# Patient Record
Sex: MALE | Race: ASIAN | NOT HISPANIC OR LATINO | URBAN - METROPOLITAN AREA
[De-identification: names, ages, dates, MRNs, and addresses within clinical notes are randomized per-mention and may not be internally consistent; named-entity substitution may affect disease eponyms.]

---

## 2022-03-07 ENCOUNTER — INPATIENT (INPATIENT)
Facility: HOSPITAL | Age: 62
LOS: 6 days | Discharge: HOME | DRG: 871 | End: 2022-03-14
Attending: INTERNAL MEDICINE | Admitting: INTERNAL MEDICINE
Payer: COMMERCIAL

## 2022-03-07 VITALS
HEIGHT: 65 IN | DIASTOLIC BLOOD PRESSURE: 88 MMHG | HEART RATE: 119 BPM | TEMPERATURE: 100 F | RESPIRATION RATE: 22 BRPM | SYSTOLIC BLOOD PRESSURE: 146 MMHG | OXYGEN SATURATION: 98 % | WEIGHT: 145.95 LBS

## 2022-03-07 DIAGNOSIS — C16.9 MALIGNANT NEOPLASM OF STOMACH, UNSPECIFIED: ICD-10-CM

## 2022-03-07 DIAGNOSIS — I10 ESSENTIAL (PRIMARY) HYPERTENSION: ICD-10-CM

## 2022-03-07 DIAGNOSIS — U07.1 COVID-19: ICD-10-CM

## 2022-03-07 DIAGNOSIS — R06.00 DYSPNEA, UNSPECIFIED: ICD-10-CM

## 2022-03-07 DIAGNOSIS — Z95.828 PRESENCE OF OTHER VASCULAR IMPLANTS AND GRAFTS: Chronic | ICD-10-CM

## 2022-03-07 DIAGNOSIS — K61.2 ANORECTAL ABSCESS: Chronic | ICD-10-CM

## 2022-03-07 DIAGNOSIS — D64.9 ANEMIA, UNSPECIFIED: ICD-10-CM

## 2022-03-07 DIAGNOSIS — Z29.9 ENCOUNTER FOR PROPHYLACTIC MEASURES, UNSPECIFIED: ICD-10-CM

## 2022-03-07 LAB
ALBUMIN SERPL ELPH-MCNC: 3.6 G/DL — SIGNIFICANT CHANGE UP (ref 3.3–5)
ALP SERPL-CCNC: 86 U/L — SIGNIFICANT CHANGE UP (ref 40–120)
ALT FLD-CCNC: 20 U/L — SIGNIFICANT CHANGE UP (ref 10–45)
ANION GAP SERPL CALC-SCNC: 12 MMOL/L — SIGNIFICANT CHANGE UP (ref 5–17)
ANISOCYTOSIS BLD QL: SLIGHT — SIGNIFICANT CHANGE UP
APPEARANCE UR: CLEAR — SIGNIFICANT CHANGE UP
AST SERPL-CCNC: 29 U/L — SIGNIFICANT CHANGE UP (ref 10–40)
BACTERIA # UR AUTO: NEGATIVE — SIGNIFICANT CHANGE UP
BASE EXCESS BLDV CALC-SCNC: 2.4 MMOL/L — HIGH (ref -2–2)
BASOPHILS # BLD AUTO: 0 K/UL — SIGNIFICANT CHANGE UP (ref 0–0.2)
BASOPHILS NFR BLD AUTO: 0 % — SIGNIFICANT CHANGE UP (ref 0–2)
BILIRUB SERPL-MCNC: 0.6 MG/DL — SIGNIFICANT CHANGE UP (ref 0.2–1.2)
BILIRUB UR-MCNC: NEGATIVE — SIGNIFICANT CHANGE UP
BLD GP AB SCN SERPL QL: NEGATIVE — SIGNIFICANT CHANGE UP
BLOOD GAS VENOUS - CREATININE: SIGNIFICANT CHANGE UP MG/DL (ref 0.5–1.3)
BUN SERPL-MCNC: 10 MG/DL — SIGNIFICANT CHANGE UP (ref 7–23)
CA-I SERPL-SCNC: 1.14 MMOL/L — LOW (ref 1.15–1.33)
CALCIUM SERPL-MCNC: 8.6 MG/DL — SIGNIFICANT CHANGE UP (ref 8.4–10.5)
CHLORIDE BLDV-SCNC: 100 MMOL/L — SIGNIFICANT CHANGE UP (ref 96–108)
CHLORIDE SERPL-SCNC: 97 MMOL/L — SIGNIFICANT CHANGE UP (ref 96–108)
CO2 BLDV-SCNC: 29 MMOL/L — HIGH (ref 22–26)
CO2 SERPL-SCNC: 26 MMOL/L — SIGNIFICANT CHANGE UP (ref 22–31)
COLOR SPEC: COLORLESS — SIGNIFICANT CHANGE UP
CREAT SERPL-MCNC: 0.8 MG/DL — SIGNIFICANT CHANGE UP (ref 0.5–1.3)
DACRYOCYTES BLD QL SMEAR: SLIGHT — SIGNIFICANT CHANGE UP
DIFF PNL FLD: NEGATIVE — SIGNIFICANT CHANGE UP
EGFR: 101 ML/MIN/1.73M2 — SIGNIFICANT CHANGE UP
EOSINOPHIL # BLD AUTO: 0 K/UL — SIGNIFICANT CHANGE UP (ref 0–0.5)
EOSINOPHIL NFR BLD AUTO: 0 % — SIGNIFICANT CHANGE UP (ref 0–6)
EPI CELLS # UR: 0 /HPF — SIGNIFICANT CHANGE UP
GAS PNL BLDV: 132 MMOL/L — LOW (ref 136–145)
GAS PNL BLDV: SIGNIFICANT CHANGE UP
GAS PNL BLDV: SIGNIFICANT CHANGE UP
GLUCOSE BLDV-MCNC: 97 MG/DL — SIGNIFICANT CHANGE UP (ref 70–99)
GLUCOSE SERPL-MCNC: 103 MG/DL — HIGH (ref 70–99)
GLUCOSE UR QL: NEGATIVE — SIGNIFICANT CHANGE UP
HCO3 BLDV-SCNC: 28 MMOL/L — SIGNIFICANT CHANGE UP (ref 22–29)
HCT VFR BLD CALC: 27.1 % — LOW (ref 39–50)
HCT VFR BLDA CALC: 27 % — LOW (ref 39–51)
HGB BLD CALC-MCNC: 9.1 G/DL — LOW (ref 12.6–17.4)
HGB BLD-MCNC: 8.5 G/DL — LOW (ref 13–17)
HYPOCHROMIA BLD QL: SLIGHT — SIGNIFICANT CHANGE UP
KETONES UR-MCNC: NEGATIVE — SIGNIFICANT CHANGE UP
LACTATE BLDV-MCNC: 1.5 MMOL/L — SIGNIFICANT CHANGE UP (ref 0.7–2)
LEUKOCYTE ESTERASE UR-ACNC: NEGATIVE — SIGNIFICANT CHANGE UP
LYMPHOCYTES # BLD AUTO: 0.11 K/UL — LOW (ref 1–3.3)
LYMPHOCYTES # BLD AUTO: 1.8 % — LOW (ref 13–44)
MANUAL SMEAR VERIFICATION: SIGNIFICANT CHANGE UP
MCHC RBC-ENTMCNC: 25.3 PG — LOW (ref 27–34)
MCHC RBC-ENTMCNC: 31.4 GM/DL — LOW (ref 32–36)
MCV RBC AUTO: 80.7 FL — SIGNIFICANT CHANGE UP (ref 80–100)
MICROCYTES BLD QL: SLIGHT — SIGNIFICANT CHANGE UP
MONOCYTES # BLD AUTO: 0.22 K/UL — SIGNIFICANT CHANGE UP (ref 0–0.9)
MONOCYTES NFR BLD AUTO: 3.5 % — SIGNIFICANT CHANGE UP (ref 2–14)
NEUTROPHILS # BLD AUTO: 5.98 K/UL — SIGNIFICANT CHANGE UP (ref 1.8–7.4)
NEUTROPHILS NFR BLD AUTO: 94.7 % — HIGH (ref 43–77)
NITRITE UR-MCNC: NEGATIVE — SIGNIFICANT CHANGE UP
NT-PROBNP SERPL-SCNC: 48 PG/ML — SIGNIFICANT CHANGE UP (ref 0–300)
OB PNL STL: NEGATIVE — SIGNIFICANT CHANGE UP
OVALOCYTES BLD QL SMEAR: SLIGHT — SIGNIFICANT CHANGE UP
PCO2 BLDV: 46 MMHG — SIGNIFICANT CHANGE UP (ref 42–55)
PH BLDV: 7.39 — SIGNIFICANT CHANGE UP (ref 7.32–7.43)
PH UR: 6.5 — SIGNIFICANT CHANGE UP (ref 5–8)
PLAT MORPH BLD: NORMAL — SIGNIFICANT CHANGE UP
PLATELET # BLD AUTO: 330 K/UL — SIGNIFICANT CHANGE UP (ref 150–400)
PO2 BLDV: 28 MMHG — SIGNIFICANT CHANGE UP (ref 25–45)
POIKILOCYTOSIS BLD QL AUTO: SLIGHT — SIGNIFICANT CHANGE UP
POLYCHROMASIA BLD QL SMEAR: SIGNIFICANT CHANGE UP
POTASSIUM BLDV-SCNC: 4.5 MMOL/L — SIGNIFICANT CHANGE UP (ref 3.5–5.1)
POTASSIUM SERPL-MCNC: 4.1 MMOL/L — SIGNIFICANT CHANGE UP (ref 3.5–5.3)
POTASSIUM SERPL-SCNC: 4.1 MMOL/L — SIGNIFICANT CHANGE UP (ref 3.5–5.3)
PROT SERPL-MCNC: 6.6 G/DL — SIGNIFICANT CHANGE UP (ref 6–8.3)
PROT UR-MCNC: NEGATIVE — SIGNIFICANT CHANGE UP
RBC # BLD: 3.36 M/UL — LOW (ref 4.2–5.8)
RBC # FLD: 30.4 % — HIGH (ref 10.3–14.5)
RBC BLD AUTO: ABNORMAL
RBC CASTS # UR COMP ASSIST: 1 /HPF — SIGNIFICANT CHANGE UP (ref 0–4)
RH IG SCN BLD-IMP: POSITIVE — SIGNIFICANT CHANGE UP
SAO2 % BLDV: 27.4 % — LOW (ref 67–88)
SARS-COV-2 RNA SPEC QL NAA+PROBE: DETECTED
SCHISTOCYTES BLD QL AUTO: SLIGHT — SIGNIFICANT CHANGE UP
SODIUM SERPL-SCNC: 135 MMOL/L — SIGNIFICANT CHANGE UP (ref 135–145)
SP GR SPEC: 1.03 — HIGH (ref 1.01–1.02)
TROPONIN T, HIGH SENSITIVITY RESULT: 8 NG/L — SIGNIFICANT CHANGE UP (ref 0–51)
UROBILINOGEN FLD QL: NEGATIVE — SIGNIFICANT CHANGE UP
WBC # BLD: 6.31 K/UL — SIGNIFICANT CHANGE UP (ref 3.8–10.5)
WBC # FLD AUTO: 6.31 K/UL — SIGNIFICANT CHANGE UP (ref 3.8–10.5)
WBC UR QL: 0 /HPF — SIGNIFICANT CHANGE UP (ref 0–5)

## 2022-03-07 PROCEDURE — 99223 1ST HOSP IP/OBS HIGH 75: CPT

## 2022-03-07 PROCEDURE — 71045 X-RAY EXAM CHEST 1 VIEW: CPT | Mod: 26

## 2022-03-07 PROCEDURE — 99285 EMERGENCY DEPT VISIT HI MDM: CPT

## 2022-03-07 PROCEDURE — 71275 CT ANGIOGRAPHY CHEST: CPT | Mod: 26,MA

## 2022-03-07 RX ORDER — PANTOPRAZOLE SODIUM 20 MG/1
1 TABLET, DELAYED RELEASE ORAL
Qty: 0 | Refills: 0 | DISCHARGE

## 2022-03-07 RX ORDER — CHLORPROMAZINE HCL 10 MG
1 TABLET ORAL
Qty: 0 | Refills: 0 | DISCHARGE

## 2022-03-07 RX ORDER — ONDANSETRON 8 MG/1
4 TABLET, FILM COATED ORAL EVERY 8 HOURS
Refills: 0 | Status: DISCONTINUED | OUTPATIENT
Start: 2022-03-07 | End: 2022-03-14

## 2022-03-07 RX ORDER — CEFTRIAXONE 500 MG/1
1000 INJECTION, POWDER, FOR SOLUTION INTRAMUSCULAR; INTRAVENOUS ONCE
Refills: 0 | Status: COMPLETED | OUTPATIENT
Start: 2022-03-07 | End: 2022-03-07

## 2022-03-07 RX ORDER — SODIUM CHLORIDE 9 MG/ML
500 INJECTION INTRAMUSCULAR; INTRAVENOUS; SUBCUTANEOUS ONCE
Refills: 0 | Status: COMPLETED | OUTPATIENT
Start: 2022-03-07 | End: 2022-03-07

## 2022-03-07 RX ORDER — ENOXAPARIN SODIUM 100 MG/ML
40 INJECTION SUBCUTANEOUS EVERY 12 HOURS
Refills: 0 | Status: DISCONTINUED | OUTPATIENT
Start: 2022-03-07 | End: 2022-03-14

## 2022-03-07 RX ORDER — OXYCODONE HYDROCHLORIDE 5 MG/1
5 TABLET ORAL EVERY 4 HOURS
Refills: 0 | Status: DISCONTINUED | OUTPATIENT
Start: 2022-03-07 | End: 2022-03-12

## 2022-03-07 RX ORDER — ALBUTEROL 90 UG/1
2 AEROSOL, METERED ORAL EVERY 4 HOURS
Refills: 0 | Status: DISCONTINUED | OUTPATIENT
Start: 2022-03-07 | End: 2022-03-14

## 2022-03-07 RX ORDER — CLONAZEPAM 1 MG
1 TABLET ORAL
Qty: 0 | Refills: 0 | DISCHARGE

## 2022-03-07 RX ORDER — POLYETHYLENE GLYCOL 3350 17 G/17G
17 POWDER, FOR SOLUTION ORAL DAILY
Refills: 0 | Status: DISCONTINUED | OUTPATIENT
Start: 2022-03-07 | End: 2022-03-14

## 2022-03-07 RX ORDER — ACETAMINOPHEN 500 MG
975 TABLET ORAL ONCE
Refills: 0 | Status: COMPLETED | OUTPATIENT
Start: 2022-03-07 | End: 2022-03-07

## 2022-03-07 RX ORDER — AZITHROMYCIN 500 MG/1
500 TABLET, FILM COATED ORAL ONCE
Refills: 0 | Status: COMPLETED | OUTPATIENT
Start: 2022-03-07 | End: 2022-03-07

## 2022-03-07 RX ORDER — ACETAMINOPHEN 500 MG
650 TABLET ORAL EVERY 4 HOURS
Refills: 0 | Status: DISCONTINUED | OUTPATIENT
Start: 2022-03-07 | End: 2022-03-14

## 2022-03-07 RX ORDER — TELMISARTAN 20 MG/1
1 TABLET ORAL
Qty: 0 | Refills: 0 | DISCHARGE

## 2022-03-07 RX ORDER — PANTOPRAZOLE SODIUM 20 MG/1
80 TABLET, DELAYED RELEASE ORAL ONCE
Refills: 0 | Status: COMPLETED | OUTPATIENT
Start: 2022-03-07 | End: 2022-03-07

## 2022-03-07 RX ORDER — PANTOPRAZOLE SODIUM 20 MG/1
40 TABLET, DELAYED RELEASE ORAL
Refills: 0 | Status: DISCONTINUED | OUTPATIENT
Start: 2022-03-07 | End: 2022-03-14

## 2022-03-07 RX ORDER — LOSARTAN POTASSIUM 100 MG/1
100 TABLET, FILM COATED ORAL DAILY
Refills: 0 | Status: DISCONTINUED | OUTPATIENT
Start: 2022-03-07 | End: 2022-03-14

## 2022-03-07 RX ORDER — CLONAZEPAM 1 MG
0.5 TABLET ORAL AT BEDTIME
Refills: 0 | Status: DISCONTINUED | OUTPATIENT
Start: 2022-03-07 | End: 2022-03-14

## 2022-03-07 RX ORDER — SENNA PLUS 8.6 MG/1
2 TABLET ORAL AT BEDTIME
Refills: 0 | Status: DISCONTINUED | OUTPATIENT
Start: 2022-03-07 | End: 2022-03-14

## 2022-03-07 RX ADMIN — Medication 200 MILLIGRAM(S): at 17:57

## 2022-03-07 RX ADMIN — Medication 200 MILLIGRAM(S): at 23:44

## 2022-03-07 RX ADMIN — CEFTRIAXONE 100 MILLIGRAM(S): 500 INJECTION, POWDER, FOR SOLUTION INTRAMUSCULAR; INTRAVENOUS at 15:40

## 2022-03-07 RX ADMIN — PANTOPRAZOLE SODIUM 80 MILLIGRAM(S): 20 TABLET, DELAYED RELEASE ORAL at 15:40

## 2022-03-07 RX ADMIN — ALBUTEROL 2 PUFF(S): 90 AEROSOL, METERED ORAL at 23:45

## 2022-03-07 RX ADMIN — Medication 975 MILLIGRAM(S): at 15:50

## 2022-03-07 RX ADMIN — Medication 975 MILLIGRAM(S): at 16:00

## 2022-03-07 RX ADMIN — AZITHROMYCIN 255 MILLIGRAM(S): 500 TABLET, FILM COATED ORAL at 17:28

## 2022-03-07 RX ADMIN — SODIUM CHLORIDE 500 MILLILITER(S): 9 INJECTION INTRAMUSCULAR; INTRAVENOUS; SUBCUTANEOUS at 15:39

## 2022-03-07 NOTE — ED PROVIDER NOTE - OBJECTIVE STATEMENT
62y M w/ PMHx with stage IV gastric adenocarcinoma (pMMR, HER-2 0+, CPS <1) with mets to the peritoneum presenting to the ED with SOB, productive cough ,intermittent fever and melanotic school x1 week. Patient states he noted dark stool Monday that has since resolved. States his shortness of breath has progressed and now exists at rest. Cough is productive with white sputum and he states he has had intermittent fevers as high as 102F. Last chemo was Friday (FOLFOX/Nivolumab), states he is current chemo course is getting changed and he is planned to also start radiation therapy. Patient had recent CT chest/abdomen/pelvis on 3/2/2022 which showed significant progression of diease and lymphangitic spread. Denies known sick contacts, states he has never had hx of DVT/PE. Also complaining of chest pain with coughing and speaking. States he has chronic abdominal pain, no acute worsening of his symptoms. Denies dizziness, nausea, vomiting, dysuria, hematuria, rash.     [place] [facility] [place]

## 2022-03-07 NOTE — CONSULT NOTE ADULT - ASSESSMENT
Patient is a 62 year old male [place] [facility] [place] with stage IV gastric adenocarcinoma (pMMR, HER-2 0+, CPS <1) with mets to the peritoneum. First cycle of chemotherapy was FOLFOX/Nivolumab - patient initially had response but was admitted to [facility] for SOB, cough and found to have progression of disease. CT chest/abdomen/pelvis 3/2/2022 showed significant progression of diease and lymphangitic spread. Patient is also reporting dark bowel movements, strongly concerning for melena.    Patient received one dose of paclitaxel 80 mg/m2 on 3/4/2022. RT consult was placed but he has not yet seen [place]. Forrest [place] [facility] [place] now at [facility] with ongoing SOB, cough and dark stools.    Stage IV Gastric Cancer  --[place] [facility] [place]  --S/P second line of chemotherapy - Paclitaxel 80 mg/m2 - started on 3/4/2022  --Patient was also being evaluated for palliative RT  --Please get RT consult as requested by Dr. Rojas    Anemia  --Most likely multifactorial  --Anemia of chronic disease/chemotherapy  --Concern for GIB, melena  --Please draw iron, TIBC, Ferritin  --If patient found to be iron deficient, would consider giving IV iron replacement  --Please also get GI consult    We will continue to follow patient and coordinate [place] [facility]    Alejandro Ureña PA-C  Hematology/Oncology  [facility]   [phone] (office)  [phone] (alt office)  Evenings and weekends please call MD on call or office       Patient is a 62 year old male under care at [facility] Dr. Brad Rojas with stage IV gastric adenocarcinoma (pMMR, HER-2 0+, CPS <1) with mets to the peritoneum. First cycle of chemotherapy was FOLFOX/Nivolumab - patient initially had response but was admitted to [facility] for SOB, cough and found to have progression of disease. CT chest/abdomen/pelvis 3/2/2022 showed significant progression of diease and lymphangitic spread. Patient is also reporting dark bowel movements, strongly concerning for melena.    Patient received one dose of paclitaxel 80 mg/m2 on 3/4/2022. RT consult was placed but he has not yet seen Dr. Forrest of [facility]. He is now at [facility] with ongoing SOB, cough and dark stools.    Stage IV Gastric Cancer  --Under Care by Dr. Brad Rojas at [facility]  --S/P second line of chemotherapy - Paclitaxel 80 mg/m2 - started on 3/4/2022  --Patient was also being evaluated for palliative RT  --Please get RT consult as requested by Dr. Rojas    Anemia  --Most likely multifactorial  --Anemia of chronic disease/chemotherapy  --Concern for GIB, melena  --Please draw iron, TIBC, Ferritin  --If patient found to be iron deficient, would consider giving IV iron replacement  --Please also get GI consult    If admitted to medicine, please admit to Dr. Ari Villanueva who is aware of patient.    We will continue to follow patient and coordinate with [facility].    Alejandro Ureña PA-C  Hematology/Oncology  [facility]   [phone] (office)  [phone] (alt office)  Evenings and weekends please call MD on call or office       Patient is a 62 year old male under care at [facility] [place]. Brad Rojas with stage IV gastric adenocarcinoma (pMMR, HER-2 0+, CPS <1) with mets to the peritoneum. First cycle of chemotherapy was FOLFOX/Nivolumab - patient initially had response but was admitted to [facility] for SOB, cough and found to have progression of disease. CT chest/abdomen/pelvis 3/2/2022 showed significant progression of diease and lymphangitic spread. Patient is also reporting dark bowel movements, strongly concerning for melena.    Patient received one dose of paclitaxel 80 mg/m2 on 3/4/2022. RT consult was placed but he has not yet seen Dr. Forrest of [facility]. He is now at [facility] with ongoing SOB, cough and dark stools.    Stage IV Gastric Cancer  --Under Care by Dr. Brad Rojas at [facility]  --S/P second line of chemotherapy - Paclitaxel 80 mg/m2 - started on 3/4/2022  --Patient was also being evaluated for palliative RT  --Please get RT consult as requested by Dr. Rojas    Anemia  --Most likely multifactorial  --Anemia of chronic disease/chemotherapy  --Concern for GIB, melena  --Please draw iron, TIBC, Ferritin, stool for occult blood  --If patient found to be iron deficient, would consider giving IV iron replacement  --Please also get GI consult    If admitted to medicine, please admit to Dr. Ari Villanueva who is aware of patient.    We will continue to follow patient and coordinate with [facility].    Alejandro Ureña PA-C  Hematology/Oncology  [facility]   [phone] (office)  [phone] (alt office)  Evenings and weekends please call MD on call or office       Patient is a 62 year old male under care at [facility] [place] Rojas with stage IV gastric adenocarcinoma (pMMR, HER-2 0+, CPS <1) with mets to the peritoneum. First cycle of chemotherapy was FOLFOX/Nivolumab - patient initially had response but was admitted to [facility] for SOB, cough and found to have progression of disease. CT chest/abdomen/pelvis 3/2/2022 showed significant progression of diease and lymphangitic spread. Patient is also reporting dark bowel movements, strongly concerning for melena.    Patient received one dose of paclitaxel 80 mg/m2 on 3/4/2022. RT consult was placed but he has not yet seen Dr. Forrest of [facility]. He is now at [facility] with ongoing SOB, cough and dark stools.    Stage IV Gastric Cancer  --Under Care by Dr. Brad Rojas at [facility]  --S/P second line of chemotherapy - Paclitaxel 80 mg/m2 - started on 3/4/2022  --Patient was also being evaluated for palliative RT  --Please get RT consult for palliative RT as requested by Dr. Rojas    Anemia  --Most likely multifactorial  --Anemia of chronic disease/chemotherapy  --Concern for GIB, melena  --Please draw iron, TIBC, Ferritin, stool for occult blood  --If patient found to be iron deficient, would consider giving IV iron replacement  --Please also get GI consult    Shortness of Breath  --Lymphangitic spread on CT  --Please get Pulmonary consult    If admitted to medicine, please admit to Dr. Ari Villanueva who is aware of patient.    We will continue to follow patient and coordinate with [facility].    Alejandro Ureña PA-C  Hematology/Oncology  [facility]   [phone] (office)  [phone] (alt office)  Evenings and weekends please call MD on call or office       Patient is a 62 year old [place] [facility] [place] with stage IV gastric adenocarcinoma (pMMR, HER-2 0+, CPS <1) with mets to the peritoneum. First cycle of chemotherapy was FOLFOX/Nivolumab - patient initially had response but was admitted to [facility] for SOB, cough and found to have progression of disease. CT chest/abdomen/pelvis 3/2/2022 showed significant progression of diease and lymphangitic spread. Patient is also reporting dark bowel movements, strongly concerning for melena.    Patient received one dose of paclitaxel 80 mg/m2 on 3/4/2022. RT consult was placed but he has not yet seen [place] [facility] is now at [facility] with ongoing SOB, cough and dark stools.    Stage IV Gastric Cancer  --[place] [facility]  --S/P second line of chemotherapy - Paclitaxel 80 mg/m2 - started on 3/4/2022  --Patient was also being evaluated for palliative RT  --Please get RT consult for palliative RT as requested by Dr. Rojas (pain and bleeding)    Anemia  --Most likely multifactorial  --Anemia of chronic disease/chemotherapy  --Concern for GIB, melena  --Please draw iron, TIBC, Ferritin, stool for occult blood  --If patient found to be iron deficient, would consider giving IV iron replacement  --Please also get GI consult    Shortness of Breath, Fever  --Lymphangitic spread on CT  --Need to rule out infectious process as well given fever to 102  --CXR done -results pending  --Please get Pulmonary consult    If admitted to medicine, please admit to Dr. Ari Villanueva who is aware of patient.    We will continue to follow patient and coordinate with [facility]    Alejandro Ureña PA-C  Hematology/Oncology  [facility]   [phone] (office)  [phone] (alt office)  Evenings and weekends please call MD on call or office

## 2022-03-07 NOTE — H&P ADULT - PROBLEM SELECTOR PLAN 5
dvt ppx: Lovenox 40mg q12h pending d-dimer  gi ppx: home pantoprazole  ambulate: with assistance  Diet: regular

## 2022-03-07 NOTE — H&P ADULT - NSHPPHYSICALEXAM_GEN_ALL_CORE
Vital Signs Last 24 Hrs  T(C): 36.9 (07 Mar 2022 19:29), Max: 38.4 (07 Mar 2022 16:34)  T(F): 98.4 (07 Mar 2022 19:29), Max: 101.2 (07 Mar 2022 16:34)  HR: 102 (07 Mar 2022 19:29) (102 - 119)  BP: 117/90 (07 Mar 2022 19:29) (117/90 - 146/88)  BP(mean): --  RR: 22 (07 Mar 2022 19:29) (22 - 24)  SpO2: 98% (07 Mar 2022 19:29) (98% - 99%)

## 2022-03-07 NOTE — H&P ADULT - PROBLEM SELECTOR PLAN 1
-currently not hypoxic on room air  -c/w tessalon 200mg q8h PRN  -continuous pulse ox monitoring  -Albuterol PRN  -obtain D-dimer (although likely to be elevated in setting of malignancy)  -tylenol PRN fever   -fever likely secondary to COVID but will obtain blood/urine cultures-no over consolidations seen on imaging   -monitor off antibiotics for now

## 2022-03-07 NOTE — H&P ADULT - HISTORY OF PRESENT ILLNESS
61yo M w/ PMHx of Stage IV gastric adenocarcinoma w/ mets to peritoneum, HTN, Hx of PE (s/p Lovenox & IVC filter) presents for cough/SOB, patient was initially Dx w/ gastric adenocarcinoma 11/2021 and received FOLFOX/Nivolumab, initially with response, however patient reports suffering cough/SOB with clear sputum that has progressively worsened over the past week, he went to [facility] and imaging there showed worsening disease progression and patient was prescribed Augment and discharged home, today he reports continued symptoms, he endorses a fever last night of 100.2 and had 1 episode of dark stool that resolved, he previously was on lovenox for a PE, had IVC filter placed and was previously taken off therapeutic lovenox due to anemia, he follows with Dr. Brad Rojas at [facility] [place], in the ED, he was hemodynamically stable, afebrile to 102, mildly tachypneic but saturating well on room air, labs were significant for borderline low microcytic anemia (unknown baseline) and COVID positive, CTA chest negative for PE, oncology was consulted in the ED, patient admitted to general medicine for further management.

## 2022-03-07 NOTE — ED PROVIDER NOTE - NS ED ROS FT
CONSTITUTIONAL: + fevers, + chills, no lightheadedness, no dizziness  EYES: no visual changes, no eye pain  EARS: no ear drainage, no ear pain, no change in hearing  NOSE: no nasal congestion  MOUTH/THROAT: no sore throat  CV: +chest pain, no palpitations  RESP: + SOB, + cough  GI: No n/v/d, +chronic abd pain, +dark stool   : no dysuria, no hematuria, no flank pain  MSK: no back pain, no extremity pain  SKIN: no rashes  NEURO: no headache, no focal weakness, no decreased sensation/paresthesias   PSYCHIATRIC: no known mental health issues

## 2022-03-07 NOTE — ED PROVIDER NOTE - CLINICAL SUMMARY MEDICAL DECISION MAKING FREE TEXT BOX
roscoe 62 m w metastatic gastric ca on chemo with worsening sob and cough also co dark melanotic stool improving over last few days-  pt had ct last week with progression of dz chest and abd -- on eval tachycardic - normotensive sat98 on ra - coarse bs bl - with fever 102 lat pm - productive cough clear sputum -- will cta w ho ca - r/o pe -- iv fluids, will need transfusion if < 7 - empirically tx for pna- will need admission -

## 2022-03-07 NOTE — ED ADULT NURSE NOTE - OBJECTIVE STATEMENT
62y M w/ PMHx with stage IV gastric adenocarcinoma  with mets to the peritoneum presenting to the ED with SOB, productive cough ,intermittent fever and melanotic stool x1 week. Patient states he noted dark stool Monday that has since resolved. States his shortness of breath has progressed and now exists at rest. Cough is productive with white sputum and he states he has had intermittent fevers as high as 102F. Last chemo was Friday, states he is current chemo course is getting changed and he is planned to also start radiation therapy. Patient had recent CT chest/abdomen/pelvis on 3/2/2022 which showed significant progression of diease and lymphangitic spread. Denies known sick contacts, states he has never had hx of DVT/PE. Also complaining of chest pain with coughing and speaking. States he has chronic abdominal pain, no acute worsening of his symptoms. Denies dizziness, nausea, vomiting, dysuria, hematuria, rash

## 2022-03-07 NOTE — H&P ADULT - PROBLEM SELECTOR PLAN 2
-follows with [facility] following at [facility]  -s/p second line chemotherapy Paclitaxel - started on 3/4/2022  -c/w home pain regimen oxycodone IR 5mg q4h PRN  -bowel regimen   -radiation oncology consult in AM  -clonazepam 0.5mg qhs   -iSTOP reviewed Reference #[id number]  -Zofran 4mg IV PRN for N/V (monitor QTc)  -adrenal nodule noted on imaging

## 2022-03-07 NOTE — CONSULT NOTE ADULT - SUBJECTIVE AND OBJECTIVE BOX
Reason for consult: Stage IV Gastric Cancer    HPI:  Patient is a 62 year old male under care at [facility] Dr. Duong Rojas with stage IV gastric adenocarcinoma (pMMR, HER-2 0+, CPS <1) with mets to the peritoneum. First cycle of chemotherapy was FOLFOX/Nivolumab - patient initially had response but was admitted to [facility] for SOB, cough and found to have progression of disease. CT chest/abdomen/pelvis 3/2/2022 showed significant progression of diease and lymphangitic spread. Patient is also reporting dark bowel movements, strongly concerning for melena.    Patient received one dose of paclitaxel 80 mg/m2 on 3/4/2022. RT consult was placed but he has not yet seen Dr. Forrest of [facility]. He is now at [facility] with ongoing SOB, cough and dark stools.      PAST MEDICAL & SURGICAL HISTORY:      FAMILY HISTORY:      Alcohol Denied  Smoking: Nonsmoker  Drug Use: Denied  Marital Status:         Allergies    No Known Allergies    Intolerances        MEDICATIONS  (STANDING):    MEDICATIONS  (PRN):      ROS  No fever, sweats, chills  No epistaxis, HA, sore throat  SOB, cough  Dark stool concerning for melena  No edema  No rash  No anxiety  No back pain, joint pain  No bleeding, bruising  No dysuria, hematuria    T(C): 37.7 (22 @ 14:01), Max: 37.7 (22 @ 14:01)  HR: 119 (22 @ 14:01) (119 - 119)  BP: 146/88 (22 @ 14:01) (146/88 - 146/88)  RR: 22 (22 @ 14:01) (22 - 22)  SpO2: 98% (22 @ 14:01) (98% - 98%)  Wt(kg): --    PE  NAD  Awake, alert  Anicteric, MMM  RRR  CTAB  Abd soft, NT, ND  No c/c/e  No rash grossly  FROM    [facility]   DEPARTMENT OF RADIOLOGY   [facility] [address] [address] [phone]     FOREIGN FILMS   Report of Consultation   Name: CECY LEE Acc No: [id number]   MRN: [id number] Date of Service: 2022   : 1960 Sex: M Pt Loc: [facility]   Ordered by: DUONG ROJAS MD Date of Report: 2022 05:18 PM   Procedure: FOREIGN CT CH/AB/PELVIS Account: [id number]   PRID #: [id number]     FINAL REPORT   3/2/2022 submitted CT chest, abdomen, pelvis performed 3/1/2022   CLINICAL STATEMENT: Patient with history of gastric cancer referred for   diagnostic workup of coughing and fever.   TECHNIQUE: CT with IV contrast.   RADIATION DOSE (DLP): 785 mGy-cm   COMPARISON: CT chest abdomen pelvis 2022.   CORRELATION: None.   FINDINGS:   LUNGS/AIRWAYS: Right more than left   probable lymphangitic malignancy spread extending out from from bilateral   hilar masses.   PLEURA/PERICARDIUM: New small bilateral pleural effusions. Increased   moderate pericardial effusion.   MEDIASTINUM/THORACIC NODES: Increased cervicothoracic and   bilateral hilar adenopathy, for example:   * Diffusely infiltrating bilateral hilar masses, right more than left,   with mediastinal extension and multifocal bronchial and vascular narrowing   * Right upper paratracheal, 2.7 x 1.9 cm, prior 1.5 x 1.1 cm.   * Prevascular, 2.0 x 1.6 cm, prior 1.6 x 0.9 cm   * Left supraclavicular, 1.8 x 1.4 cm, prior 0.8 x 0.6 cm.     HEPATOBILIARY: Unchanged punctate hypodense focus.   SPLEEN: Unremarkable.   PANCREAS: Unremarkable.   ADRENAL GLANDS: Left para-aortic adenopathy inseparable from left   adrenal, probably direct tumor extension. Right adrenal unremarkable.   KIDNEYS: Bilateral cortical cysts and too small   to characterize hypoattenuating lesions.   ABDOMINOPELVIC   NODES: Increased abdominopelvic adenopathy,   for example:   * Gastrohepatic ligament, 3.2 x 2.4 cm, prior 2.2 x 1.6 cm   * Left para-aortic, 4.6 x 3.4 cm, prior 3.3 x 2.3 cm.   * Left common iliac, 2.8 x 1.5 cm, prior 1.8 x 1.0 cm     PELVIC ORGANS: Enlarged prostate. Nonspecific mild presacral   fat stranding.   PERITONEUM/   MESENTERY/BOWEL: Probably increased distal gastric wall thickening; no   evidence of obstruction. Increased multiple perigastric nodules up to 2.1   x 1.9 cm, prior 1.7 x 1.2 cm. Increased diffuse mesenteric stranding, most   prominently in the omentum and left paracolic gutter.     BONES/SOFT TISSUES: No suspicious osseous lesion. Unchanged right chest   wall lipoma.   OTHER: Right chest wall port, catheter tip   in right atrium. Infrarenal IVC filter. Coronary artery calcifications.   IMPRESSION:   1. Since 2022, increased extent of malignancy involving   stomach, widespread lymph nodes, probably peritoneum, lungs, and left   adrenal.   2. Diffusely infiltrating metastases at bilateral pulmonary cherry, with   multifocal bronchovascular compression and lymphangitic spread into lungs.   3. Increased moderate pericardial effusion; new small bilateral pleural   effusions.     FINAL REPORT   Dictated By:   Staff Radiologist: AUSTIN HERBERT MD   I attest that the above IMPRESSION is based upon my personal examination of   the entire imaging study and that I have reviewed and approved this report.   The following terms are used in [facility] Radiology reports   (except those of breast imaging studies)   to convey the radiologist's level of certainty for a given interpretation.   Consistent with > 90%   Suspicious for/Probable/Probably approx 75%   Possible/Possibly approx 50%   Less likely approx 25%   Unlikely < 10%     Electronically Signed By: AUSTIN HERBERT MD (Mar 2 2022 5:18PM)          Reason for consult: Stage IV Gastric Cancer    HPI:  Patient is a 62 year old male under care at [facility] [place]. Duong Rojas with stage IV gastric adenocarcinoma (pMMR, HER-2 0+, CPS <1) with mets to the peritoneum. First cycle of chemotherapy was FOLFOX/Nivolumab - patient initially had response but was admitted to [facility] for SOB, cough and found to have progression of disease. CT chest/abdomen/pelvis 3/2/2022 showed significant progression of diease and lymphangitic spread. Patient is also reporting dark bowel movements, strongly concerning for melena.    Patient received one dose of paclitaxel 80 mg/m2 on 3/4/2022. RT consult was placed but he has not yet seen Dr. Forrest of [facility]. He is now at [facility] with ongoing SOB, cough and dark stools.      PAST MEDICAL & SURGICAL HISTORY:      FAMILY HISTORY:      Alcohol Denied  Smoking: Nonsmoker  Drug Use: Denied  Marital Status:         Allergies    No Known Allergies    Intolerances        MEDICATIONS  (STANDING):    MEDICATIONS  (PRN):      ROS  Fever to 102 last night  No epistaxis, HA, sore throat  SOB, cough  Dark stool concerning for melena  Upper abdominal/lower chest pains  No edema  No rash  No anxiety  No back pain, joint pain  No bleeding, bruising  No dysuria, hematuria    T(C): 37.7 (22 @ 14:01), Max: 37.7 (22 @ 14:01)  HR: 119 (22 @ 14:01) (119 - 119)  BP: 146/88 (22 @ 14:01) (146/88 - 146/88)  RR: 22 (22 @ 14:01) (22 - 22)  SpO2: 98% (22 @ 14:01) (98% - 98%)  Wt(kg): --    PE  NAD  Awake, alert  Anicteric, MMM  RRR  CTAB  Abd guarding - RUQ tenderness  No c/c/e  No rash grossly  FROM    [facility]   DEPARTMENT OF RADIOLOGY   [facility] [address]   [address] [place] [address] [phone]     FOREIGN FILMS   Report of Consultation   Name: CECY LEE ALONSO Acc No: [id number]   MRN: [id number] Date of Service: 2022   : 1960 Sex: M Pt Loc: [facility]   Ordered by: DUONG ROJAS MD Date of Report: 2022 05:18 PM   Procedure: FOREIGN CT CH/AB/PELVIS Account: [id number]   PRID #: [id number]     FINAL REPORT   3/2/2022 submitted CT chest, abdomen, pelvis performed 3/1/2022   CLINICAL STATEMENT: Patient with history of gastric cancer referred for   diagnostic workup of coughing and fever.   TECHNIQUE: CT with IV contrast.   RADIATION DOSE (DLP): 785 mGy-cm   COMPARISON: CT chest abdomen pelvis 2022.   CORRELATION: None.   FINDINGS:   LUNGS/AIRWAYS: Right more than left   probable lymphangitic malignancy spread extending out from from bilateral   hilar masses.   PLEURA/PERICARDIUM: New small bilateral pleural effusions. Increased   moderate pericardial effusion.   MEDIASTINUM/THORACIC NODES: Increased cervicothoracic and   bilateral hilar adenopathy, for example:   * Diffusely infiltrating bilateral hilar masses, right more than left,   with mediastinal extension and multifocal bronchial and vascular narrowing   * Right upper paratracheal, 2.7 x 1.9 cm, prior 1.5 x 1.1 cm.   * Prevascular, 2.0 x 1.6 cm, prior 1.6 x 0.9 cm   * Left supraclavicular, 1.8 x 1.4 cm, prior 0.8 x 0.6 cm.     HEPATOBILIARY: Unchanged punctate hypodense focus.   SPLEEN: Unremarkable.   PANCREAS: Unremarkable.   ADRENAL GLANDS: Left para-aortic adenopathy inseparable from left   adrenal, probably direct tumor extension. Right adrenal unremarkable.   KIDNEYS: Bilateral cortical cysts and too small   to characterize hypoattenuating lesions.   ABDOMINOPELVIC   NODES: Increased abdominopelvic adenopathy,   for example:   * Gastrohepatic ligament, 3.2 x 2.4 cm, prior 2.2 x 1.6 cm   * Left para-aortic, 4.6 x 3.4 cm, prior 3.3 x 2.3 cm.   * Left common iliac, 2.8 x 1.5 cm, prior 1.8 x 1.0 cm     PELVIC ORGANS: Enlarged prostate. Nonspecific mild presacral   fat stranding.   PERITONEUM/   MESENTERY/BOWEL: Probably increased distal gastric wall thickening; no   evidence of obstruction. Increased multiple perigastric nodules up to 2.1   x 1.9 cm, prior 1.7 x 1.2 cm. Increased diffuse mesenteric stranding, most   prominently in the omentum and left paracolic gutter.     BONES/SOFT TISSUES: No suspicious osseous lesion. Unchanged right chest   wall lipoma.   OTHER: Right chest wall port, catheter tip   in right atrium. Infrarenal IVC filter. Coronary artery calcifications.   IMPRESSION:   1. Since 2022, increased extent of malignancy involving   stomach, widespread lymph nodes, probably peritoneum, lungs, and left   adrenal.   2. Diffusely infiltrating metastases at bilateral pulmonary cherry, with   multifocal bronchovascular compression and lymphangitic spread into lungs.   3. Increased moderate pericardial effusion; new small bilateral pleural   effusions.     FINAL REPORT   Dictated By:   Staff Radiologist: AUSTIN HERBERT MD   I attest that the above IMPRESSION is based upon my personal examination of   the entire imaging study and that I have reviewed and approved this report.   The following terms are used in [facility] Rad[facility] reports   (except those of breast imaging studies)   to convey the radiologist's level of certainty for a given interpretation.   Consistent with > 90%   Suspicious for/Probable/Probably approx 75%   Possible/Possibly approx 50%   Less likely approx 25%   Unlikely < 10%     Electronically Signed By: AUSTIN HERBERT MD (Mar 2 2022 5:18PM)

## 2022-03-07 NOTE — ED CLERICAL - NS ED CLERK NOTE PRE-ARRIVAL INFORMATION; ADDITIONAL PRE-ARRIVAL INFORMATION
CC/Reason For referral: HX gastric Ca SOB cough, GIB  Preferred Consultant(if applicable): any  Who admits for you (if needed): Rich  Do you have documents you would like to fax over? no  Would you still like to speak to an ED attending?  yes at [phone]

## 2022-03-07 NOTE — H&P ADULT - NSHPLABSRESULTS_GEN_ALL_CORE
Personally reviewed labs, EKG and imaging     CXR: perihilar opacities, chest port visible, very small left sided pleural effusion

## 2022-03-07 NOTE — ED PROVIDER NOTE - PHYSICAL EXAMINATION
Physical Exam:  Gen: Awake, alert, dyspneic w/ intermittent coughing   HEENT: normal conjunctiva, tongue midline, oral mucosa moist  Neck: +tender R cervical lymph node   Lung: +mild course sounds to RLL, +tachypnea, speaking in short sentences broken up by coughing   CV: Rapid rate, RR, no murmurs, rubs or gallops  Abd: soft, NT, ND, no guarding, no rigidity, no rebound tenderness, no CVA tenderness   MSK: no visible deformities, ROM normal in UE/LE, no back pain  Neuro: No focal sensory or motor deficits  Skin: Warm, well perfused, no rash, +small ecchymosis to abdomen, no leg swelling  Psych: normal affect, calm  Iris Linn D.O. -Resident

## 2022-03-07 NOTE — H&P ADULT - OPHTHALMOLOGIC
Called patient's wife (see NEGRITO on file) regarding recent results in message below. Discussed diet in detail including decreasing  high carb/ high sugar intake and increasing physical activity. She verbalized understanding and agreeable.  A1c order in place for July with lab appointment on 7/ 22/21.    details…

## 2022-03-07 NOTE — CHART NOTE - NSCHARTNOTEFT_GEN_A_CORE
Patient with advanced gastric cancer under care by Dr. Brad Rojas at [facility] presenting to [facility] with SOB, cough. Was discharged from [facility] for same 3/3/2022 and received cycle 1 of weekly Paclitaxel on 3/4 at [facility]. Prior therapy was Fluorouracil/Leucovorin/Nivolumab/Oxaplatin (last dose 2/9/2022). Per Dr. Rojas, patient also having dark stools with concern for melena.     Records being sent from [facility]. Full consult to follow.    If admitted to medicine, please admit to Dr. Ari Villanueva who is aware of patient.    Thank you,    Alejandro Ureña PA-C  Hematology/Oncology  [facility]   [phone] (office)  [phone] (alt office)  Evenings and weekends please call MD on call or office

## 2022-03-07 NOTE — ED PROVIDER NOTE - PROGRESS NOTE DETAILS
Kaveh CORRALES (PGY-3): Patient with rectal temp 101.3F, BCx added, abx ordered, will give Tylenol, penidng labs, CT, will require admission.

## 2022-03-08 DIAGNOSIS — G89.3 NEOPLASM RELATED PAIN (ACUTE) (CHRONIC): ICD-10-CM

## 2022-03-08 DIAGNOSIS — R05.9 COUGH, UNSPECIFIED: ICD-10-CM

## 2022-03-08 DIAGNOSIS — C78.00 SECONDARY MALIGNANT NEOPLASM OF UNSPECIFIED LUNG: ICD-10-CM

## 2022-03-08 DIAGNOSIS — Z51.5 ENCOUNTER FOR PALLIATIVE CARE: ICD-10-CM

## 2022-03-08 LAB
ALBUMIN SERPL ELPH-MCNC: 3.2 G/DL — LOW (ref 3.3–5)
ALP SERPL-CCNC: 79 U/L — SIGNIFICANT CHANGE UP (ref 40–120)
ALT FLD-CCNC: 20 U/L — SIGNIFICANT CHANGE UP (ref 10–45)
ANION GAP SERPL CALC-SCNC: 12 MMOL/L — SIGNIFICANT CHANGE UP (ref 5–17)
AST SERPL-CCNC: 25 U/L — SIGNIFICANT CHANGE UP (ref 10–40)
BASOPHILS # BLD AUTO: 0.02 K/UL — SIGNIFICANT CHANGE UP (ref 0–0.2)
BASOPHILS NFR BLD AUTO: 0.3 % — SIGNIFICANT CHANGE UP (ref 0–2)
BILIRUB SERPL-MCNC: 0.6 MG/DL — SIGNIFICANT CHANGE UP (ref 0.2–1.2)
BUN SERPL-MCNC: 8 MG/DL — SIGNIFICANT CHANGE UP (ref 7–23)
CALCIUM SERPL-MCNC: 8.5 MG/DL — SIGNIFICANT CHANGE UP (ref 8.4–10.5)
CHLORIDE SERPL-SCNC: 100 MMOL/L — SIGNIFICANT CHANGE UP (ref 96–108)
CO2 SERPL-SCNC: 24 MMOL/L — SIGNIFICANT CHANGE UP (ref 22–31)
CREAT SERPL-MCNC: 0.78 MG/DL — SIGNIFICANT CHANGE UP (ref 0.5–1.3)
CULTURE RESULTS: SIGNIFICANT CHANGE UP
D DIMER BLD IA.RAPID-MCNC: 2865 NG/ML DDU — HIGH
EGFR: 102 ML/MIN/1.73M2 — SIGNIFICANT CHANGE UP
EOSINOPHIL # BLD AUTO: 0.06 K/UL — SIGNIFICANT CHANGE UP (ref 0–0.5)
EOSINOPHIL NFR BLD AUTO: 1 % — SIGNIFICANT CHANGE UP (ref 0–6)
GLUCOSE SERPL-MCNC: 98 MG/DL — SIGNIFICANT CHANGE UP (ref 70–99)
HCT VFR BLD CALC: 26 % — LOW (ref 39–50)
HCV AB S/CO SERPL IA: 0.2 S/CO — SIGNIFICANT CHANGE UP (ref 0–0.99)
HCV AB SERPL-IMP: SIGNIFICANT CHANGE UP
HGB BLD-MCNC: 8.2 G/DL — LOW (ref 13–17)
IMM GRANULOCYTES NFR BLD AUTO: 0.5 % — SIGNIFICANT CHANGE UP (ref 0–1.5)
LYMPHOCYTES # BLD AUTO: 0.35 K/UL — LOW (ref 1–3.3)
LYMPHOCYTES # BLD AUTO: 5.8 % — LOW (ref 13–44)
MAGNESIUM SERPL-MCNC: 2.2 MG/DL — SIGNIFICANT CHANGE UP (ref 1.6–2.6)
MCHC RBC-ENTMCNC: 25.4 PG — LOW (ref 27–34)
MCHC RBC-ENTMCNC: 31.5 GM/DL — LOW (ref 32–36)
MCV RBC AUTO: 80.5 FL — SIGNIFICANT CHANGE UP (ref 80–100)
MONOCYTES # BLD AUTO: 0.29 K/UL — SIGNIFICANT CHANGE UP (ref 0–0.9)
MONOCYTES NFR BLD AUTO: 4.8 % — SIGNIFICANT CHANGE UP (ref 2–14)
NEUTROPHILS # BLD AUTO: 5.28 K/UL — SIGNIFICANT CHANGE UP (ref 1.8–7.4)
NEUTROPHILS NFR BLD AUTO: 87.6 % — HIGH (ref 43–77)
NRBC # BLD: 0 /100 WBCS — SIGNIFICANT CHANGE UP (ref 0–0)
PHOSPHATE SERPL-MCNC: 3.9 MG/DL — SIGNIFICANT CHANGE UP (ref 2.5–4.5)
PLATELET # BLD AUTO: 315 K/UL — SIGNIFICANT CHANGE UP (ref 150–400)
POTASSIUM SERPL-MCNC: 4.5 MMOL/L — SIGNIFICANT CHANGE UP (ref 3.5–5.3)
POTASSIUM SERPL-SCNC: 4.5 MMOL/L — SIGNIFICANT CHANGE UP (ref 3.5–5.3)
PROT SERPL-MCNC: 6.2 G/DL — SIGNIFICANT CHANGE UP (ref 6–8.3)
RBC # BLD: 3.23 M/UL — LOW (ref 4.2–5.8)
RBC # FLD: 30.3 % — HIGH (ref 10.3–14.5)
SODIUM SERPL-SCNC: 136 MMOL/L — SIGNIFICANT CHANGE UP (ref 135–145)
SPECIMEN SOURCE: SIGNIFICANT CHANGE UP
WBC # BLD: 6.03 K/UL — SIGNIFICANT CHANGE UP (ref 3.8–10.5)
WBC # FLD AUTO: 6.03 K/UL — SIGNIFICANT CHANGE UP (ref 3.8–10.5)

## 2022-03-08 PROCEDURE — 99223 1ST HOSP IP/OBS HIGH 75: CPT

## 2022-03-08 PROCEDURE — 99255 IP/OBS CONSLTJ NEW/EST HI 80: CPT

## 2022-03-08 RX ORDER — REMDESIVIR 5 MG/ML
200 INJECTION INTRAVENOUS EVERY 24 HOURS
Refills: 0 | Status: COMPLETED | OUTPATIENT
Start: 2022-03-08 | End: 2022-03-08

## 2022-03-08 RX ORDER — LANOLIN ALCOHOL/MO/W.PET/CERES
3 CREAM (GRAM) TOPICAL ONCE
Refills: 0 | Status: COMPLETED | OUTPATIENT
Start: 2022-03-08 | End: 2022-03-08

## 2022-03-08 RX ORDER — REMDESIVIR 5 MG/ML
INJECTION INTRAVENOUS
Refills: 0 | Status: COMPLETED | OUTPATIENT
Start: 2022-03-08 | End: 2022-03-12

## 2022-03-08 RX ORDER — REMDESIVIR 5 MG/ML
100 INJECTION INTRAVENOUS EVERY 24 HOURS
Refills: 0 | Status: COMPLETED | OUTPATIENT
Start: 2022-03-09 | End: 2022-03-12

## 2022-03-08 RX ADMIN — Medication 20 MILLIGRAM(S): at 21:26

## 2022-03-08 RX ADMIN — Medication 3 MILLIGRAM(S): at 01:33

## 2022-03-08 RX ADMIN — Medication 20 MILLIGRAM(S): at 13:28

## 2022-03-08 RX ADMIN — LOSARTAN POTASSIUM 100 MILLIGRAM(S): 100 TABLET, FILM COATED ORAL at 05:26

## 2022-03-08 RX ADMIN — POLYETHYLENE GLYCOL 3350 17 GRAM(S): 17 POWDER, FOR SOLUTION ORAL at 11:57

## 2022-03-08 RX ADMIN — PANTOPRAZOLE SODIUM 40 MILLIGRAM(S): 20 TABLET, DELAYED RELEASE ORAL at 05:25

## 2022-03-08 RX ADMIN — SENNA PLUS 2 TABLET(S): 8.6 TABLET ORAL at 21:26

## 2022-03-08 RX ADMIN — REMDESIVIR 500 MILLIGRAM(S): 5 INJECTION INTRAVENOUS at 18:16

## 2022-03-08 RX ADMIN — Medication 0.5 MILLIGRAM(S): at 22:00

## 2022-03-08 RX ADMIN — OXYCODONE HYDROCHLORIDE 5 MILLIGRAM(S): 5 TABLET ORAL at 05:29

## 2022-03-08 RX ADMIN — ENOXAPARIN SODIUM 40 MILLIGRAM(S): 100 INJECTION SUBCUTANEOUS at 18:16

## 2022-03-08 NOTE — CONSULT NOTE ADULT - ASSESSMENT
63 y/o M with PMH of Stage IV gastric adenocarcinoma w/ mets to peritoneum (dx 11/2021), HTN, Hx of PE (s/p Lovenox & IVC filter - now off Lovenox d/t to recent melena). Presents with cough/SOB. He went to [facility] and imaging there showed worsening disease progression and patient was prescribed Augmentin and discharged home. Now with continued symptoms, worsening cough, low grade temp. Found to be COVID positive in ED, CTA chest negative for PE, lymphangitic spread.

## 2022-03-08 NOTE — PROGRESS NOTE ADULT - SUBJECTIVE AND OBJECTIVE BOX
SUBJECTIVE / OVERNIGHT EVENTS:pt seen and examined  22     MEDICATIONS  (STANDING):  enoxaparin Injectable 40 milliGRAM(s) SubCutaneous every 12 hours  losartan 100 milliGRAM(s) Oral daily  methylPREDNISolone sodium succinate Injectable 20 milliGRAM(s) IV Push three times a day  pantoprazole    Tablet 40 milliGRAM(s) Oral before breakfast  polyethylene glycol 3350 17 Gram(s) Oral daily  remdesivir  IVPB   IV Intermittent   senna 2 Tablet(s) Oral at bedtime    MEDICATIONS  (PRN):  acetaminophen     Tablet .. 650 milliGRAM(s) Oral every 4 hours PRN Temp greater or equal to 38.5C (101.3F)  ALBUTerol    90 MICROgram(s) HFA Inhaler 2 Puff(s) Inhalation every 4 hours PRN Shortness of Breath and/or Wheezing  benzonatate 200 milliGRAM(s) Oral three times a day PRN Cough  clonazePAM  Tablet 0.5 milliGRAM(s) Oral at bedtime PRN anxiety  hydrocodone/homatropine Syrup 5 milliLiter(s) Oral every 6 hours PRN Cough  ondansetron Injectable 4 milliGRAM(s) IV Push every 8 hours PRN Nausea and/or Vomiting  oxyCODONE    IR 5 milliGRAM(s) Oral every 4 hours PRN Severe Pain (7 - 10)    T(C): 36.9 (22 @ 20:54), Max: 37.7 (22 @ 05:07)  HR: 98 (22 @ 20:54) (98 - 106)  BP: 122/74 (22 @ 20:54) (109/70 - 122/78)  RR: 18 (22 @ 20:54) (18 - 20)  SpO2: 98% (22 @ 20:54) (98% - 100%)    CAPILLARY BLOOD GLUCOSE        I&O's Summary    08 Mar 2022 07:  -  08 Mar 2022 23:36  --------------------------------------------------------  IN: 480 mL / OUT: 0 mL / NET: 480 mL        Constitutional: No fever, fatigue  Skin: No rash.  Eyes: No recent vision problems or eye pain.  ENT: No congestion, ear pain, or sore throat.  Cardiovascular: No chest pain or palpation.  Respiratory: No cough, shortness of breath, congestion, or wheezing.  Gastrointestinal: No abdominal pain, nausea, vomiting, or diarrhea.  Genitourinary: No dysuria.  Musculoskeletal: No joint swelling.  Neurologic: No headache.    PHYSICAL EXAM:  GENERAL: NAD  EYES: EOMI, PERRLA  NECK: Supple, No JVD  CHEST/LUNG: dec breath sounds at bases   HEART:  S1 , S2 +  ABDOMEN: soft , bs+  EXTREMITIES:  trace edema  NEUROLOGY:alert awake oriented      LABS:                        8.2    6.03  )-----------( 315      ( 08 Mar 2022 06:59 )             26.0     03-08    136  |  100  |  8   ----------------------------<  98  4.5   |  24  |  0.78    Ca    8.5      08 Mar 2022 06:59  Phos  3.9     03-08  Mg     2.2     03-08    TPro  6.2  /  Alb  3.2<L>  /  TBili  0.6  /  DBili  x   /  AST  25  /  ALT  20  /  AlkPhos  79  03-08          Urinalysis Basic - ( 07 Mar 2022 19:26 )    Color: Colorless / Appearance: Clear / S.027 / pH: x  Gluc: x / Ketone: Negative  / Bili: Negative / Urobili: Negative   Blood: x / Protein: Negative / Nitrite: Negative   Leuk Esterase: Negative / RBC: 1 /hpf / WBC 0 /HPF   Sq Epi: x / Non Sq Epi: 0 /hpf / Bacteria: Negative        RADIOLOGY & ADDITIONAL TESTS:    Imaging Personally Reviewed:    Consultant(s) Notes Reviewed:      Care Discussed with Consultants/Other Providers:

## 2022-03-08 NOTE — CONSULT NOTE ADULT - PROBLEM SELECTOR RECOMMENDATION 2
found to be Covid + in the ED  currently normal O2 sat on RA c/w oxycodone 5mg q4hr PRN for pain  bowel regimen

## 2022-03-08 NOTE — CONSULT NOTE ADULT - SUBJECTIVE AND OBJECTIVE BOX
HPI:  63yo M w/ PMHx of Stage IV gastric adenocarcinoma w/ mets to peritoneum, HTN, Hx of PE (s/p Lovenox & IVC filter) presents for cough/SOB, patient was initially Dx w/ gastric adenocarcinoma 2021 and received FOLFOX/Nivolumab, initially with response, however patient reports suffering cough/SOB with clear sputum that has progressively worsened over the past week, he went to [facility] and imaging there showed worsening disease progression and patient was prescribed Augment and discharged home, today he reports continued symptoms, he endorses a fever last night of 100.2 and had 1 episode of dark stool that resolved, he previously was on lovenox for a PE, had IVC filter placed and was previously taken off therapeutic lovenox due to anemia, he follows with Dr. Brad Rojas at [facility] for [place]ology, in the ED, he was hemodynamically stable, afebrile to 102, mildly tachypneic but saturating well on room air, labs were significant for borderline low microcytic anemia (unknown baseline) and COVID positive, CTA chest negative for PE, oncology was consulted in the ED, patient admitted to general medicine for further management.   (07 Mar 2022 21:53)    PERTINENT PM/SXH:   Gastric adenocarcinoma    Hypertension      S/P IVC filter    Anorectal abscess      FAMILY HISTORY:  FHx: stroke    FH: colon cancer      ITEMS NOT CHECKED ARE NOT PRESENT    SOCIAL HISTORY:   Significant other/partner[ ]  Children[ ]  Mormonism/Spirituality:  Substance hx:  [ ]   Tobacco hx:  [ ]   Alcohol hx: [ ]   Home Opioid hx:  [ ] I-Stop Reference No:  Living Situation: [ ]Home  [ ]Long term care  [ ]Rehab [ ]Other    ADVANCE DIRECTIVES:    DNR  MOLST  [ ]  Living Will  [ ]   DECISION MAKER(s):  [ ] Health Care Proxy(s)  [ ] Surrogate(s)  [ ] Guardian           Name(s): Phone Number(s):    BASELINE (I)ADL(s) (prior to admission):  Emanuel: [ ]Total  [ ] Moderate [ ]Dependent    Allergies    No Known Allergies    Intolerances    MEDICATIONS  (STANDING):  enoxaparin Injectable 40 milliGRAM(s) SubCutaneous every 12 hours  losartan 100 milliGRAM(s) Oral daily  pantoprazole    Tablet 40 milliGRAM(s) Oral before breakfast  polyethylene glycol 3350 17 Gram(s) Oral daily  senna 2 Tablet(s) Oral at bedtime    MEDICATIONS  (PRN):  acetaminophen     Tablet .. 650 milliGRAM(s) Oral every 4 hours PRN Temp greater or equal to 38.5C (101.3F)  ALBUTerol    90 MICROgram(s) HFA Inhaler 2 Puff(s) Inhalation every 4 hours PRN Shortness of Breath and/or Wheezing  benzonatate 200 milliGRAM(s) Oral three times a day PRN Cough  clonazePAM  Tablet 0.5 milliGRAM(s) Oral at bedtime PRN anxiety  hydrocodone/homatropine Syrup 5 milliLiter(s) Oral every 6 hours PRN Cough  ondansetron Injectable 4 milliGRAM(s) IV Push every 8 hours PRN Nausea and/or Vomiting  oxyCODONE    IR 5 milliGRAM(s) Oral every 4 hours PRN Severe Pain (7 - 10)    PRESENT SYMPTOMS: [ ]Unable to obtain due to poor mentation   Source if other than patient:  [ ]Family   [ ]Team     Pain: [X]yes [ ]no  QOL impact -   Location -  epigastrium                   Aggravating factors - cough   Quality - dull  Radiation -  Timing- constant  Severity (0-10 scale):  Minimal acceptable level (0-10 scale):     CPOT:    https://www.[facility].org/getattachment/[email or web address]/Critical-Care-Pain-Observation-Tool-(CPOT)      PAIN AD Score:     http://geriatrictoolkit.[facility]/cog/painad.pdf (press ctrl +  left click to view)    Dyspnea:                           [X]Mild [ ]Moderate [ ]Severe  Anxiety:                             [ ]Mild [ ]Moderate [ ]Severe  Fatigue:                             [ ]Mild [ ]Moderate [ ]Severe  Nausea:                             [ ]Mild [ ]Moderate [ ]Severe  Loss of appetite:              [X]Mild [ ]Moderate [ ]Severe  Constipation:                    [ ]Mild [ ]Moderate [ ]Severe    Other Symptoms:  [ ]All other review of systems negative     Palliative Performance Status Version 2:       80 %    http://npcrc.org/files/news/palliative_performance_scale_ppsv2.pdf  PHYSICAL EXAM:  Vital Signs Last 24 Hrs  T(C): 37.7 (08 Mar 2022 05:07), Max: 38.4 (07 Mar 2022 16:34)  T(F): 99.9 (08 Mar 2022 05:07), Max: 101.2 (07 Mar 2022 16:34)  HR: 106 (08 Mar 2022 05:07) (101 - 119)  BP: 122/78 (08 Mar 2022 05:07) (117/90 - 146/88)  BP(mean): --  RR: 20 (08 Mar 2022 05:07) (20 - 24)  SpO2: 98% (08 Mar 2022 05:07) (97% - 99%) I&O's Summary    08 Mar 2022 07:01  -  08 Mar 2022 12:09  --------------------------------------------------------  IN: 240 mL / OUT: 0 mL / NET: 240 mL      GENERAL:  [X ]Alert  [X]Oriented x3   [ ]Lethargic  [ ]Cachexia  [ ]Unarousable  [ ]Verbal  [ ]Non-Verbal  Behavioral:   [ ] Anxiety  [ ] Delirium [ ] Agitation [ ] Other  HEENT:  [X]Normal   [ ]Dry mouth   [ ]ET Tube/Trach  [ ]Oral lesions  PULMONARY:   [ ]Clear [ ]Tachypnea  [ ]Audible excessive secretions   [ ]Rhonchi        [ ]Right [ ]Left [ ]Bilateral  [ ]Crackles        [ ]Right [ ]Left [ ]Bilateral  [X]Wheezing     [ ]Right [ ]Left [X]Bilateral  [ ]Diminished breath sounds [ ]right [ ]left [ ]bilateral  CARDIOVASCULAR:    [X]Regular [ ]Irregular [ ]Tachy  [ ]Owen [ ]Murmur [ ]Other  GASTROINTESTINAL:  [X]Soft  [ ]Distended   [ ]+BS  [ ]Non tender [X]Tender  [ ]PEG [ ]OGT/ NGT  Last BM:   GENITOURINARY:  [X]Normal [ ] Incontinent   [ ]Oliguria/Anuria   [ ]Daly  MUSCULOSKELETAL:   [ ]Normal   [ ]Weakness  [ ]Bed/Wheelchair bound [ ]Edema  NEUROLOGIC:   [X]No focal deficits  [ ]Cognitive impairment  [ ]Dysphagia [ ]Dysarthria [ ]Paresis [ ]Other   SKIN:   [X]Normal    [ ]Rash  [ ]Pressure ulcer(s)       Present on admission [ ]y [ ]n    CRITICAL CARE:  [ ] Shock Present  [ ]Septic [ ]Cardiogenic [ ]Neurologic [ ]Hypovolemic  [ ]  Vasopressors [ ]  Inotropes   [ ]Respiratory failure present [ ]Mechanical ventilation [ ]Non-invasive ventilatory support [ ]High flow  [ ]Acute  [ ]Chronic [ ]Hypoxic  [ ]Hypercarbic [ ]Other  [ ]Other organ failure     LABS:                        8.2    6.03  )-----------( 315      ( 08 Mar 2022 06:59 )             26.0   03-08    136  |  100  |  8   ----------------------------<  98  4.5   |  24  |  0.78    Ca    8.5      08 Mar 2022 06:59  Phos  3.9     03-08  Mg     2.2     03-08    TPro  6.2  /  Alb  3.2<L>  /  TBili  0.6  /  DBili  x   /  AST  25  /  ALT  20  /  AlkPhos  79  03-08      Urinalysis Basic - ( 07 Mar 2022 19:26 )    Color: Colorless / Appearance: Clear / S.027 / pH: x  Gluc: x / Ketone: Negative  / Bili: Negative / Urobili: Negative   Blood: x / Protein: Negative / Nitrite: Negative   Leuk Esterase: Negative / RBC: 1 /hpf / WBC 0 /HPF   Sq Epi: x / Non Sq Epi: 0 /hpf / Bacteria: Negative      RADIOLOGY & ADDITIONAL STUDIES:    PROTEIN CALORIE MALNUTRITION PRESENT: [ ]mild [ ]moderate [ ]severe [ ]underweight [ ]morbid obesity  https://www.andeal.org/vault/4794/web/files/ONC/Table_Clinical%20Characteristics%20to%20Document%20Malnutrition-White%20JV%20et%20al%2020.pdf    Height (cm): 165.1 (22 @ 14:01)  Weight (kg): 66.2 (22 @ 14:01)  BMI (kg/m2): 24.3 (22 @ 14:01)    [ ]PPSV2 < or = to 30% [ ]significant weight loss  [ ]poor nutritional intake  [ ]anasarca      [ ]Artificial Nutrition      REFERRALS:   [ ]Chaplaincy  [ ]Hospice  [ ]Child Life  [ ]Social Work  [ ]Case management [ ]Holistic Therapy     Goals of Care Document:        HPI:  61yo M w/ PMHx of Stage IV gastric adenocarcinoma w/ mets to peritoneum, HTN, Hx of PE (s/p Lovenox & IVC filter) presents for cough/SOB, patient was initially Dx w/ gastric adenocarcinoma 2021 and received FOLFOX/Nivolumab, initially with response, however patient reports suffering cough/SOB with clear sputum that has progressively worsened over the past week, he went to [facility] and imaging there showed worsening disease progression and patient was prescribed Augment and discharged home, today he reports continued symptoms, he endorses a fever last night of 100.2 and had 1 episode of dark stool that resolved, he previously was on lovenox for a PE, had IVC filter placed and was previously taken off therapeutic lovenox due to anemia, he follows with Dr. Brad Rojas at [facility] [place]ology, in the ED, he was hemodynamically stable, afebrile to 102, mildly tachypneic but saturating well on room air, labs were significant for borderline low microcytic anemia (unknown baseline) and COVID positive, CTA chest negative for PE, oncology was consulted in the ED, patient admitted to general medicine for further management.   (07 Mar 2022 21:53)    PERTINENT PM/SXH:   Gastric adenocarcinoma    Hypertension      S/P IVC filter    Anorectal abscess      FAMILY HISTORY:  FHx: stroke    FH: colon cancer      ITEMS NOT CHECKED ARE NOT PRESENT    SOCIAL HISTORY:   Significant other/partner[ ]  Children[ ]  Oriental orthodox/Spirituality:  Substance hx:  [ ]   Tobacco hx:  [ ]   Alcohol hx: [ ]   Home Opioid hx:  [x] I-Stop Reference No: [id number]  Living Situation: [x]Home  [ ]Long term care  [ ]Rehab [ ]Other    ADVANCE DIRECTIVES:    DNR  MOLST  [ ]  Living Will  [ ]   DECISION MAKER(s): SELF  [ ] Health Care Proxy(s)  [ ] Surrogate(s)  [ ] Guardian           Name(s): Phone Number(s):    BASELINE (I)ADL(s) (prior to admission):  Gifford: [x]Total  [ ] Moderate [ ]Dependent    Allergies    No Known Allergies    Intolerances    MEDICATIONS  (STANDING):  enoxaparin Injectable 40 milliGRAM(s) SubCutaneous every 12 hours  losartan 100 milliGRAM(s) Oral daily  pantoprazole    Tablet 40 milliGRAM(s) Oral before breakfast  polyethylene glycol 3350 17 Gram(s) Oral daily  senna 2 Tablet(s) Oral at bedtime    MEDICATIONS  (PRN):  acetaminophen     Tablet .. 650 milliGRAM(s) Oral every 4 hours PRN Temp greater or equal to 38.5C (101.3F)  ALBUTerol    90 MICROgram(s) HFA Inhaler 2 Puff(s) Inhalation every 4 hours PRN Shortness of Breath and/or Wheezing  benzonatate 200 milliGRAM(s) Oral three times a day PRN Cough  clonazePAM  Tablet 0.5 milliGRAM(s) Oral at bedtime PRN anxiety  hydrocodone/homatropine Syrup 5 milliLiter(s) Oral every 6 hours PRN Cough  ondansetron Injectable 4 milliGRAM(s) IV Push every 8 hours PRN Nausea and/or Vomiting  oxyCODONE    IR 5 milliGRAM(s) Oral every 4 hours PRN Severe Pain (7 - 10)    PRESENT SYMPTOMS: [ ]Unable to obtain due to poor mentation   Source if other than patient:  [ ]Family   [ ]Team     Pain: [X]yes [ ]no  QOL impact -   Location -  epigastrium                   Aggravating factors - cough   Quality - dull  Radiation -  Timing- constant  Severity (0-10 scale):  Minimal acceptable level (0-10 scale):     CPOT:    https://www.[facility].org/getattachment/[email or web address]/Critical-Care-Pain-Observation-Tool-(CPOT)      PAIN AD Score:     http://geriatrictoolkit.[facility]/cog/painad.pdf (press ctrl +  left click to view)    Dyspnea:                           [X]Mild [ ]Moderate [ ]Severe  Anxiety:                             [ ]Mild [ ]Moderate [ ]Severe  Fatigue:                             [ ]Mild [ ]Moderate [ ]Severe  Nausea:                             [ ]Mild [ ]Moderate [ ]Severe  Loss of appetite:              [X]Mild [ ]Moderate [ ]Severe  Constipation:                    [ ]Mild [ ]Moderate [ ]Severe    Other Symptoms:  +Cough  [x]All other review of systems negative     Palliative Performance Status Version 2:       80 %    http://[facility].org/files/news/palliative_performance_scale_ppsv2.pdf  PHYSICAL EXAM:  Vital Signs Last 24 Hrs  T(C): 37.7 (08 Mar 2022 05:07), Max: 38.4 (07 Mar 2022 16:34)  T(F): 99.9 (08 Mar 2022 05:07), Max: 101.2 (07 Mar 2022 16:34)  HR: 106 (08 Mar 2022 05:07) (101 - 119)  BP: 122/78 (08 Mar 2022 05:07) (117/90 - 146/88)  BP(mean): --  RR: 20 (08 Mar 2022 05:07) (20 - 24)  SpO2: 98% (08 Mar 2022 05:07) (97% - 99%) I&O's Summary    08 Mar 2022 07:01  -  08 Mar 2022 12:09  --------------------------------------------------------  IN: 240 mL / OUT: 0 mL / NET: 240 mL      GENERAL:  [X ]Alert  [X]Oriented x3   [ ]Lethargic  [ ]Cachexia  [ ]Unarousable  [ ]Verbal  [ ]Non-Verbal  Behavioral:   [ ] Anxiety  [ ] Delirium [ ] Agitation [ ] Other  HEENT:  [X]Normal   [ ]Dry mouth   [ ]ET Tube/Trach  [ ]Oral lesions  PULMONARY:   [ ]Clear [ ]Tachypnea  [ ]Audible excessive secretions   [ ]Rhonchi        [ ]Right [ ]Left [ ]Bilateral  [ ]Crackles        [ ]Right [ ]Left [ ]Bilateral  [X]Wheezing     [ ]Right [ ]Left [X]Bilateral  [ ]Diminished breath sounds [ ]right [ ]left [ ]bilateral  CARDIOVASCULAR:    [X]Regular [ ]Irregular [ ]Tachy  [ ]Owen [ ]Murmur [ ]Other  GASTROINTESTINAL:  [X]Soft  [ ]Distended   [ ]+BS  [ ]Non tender [X]Tender  [ ]PEG [ ]OGT/ NGT  Last BM:   GENITOURINARY:  [X]Normal [ ] Incontinent   [ ]Oliguria/Anuria   [ ]Daly  MUSCULOSKELETAL:   [ ]Normal   [ ]Weakness  [ ]Bed/Wheelchair bound [ ]Edema  NEUROLOGIC:   [X]No focal deficits  [ ]Cognitive impairment  [ ]Dysphagia [ ]Dysarthria [ ]Paresis [ ]Other   SKIN:   [X]Normal    [ ]Rash  [ ]Pressure ulcer(s)       Present on admission [ ]y [ ]n    CRITICAL CARE:  [ ] Shock Present  [ ]Septic [ ]Cardiogenic [ ]Neurologic [ ]Hypovolemic  [ ]  Vasopressors [ ]  Inotropes   [ ]Respiratory failure present [ ]Mechanical ventilation [ ]Non-invasive ventilatory support [ ]High flow  [ ]Acute  [ ]Chronic [ ]Hypoxic  [ ]Hypercarbic [ ]Other  [ ]Other organ failure     LABS:                        8.2    6.03  )-----------( 315      ( 08 Mar 2022 06:59 )             26.0   03-08    136  |  100  |  8   ----------------------------<  98  4.5   |  24  |  0.78    Ca    8.5      08 Mar 2022 06:59  Phos  3.9     03-08  Mg     2.2     03-08    TPro  6.2  /  Alb  3.2<L>  /  TBili  0.6  /  DBili  x   /  AST  25  /  ALT  20  /  AlkPhos  79  03-08      Urinalysis Basic - ( 07 Mar 2022 19:26 )    Color: Colorless / Appearance: Clear / S.027 / pH: x  Gluc: x / Ketone: Negative  / Bili: Negative / Urobili: Negative   Blood: x / Protein: Negative / Nitrite: Negative   Leuk Esterase: Negative / RBC: 1 /hpf / WBC 0 /HPF   Sq Epi: x / Non Sq Epi: 0 /hpf / Bacteria: Negative      RADIOLOGY & ADDITIONAL STUDIES:    < from: CT Angio Chest PE Protocol w/ IV Cont (22 @ 16:19) >    ACC: [id number] EXAM:  CT ANGIO CHEST PULM [facility]                          PROCEDURE DATE:  2022          INTERPRETATION:  Reason for Exam: Shortness of breath. chest pain.    CTA of the chest was performed from the thoracic inlet to the level of   the adrenal glands following IV contrast injection of  80 cc of Omnipaque   350. No immediate complications were reported.  MIP images were also   created and reviewed.    Comparison: Chest x-ray of same date    Tubes/Lines: Right Mediport catheter seen with tip in SVC    Mediastinum and Heart: Aorta and pulmonary arteries are normal in size.   Thyroid gland is unremarkable. There is conglomerate lymphadenopathy in   the mediastinum including the prevascular, right left paratracheal, AP   window subcarinal and right hilar stations. The largest area of   lymphadenopathy is conglomerate lymphadenopathy in the right hilum   measuring approximately 3 x 3.9 cm. There is narrowing of the proximal   right and left mainstem bronchi from conglomerate lymphadenopathy. No   pericardial effusion.    Lungs, Pleura, and Airways: There is no pulmonary embolus. Bilateral   thickening of the peribronchial vascular interstitium along with areas of   septal thickening suggestive of lymphangitic spread.    Small bilateral pleural effusions noted.    Visualized Abdomen: IVC filter is noted in place. A partially imaged left   adrenal mass measures 4.3 x 3.2 cm.    Bones and soft tissues: Unremarkable.    IMPRESSION:    No pulmonary embolus.    Conglomerate lymphadenopathy in the mediastinal along with thickening of   the bronchovascular interstitium suggestive of lymphangitic spread.    Small bilateral pleural effusions.    Partially imaged left adrenal mass measuring 4.3 x 3.2 cm consistent with   metastatic disease.    --- End of Report ---      QIANA MERIDA MD; Attending Radiologist  This document has been electronically signed. Mar  7 2022  4:34PM    < end of copied text >      PROTEIN CALORIE MALNUTRITION PRESENT: [ ]mild [ ]moderate [ ]severe [ ]underweight [ ]morbid obesity  https://www.andeal.org/vault/2440/web/files/ONC/Table_Clinical%20Characteristics%20to%20Document%20Malnutrition-White%20JV%20et%20al%2020.pdf    Height (cm): 165.1 (22 @ 14:01)  Weight (kg): 66.2 (22 @ 14:01)  BMI (kg/m2): 24.3 (22 @ 14:01)    [ ]PPSV2 < or = to 30% [ ]significant weight loss  [ ]poor nutritional intake  [ ]anasarca      [ ]Artificial Nutrition      REFERRALS:   [ ]Chaplaincy  [ ]Hospice  [ ]Child Life  [ ]Social Work  [ ]Case management [ ]Holistic Therapy     Goals of Care Document:

## 2022-03-08 NOTE — CONSULT NOTE ADULT - PROBLEM SELECTOR RECOMMENDATION 2
+COVID PCR  -Fully vaccinated  -Start Remdesivir x 5 days (monitor creatinine, LFTs)  -Normoxic  -DVT ppx  -Pt with recent melena, has been refusing Lovenox as directed by his outpatient oncologist. Pt with IVC filter in place, CTA chest neg PE, monitor ddimer.

## 2022-03-08 NOTE — CHART NOTE - NSCHARTNOTEFT_GEN_A_CORE
HPI:  61yo M w/ PMHx of Stage IV gastric adenocarcinoma w/ mets to peritoneum, HTN, Hx of PE (s/p Lovenox & IVC filter) presents for cough/SOB, patient was initially Dx w/ gastric adenocarcinoma 11/2021 and received FOLFOX/Nivolumab, initially with response, however patient reports suffering cough/SOB with clear sputum that has progressively worsened over the past week.  He was found to be COVID positive on 3/7/22 and is on airborne precautions.    interval hx:     He went to [facility] and imaging there showed worsening disease progression and patient was prescribed Augment and discharged home, today he reports continued symptoms, he endorses a fever last night of 100.2 and had 1 episode of dark stool that resolved, he previously was on lovenox for a PE, had IVC filter placed and was previously taken off therapeutic lovenox due to anemia, he follows with Dr. Brad Rojas at [facility] [place], in the ED, he was hemodynamically stable, afebrile to 102, mildly tachypneic but saturating well on room air, labs were significant for borderline low microcytic anemia (unknown baseline) and COVID positive, CTA chest negative for PE, oncology was consulted in the ED, patient admitted to general medicine for further management.   (07 Mar 2022 21:53)    oncologist: [facility] Dr. Rojas.  Was planned to see a radiation oncologist at [facility] also but has not yet met him: Dr. Forrest.    We are consulted over concerns of a GI Bleed, not yet seen or evaluated by GI.   Would recommend endoscopic evaluation.  Will d/w Dr. Walton.          < from: CT Angio Chest PE Protocol w/ IV Cont (03.07.22 @ 16:19) >    IMPRESSION:    No pulmonary embolus.    Conglomerate lymphadenopathy in the mediastinal along with thickening of   the bronchovascular interstitium suggestive of lymphangitic spread.    Small bilateral pleural effusions.    Partially imaged left adrenal mass measuring 4.3 x 3.2 cm consistent with   metastatic disease.

## 2022-03-08 NOTE — CONSULT NOTE ADULT - ATTENDING COMMENTS
Agree with above. Pain control. recommend palliative consult for pain control and symptom management with persistent cough. Consider radiation consult for palliative radiation if pain not well controlled. Trial of tessalon perles 200mg PO TID PRN to see if that would help with cough until we can get better disease control for lung mets.
maintain sat>90%
62yr old M w/ hx of HTN, Stage IV gastric adenocarcinoma w/ mets to the peritoneum, PE s/p lovenox and IVC filter presented w/ cough and SOB. Diagnosed w/ gastric adenocarcinoma in 11/2021, follows w/ Dr. Rojas. Pt w/ initial response to chemotherapy but w/ CT C/A/P in 3/2/22 demonstrating significant progression of disease and lymphangitic spread. Pt started on second line chemo on 3/4/22 and being evaluated for palliative RT. Currently w/ cough, SOB, and abdominal pain and found to be Covid positive in the ED. Additionally pt reports dark colored stools and concern for melena. Palliative care consulted for symptom management.     Pt reports abdominal pain is adequately controlled with home medication of oxycodone 5mg q4h PRN. Pt reports having persistent cough for the last 2 weeks which is more distressful to him. Cough possibly 2/2 progression of disease and/or COVID. Recommend continuing tesslon perles and starting hycodan 5ml q6h PRN. Radiation oncology evaluation pending. Recommendations discussed with primary team ACP.     Will continue to follow for symptom management.     For acute issues or uncontrolled symptoms please page palliative team.    Yumiko Gottlieb MD  Geriatrics and Palliative Medicine Attending  [facility] pager: [phone]

## 2022-03-08 NOTE — PROGRESS NOTE ADULT - ASSESSMENT
Patient is a 62 year old male under care at [facility] Dr. Brad Rojas with stage IV gastric adenocarcinoma (pMMR, HER-2 0+, CPS <1) with mets to the peritoneum. First cycle of chemotherapy was FOLFOX/Nivolumab - patient initially had response but was admitted to [facility] for SOB, cough and found to have progression of disease. CT chest/abdomen/pelvis 3/2/2022 showed significant progression of diease and lymphangitic spread. Patient is also reporting dark bowel movements, strongly concerning for melena.    Patient received one dose of paclitaxel 80 mg/m2 on 3/4/2022. RT consult was placed but he has not yet seen Dr. Forrest of [facility]. He is now at [facility] with ongoing SOB, cough and dark stools.    Stage IV Gastric Cancer  --Under Care by Dr. Brad Rojas at [facility]  --S/P second line of chemotherapy - Paclitaxel 80 mg/m2 - started on 3/4/2022  --Patient was also being evaluated for palliative RT  --Requested RT consult for palliative RT as requested by Dr. Rojas (pain and bleeding)  --Palliative consult appreciated for symptom management with persistent cough.    Anemia  --Most likely multifactorial  --Anemia of chronic disease/chemotherapy  --FOBT negative  --Iron work up ordered, pending iron, TIBC, Ferritin  --If patient found to be iron deficient, would consider giving IV iron replacement    COVID-19 Positive, Shortness of Breath, Fever  --Lymphangitic spread on CT  --COVID-19 +  --CTA negative for pulmonary embolism, Conglomerate lymphadenopathy in the mediastinal along with thickening of the bronchovascular interstitium suggestive of lymphangitic spread. Small bilateral pleural effusions. Partially imaged left adrenal mass measuring 4.3 x 3.2 cm consistent with metastatic disease  --Pulmonary input appreciated    We will continue to follow patient and coordinate with [facility].    Jurgen Mcmhaan PA-C  Hematology/Oncology  [facility]  [phone] (office)  [phone] (alt office)  Evenings and weekends please call MD on call or office

## 2022-03-08 NOTE — CONSULT NOTE ADULT - PROBLEM SELECTOR RECOMMENDATION 9
CTA chest with conglomerate lymphadenopathy in the mediastinal along with thickening of the bronchovascular interstitium suggestive of lymphangitic spread  -Pt normoxic but with persistent cough, SOB. Start hycodan 5cc q6h, solumedrol 20mg IVP q8h.  -If cough unchanged, can add codeine 15mg PO q6h in addition to Hycodan (hold for sedation)

## 2022-03-08 NOTE — CONSULT NOTE ADULT - PROBLEM SELECTOR RECOMMENDATION 4
Palliative care consulted for symptom management will continue to follow for symptom management Stage IV w/ metastatic disease and lymphangitic spread seen on CT C/A/P on 3/2/22  Follows w/ Dr. Rojas at [facility]  Currently on second line chemo after progression of disease on first line agents  Being evaluated for palliative RT

## 2022-03-08 NOTE — CONSULT NOTE ADULT - ASSESSMENT
62yr old M w/ hx of HTN, Stage IV gastric adenocarcinoma w/ mets to the peritoneum, PE s/p lovenox and IVC filter presented w/ cough and SOB. Diagnosed w/ gastric adenocarcinoma in 11/2021, follows w/ Dr. Rojas. Pt w/ initial response to chemotherapy but w/ CT C/A/P in 3/2/22 demonstrating significant progression of disease and lymphangitic spread. Pt started on second line chemo on 3/4/22 and being evaluated for palliative RT. Currently w/ cough, SOB, and abdominal pain and found to be Covid positive in the ED. Additionally pt reports dark colored stools and concern for melena. Palliative care consulted for symptom management.

## 2022-03-08 NOTE — CONSULT NOTE ADULT - SUBJECTIVE AND OBJECTIVE BOX
PULMONARY CONSULT    HPI: 63 y/o M with PMH of Stage IV gastric adenocarcinoma w/ mets to peritoneum (dx 2021), HTN, Hx of PE (s/p Lovenox & IVC filter - now off Lovenox d/t to recent melena). Presents with cough/SOB. He went to [facility] and imaging there showed worsening disease progression and patient was prescribed Augmentin and discharged home. Now with continued symptoms, worsening cough, low grade temp. Found to be COVID positive in ED, CTA chest negative for PE, lymphangitic spread. Endorses pale yellow, clear secretions, +FRAIRE and worsening cough. Denies CP, pleuritic CP.     PAST MEDICAL & SURGICAL HISTORY:  Gastric adenocarcinoma  stage 4  Hypertension  S/P IVC filter  Anorectal abscess      Allergies  No Known Allergies      FAMILY HISTORY:  FHx: stroke    FH: colon cancer      Social history: never a smoker     Review of Systems:  CONSTITUTIONAL: Per above   EYES: No eye pain, visual disturbances, or discharge  ENMT:  No difficulty hearing, tinnitus, vertigo; No sinus or throat pain  NECK: No pain or stiffness  RESPIRATORY: Per above  CARDIOVASCULAR: No chest pain, palpitations, dizziness, or leg swelling  GASTROINTESTINAL: No abdominal or epigastric pain. No nausea, vomiting, or hematemesis; No diarrhea or constipation. No melena or hematochezia.  GENITOURINARY: No dysuria, frequency, hematuria, or incontinence  NEUROLOGICAL: No headaches, memory loss, loss of strength, numbness, or tremors  SKIN: No itching, burning, rashes, or lesions   MUSCULOSKELETAL: No joint pain or swelling; No muscle, back, or extremity pain  PSYCHIATRIC: No depression, anxiety, mood swings, or difficulty sleeping      Medications:  MEDICATIONS  (STANDING):  enoxaparin Injectable 40 milliGRAM(s) SubCutaneous every 12 hours  losartan 100 milliGRAM(s) Oral daily  methylPREDNISolone sodium succinate Injectable 20 milliGRAM(s) IV Push three times a day  pantoprazole    Tablet 40 milliGRAM(s) Oral before breakfast  polyethylene glycol 3350 17 Gram(s) Oral daily  senna 2 Tablet(s) Oral at bedtime    MEDICATIONS  (PRN):  acetaminophen     Tablet .. 650 milliGRAM(s) Oral every 4 hours PRN Temp greater or equal to 38.5C (101.3F)  ALBUTerol    90 MICROgram(s) HFA Inhaler 2 Puff(s) Inhalation every 4 hours PRN Shortness of Breath and/or Wheezing  benzonatate 200 milliGRAM(s) Oral three times a day PRN Cough  clonazePAM  Tablet 0.5 milliGRAM(s) Oral at bedtime PRN anxiety  hydrocodone/homatropine Syrup 5 milliLiter(s) Oral every 6 hours PRN Cough  ondansetron Injectable 4 milliGRAM(s) IV Push every 8 hours PRN Nausea and/or Vomiting  oxyCODONE    IR 5 milliGRAM(s) Oral every 4 hours PRN Severe Pain (7 - 10)            Vital Signs Last 24 Hrs  T(C): 37.7 (08 Mar 2022 05:07), Max: 38.4 (07 Mar 2022 16:34)  T(F): 99.9 (08 Mar 2022 05:07), Max: 101.2 (07 Mar 2022 16:34)  HR: 106 (08 Mar 2022 05:07) (101 - 119)  BP: 122/78 (08 Mar 2022 05:07) (117/90 - 146/88)  BP(mean): --  RR: 20 (08 Mar 2022 05:07) (20 - 24)  SpO2: 98% (08 Mar 2022 05:07) (97% - 99%)      VBG pH 7.39 03-07 @ 15:24  VBG pCO2 46 03-07 @ 15:24  VBG O2 sat 27.4 03-07 @ 15:24  VBG lactate 1.5 03-07 @ 15:24              LABS:                        8.2    6.03  )-----------( 315      ( 08 Mar 2022 06:59 )             26.0     03-08    136  |  100  |  8   ----------------------------<  98  4.5   |  24  |  0.78    Ca    8.5      08 Mar 2022 06:59  Phos  3.9     03-08  Mg     2.2     03-08    TPro  6.2  /  Alb  3.2<L>  /  TBili  0.6  /  DBili  x   /  AST  25  /  ALT  20  /  AlkPhos  79  03-08          CAPILLARY BLOOD GLUCOSE          Urinalysis Basic - ( 07 Mar 2022 19:26 )    Color: Colorless / Appearance: Clear / S.027 / pH: x  Gluc: x / Ketone: Negative  / Bili: Negative / Urobili: Negative   Blood: x / Protein: Negative / Nitrite: Negative   Leuk Esterase: Negative / RBC: 1 /hpf / WBC 0 /HPF   Sq Epi: x / Non Sq Epi: 0 /hpf / Bacteria: Negative        Serum Pro-Brain Natriuretic Peptide: 48 pg/mL (22 @ 15:27)            Physical Examination:    General: No acute distress.      HEENT: Pupils equal, reactive to light.  Symmetric.    PULM: coarse bilaterally     CVS: S1, S2    ABD: Soft, nondistended, nontender, normoactive bowel sounds, no masses    EXT: No edema, nontender    SKIN: Warm and well perfused, no rashes noted.    NEURO: Alert, oriented, interactive, nonfocal      RADIOLOGY REVIEWED  CT chest: < from: CT Angio Chest PE Protocol w/ IV Cont (22 @ 16:19) >  Comparison: Chest x-ray of same date    Tubes/Lines: Right Mediport catheter seen with tip in SVC    Mediastinum and Heart: Aorta and pulmonary arteries are normal in size.   Thyroid gland is unremarkable. There is conglomerate lymphadenopathy in   the mediastinum including the prevascular, right left paratracheal, AP   window subcarinal and right hilar stations. The largest area of   lymphadenopathy is conglomerate lymphadenopathy in the right hilum   measuring approximately 3 x 3.9 cm. There is narrowing of the proximal   right and left mainstem bronchi from conglomerate lymphadenopathy. No   pericardial effusion.    Lungs, Pleura, and Airways: There is no pulmonary embolus. Bilateral   thickening of the peribronchial vascular interstitium along with areas of   septal thickening suggestive of lymphangitic spread.    Small bilateral pleural effusions noted.    Visualized Abdomen: IVC filter is noted in place. A partially imaged left   adrenal mass measures 4.3 x 3.2 cm.    Bones and soft tissues: Unremarkable.    IMPRESSION:    No pulmonary embolus.    Conglomerate lymphadenopathy in the mediastinal along with thickening of   the bronchovascular interstitium suggestive of lymphangitic spread.    Small bilateral pleural effusions.    Partially imaged left adrenal mass measuring 4.3 x 3.2 cm consistent with   metastatic disease.    < end of copied text >

## 2022-03-08 NOTE — PROGRESS NOTE ADULT - SUBJECTIVE AND OBJECTIVE BOX
Patient is a 62y old  Male who presents with a chief complaint of Cough/SOB (08 Mar 2022 13:33)    Pt seen and examined at bedside this morning.    MEDICATIONS  (STANDING):  enoxaparin Injectable 40 milliGRAM(s) SubCutaneous every 12 hours  losartan 100 milliGRAM(s) Oral daily  methylPREDNISolone sodium succinate Injectable 20 milliGRAM(s) IV Push three times a day  pantoprazole    Tablet 40 milliGRAM(s) Oral before breakfast  polyethylene glycol 3350 17 Gram(s) Oral daily  remdesivir  IVPB   IV Intermittent   remdesivir  IVPB 200 milliGRAM(s) IV Intermittent every 24 hours  senna 2 Tablet(s) Oral at bedtime    MEDICATIONS  (PRN):  acetaminophen     Tablet .. 650 milliGRAM(s) Oral every 4 hours PRN Temp greater or equal to 38.5C (101.3F)  ALBUTerol    90 MICROgram(s) HFA Inhaler 2 Puff(s) Inhalation every 4 hours PRN Shortness of Breath and/or Wheezing  benzonatate 200 milliGRAM(s) Oral three times a day PRN Cough  clonazePAM  Tablet 0.5 milliGRAM(s) Oral at bedtime PRN anxiety  hydrocodone/homatropine Syrup 5 milliLiter(s) Oral every 6 hours PRN Cough  ondansetron Injectable 4 milliGRAM(s) IV Push every 8 hours PRN Nausea and/or Vomiting  oxyCODONE    IR 5 milliGRAM(s) Oral every 4 hours PRN Severe Pain (7 - 10)      ROS  No fever, sweats, chills  No epistaxis, HA, sore throat  No CP, SOB, cough, sputum  No n/v/d, abd pain, melena, hematochezia  No rash  No anxiety  No back pain, joint pain  No bleeding, bruising  No dysuria, hematuria    Vital Signs Last 24 Hrs  T(C): 37.7 (08 Mar 2022 13:47), Max: 38.4 (07 Mar 2022 16:34)  T(F): 99.9 (08 Mar 2022 13:47), Max: 101.2 (07 Mar 2022 16:34)  HR: 105 (08 Mar 2022 13:47) (101 - 115)  BP: 109/70 (08 Mar 2022 13:47) (109/70 - 136/92)  BP(mean): --  RR: 18 (08 Mar 2022 13:47) (18 - 24)  SpO2: 100% (08 Mar 2022 13:47) (97% - 100%)    PE  NAD  Awake, alert  Anicteric, MMM  RRR  CTAB  Abd soft, NT, ND  No c/c/e  No rash grossly  FROM                          8.2    6.03  )-----------( 315      ( 08 Mar 2022 06:59 )             26.0       03-08    136  |  100  |  8   ----------------------------<  98  4.5   |  24  |  0.78    Ca    8.5      08 Mar 2022 06:59  Phos  3.9     03-08  Mg     2.2     03-08    TPro  6.2  /  Alb  3.2<L>  /  TBili  0.6  /  DBili  x   /  AST  25  /  ALT  20  /  AlkPhos  79  03-08        ACC: [id number] [facility]                          PROCEDURE DATE:  03/07/2022          INTERPRETATION:  Reason for Exam: Shortness of breath. chest pain.    CTA of the chest was performed from the thoracic inlet to the level of   the adrenal glands following IV contrast injection of  80 cc of Omnipaque   350. No immediate complications were reported.  MIP images were also   created and reviewed.    Comparison: Chest x-ray of same date    Tubes/Lines: Right Mediport catheter seen with tip in SVC    Mediastinum and Heart: Aorta and pulmonary arteries are normal in size.   Thyroid gland is unremarkable. There is conglomerate lymphadenopathy in   the mediastinum including the prevascular, right left paratracheal, AP   window subcarinal and right hilar stations. The largest area of   lymphadenopathy is conglomerate lymphadenopathy in the right hilum   measuring approximately 3 x 3.9 cm. There is narrowing of the proximal   right and left mainstem bronchi from conglomerate lymphadenopathy. No   pericardial effusion.    Lungs, Pleura, and Airways: There is no pulmonary embolus. Bilateral   thickening of the peribronchial vascular interstitium along with areas of   septal thickening suggestive of lymphangitic spread.    Small bilateral pleural effusions noted.    Visualized Abdomen: IVC filter is noted in place. A partially imaged left   adrenal mass measures 4.3 x 3.2 cm.    Bones and soft tissues: Unremarkable.    IMPRESSION:    No pulmonary embolus.    Conglomerate lymphadenopathy in the mediastinal along with thickening of   the bronchovascular interstitium suggestive of lymphangitic spread.    Small bilateral pleural effusions.    Partially imaged left adrenal mass measuring 4.3 x 3.2 cm consistent with   metastatic disease.    --- End of Report ---    QIANA MERIDA MD; Attending Radiologist  This document has been electronically signed. Mar  7 2022  4:34PM   Patient is a 62y old  Male who presents with a chief complaint of Cough/SOB (08 Mar 2022 13:33)    Pt seen and examined at bedside this morning.    MEDICATIONS  (STANDING):  enoxaparin Injectable 40 milliGRAM(s) SubCutaneous every 12 hours  losartan 100 milliGRAM(s) Oral daily  methylPREDNISolone sodium succinate Injectable 20 milliGRAM(s) IV Push three times a day  pantoprazole    Tablet 40 milliGRAM(s) Oral before breakfast  polyethylene glycol 3350 17 Gram(s) Oral daily  remdesivir  IVPB   IV Intermittent   remdesivir  IVPB 200 milliGRAM(s) IV Intermittent every 24 hours  senna 2 Tablet(s) Oral at bedtime    MEDICATIONS  (PRN):  acetaminophen     Tablet .. 650 milliGRAM(s) Oral every 4 hours PRN Temp greater or equal to 38.5C (101.3F)  ALBUTerol    90 MICROgram(s) HFA Inhaler 2 Puff(s) Inhalation every 4 hours PRN Shortness of Breath and/or Wheezing  benzonatate 200 milliGRAM(s) Oral three times a day PRN Cough  clonazePAM  Tablet 0.5 milliGRAM(s) Oral at bedtime PRN anxiety  hydrocodone/homatropine Syrup 5 milliLiter(s) Oral every 6 hours PRN Cough  ondansetron Injectable 4 milliGRAM(s) IV Push every 8 hours PRN Nausea and/or Vomiting  oxyCODONE    IR 5 milliGRAM(s) Oral every 4 hours PRN Severe Pain (7 - 10)      ROS  No fever, sweats, chills  No epistaxis, HA, sore throat  No CP, SOB, cough, sputum  No n/v/d, abd pain, melena, hematochezia  No rash  No anxiety  No back pain, joint pain  No bleeding, bruising  No dysuria, hematuria    Vital Signs Last 24 Hrs  T(C): 37.7 (08 Mar 2022 13:47), Max: 38.4 (07 Mar 2022 16:34)  T(F): 99.9 (08 Mar 2022 13:47), Max: 101.2 (07 Mar 2022 16:34)  HR: 105 (08 Mar 2022 13:47) (101 - 115)  BP: 109/70 (08 Mar 2022 13:47) (109/70 - 136/92)  BP(mean): --  RR: 18 (08 Mar 2022 13:47) (18 - 24)  SpO2: 100% (08 Mar 2022 13:47) (97% - 100%)    PE  NAD  Awake, alert  Anicteric, MMM  No c/c/e  No rash grossly                        8.2    6.03  )-----------( 315      ( 08 Mar 2022 06:59 )             26.0       03-08    136  |  100  |  8   ----------------------------<  98  4.5   |  24  |  0.78    Ca    8.5      08 Mar 2022 06:59  Phos  3.9     03-08  Mg     2.2     03-08    TPro  6.2  /  Alb  3.2<L>  /  TBili  0.6  /  DBili  x   /  AST  25  /  ALT  20  /  AlkPhos  79  03-08        ACC: [id number] [facility]                          PROCEDURE DATE:  03/07/2022          INTERPRETATION:  Reason for Exam: Shortness of breath. chest pain.    CTA of the chest was performed from the thoracic inlet to the level of   the adrenal glands following IV contrast injection of  80 cc of Omnipaque   350. No immediate complications were reported.  MIP images were also   created and reviewed.    Comparison: Chest x-ray of same date    Tubes/Lines: Right Mediport catheter seen with tip in SVC    Mediastinum and Heart: Aorta and pulmonary arteries are normal in size.   Thyroid gland is unremarkable. There is conglomerate lymphadenopathy in   the mediastinum including the prevascular, right left paratracheal, AP   window subcarinal and right hilar stations. The largest area of   lymphadenopathy is conglomerate lymphadenopathy in the right hilum   measuring approximately 3 x 3.9 cm. There is narrowing of the proximal   right and left mainstem bronchi from conglomerate lymphadenopathy. No   pericardial effusion.    Lungs, Pleura, and Airways: There is no pulmonary embolus. Bilateral   thickening of the peribronchial vascular interstitium along with areas of   septal thickening suggestive of lymphangitic spread.    Small bilateral pleural effusions noted.    Visualized Abdomen: IVC filter is noted in place. A partially imaged left   adrenal mass measures 4.3 x 3.2 cm.    Bones and soft tissues: Unremarkable.    IMPRESSION:    No pulmonary embolus.    Conglomerate lymphadenopathy in the mediastinal along with thickening of   the bronchovascular interstitium suggestive of lymphangitic spread.    Small bilateral pleural effusions.    Partially imaged left adrenal mass measuring 4.3 x 3.2 cm consistent with   metastatic disease.    --- End of Report ---    QIANA MERIDA MD; Attending Radiologist  This document has been electronically signed. Mar  7 2022  4:34PM

## 2022-03-08 NOTE — PROGRESS NOTE ADULT - PROBLEM SELECTOR PLAN 1
-currently not hypoxic on room air  -c/w tessalon 200mg q8h PRN  -continuous pulse ox monitoring  -Albuterol PRN  -obtain D-dimer (although likely to be elevated in setting of malignancy)  -tylenol PRN fever   -fever likely secondary to COVID but will obtain blood/urine cultures-no over consolidations seen on imaging   id f/u    elevated d dimer with neg pE on cta - cont lovenox - pulm and heme to advise about dosing of lovenox

## 2022-03-08 NOTE — PROGRESS NOTE ADULT - ASSESSMENT
63yo M w/ PMHx of Stage IV gastric adenocarcinoma w/ mets to peritoneum, HTN, Hx of PE (s/p Lovenox & IVC filter) presents for cough/SOB, found to be anemic and COVID positive

## 2022-03-08 NOTE — CONSULT NOTE ADULT - SUBJECTIVE AND OBJECTIVE BOX
HPI:  63yo M w/ PMHx of Stage IV gastric adenocarcinoma w/ mets to peritoneum, HTN, Hx of PE (s/p Lovenox & IVC filter) presents for cough/SOB, patient was initially Dx w/ gastric adenocarcinoma 2021 and received FOLFOX/Nivolumab, initially with response, however patient reports suffering cough/SOB with clear sputum that has progressively worsened over the past week, he went to [facility] and imaging there showed worsening disease progression and patient was prescribed Augment and discharged home, today he reports continued symptoms, he endorses a fever last night of 100.2 and had 1 episode of dark stool that resolved, he previously was on lovenox for a PE, had IVC filter placed and was previously taken off therapeutic lovenox due to anemia, he follows with Dr. Brad Rojas at [facility] for [place]ology, in the ED, he was hemodynamically stable, afebrile to 102, mildly tachypneic but saturating well on room air, labs were significant for borderline low microcytic anemia (unknown baseline) and COVID positive, CTA chest negative for PE, oncology was consulted in the ED, patient admitted to general medicine for further management.       PAST MEDICAL & SURGICAL HISTORY:  Gastric adenocarcinoma  stage 4    Hypertension    S/P IVC filter    Anorectal abscess    Allergies    No Known Allergies    Intolerances    ANTIMICROBIALS:  remdesivir  IVPB        OTHER MEDS:  acetaminophen     Tablet .. 650 milliGRAM(s) Oral every 4 hours PRN  ALBUTerol    90 MICROgram(s) HFA Inhaler 2 Puff(s) Inhalation every 4 hours PRN  benzonatate 200 milliGRAM(s) Oral three times a day PRN  clonazePAM  Tablet 0.5 milliGRAM(s) Oral at bedtime PRN  enoxaparin Injectable 40 milliGRAM(s) SubCutaneous every 12 hours  hydrocodone/homatropine Syrup 5 milliLiter(s) Oral every 6 hours PRN  losartan 100 milliGRAM(s) Oral daily  methylPREDNISolone sodium succinate Injectable 20 milliGRAM(s) IV Push three times a day  ondansetron Injectable 4 milliGRAM(s) IV Push every 8 hours PRN  oxyCODONE    IR 5 milliGRAM(s) Oral every 4 hours PRN  pantoprazole    Tablet 40 milliGRAM(s) Oral before breakfast  polyethylene glycol 3350 17 Gram(s) Oral daily  senna 2 Tablet(s) Oral at bedtime    SOCIAL HISTORY: Denies smoking, alcohol, drug use.    FAMILY HISTORY:  FHx: stroke    FH: colon cancer in mother    Drug Dosing Weight  Height (cm): 165.1 (07 Mar 2022 14:01)  Weight (kg): 66.2 (07 Mar 2022 14:01)  BMI (kg/m2): 24.3 (07 Mar 2022 14:01)  BSA (m2): 1.73 (07 Mar 2022 14:01)    PE:    Vital Signs Last 24 Hrs  T(C): 37.7 (08 Mar 2022 05:07), Max: 38.4 (07 Mar 2022 16:34)  T(F): 99.9 (08 Mar 2022 05:07), Max: 101.2 (07 Mar 2022 16:34)  HR: 106 (08 Mar 2022 05:07) (101 - 119)  BP: 122/78 (08 Mar 2022 05:07) (117/90 - 146/88)  BP(mean): --  RR: 20 (08 Mar 2022 05:07) (20 - 24)  SpO2: 98% (08 Mar 2022 05:07) (97% - 99%)    Gen: AOx3, NAD  CV: S1+S2 normal, no murmurs  Resp: Clear bilat, no resp distress  Abd: Soft, nontender, +BS  Ext: No LE edema, no wounds  : No [place]  IV/Skin: No thrombophlebitis, R chest mediport intact  Msk: No low back pain, no arthralgias, no joint swelling  Neuro: No sensory deficits, no motor deficits    LABS:                          8.2    6.03  )-----------( 315      ( 08 Mar 2022 06:59 )             26.0       03-08    136  |  100  |  8   ----------------------------<  98  4.5   |  24  |  0.78    Ca    8.5      08 Mar 2022 06:59  Phos  3.9     03-08  Mg     2.2     03-08    TPro  6.2  /  Alb  3.2<L>  /  TBili  0.6  /  DBili  x   /  AST  25  /  ALT  20  /  AlkPhos  79  03-08      Urinalysis Basic - ( 07 Mar 2022 19:26 )    Color: Colorless / Appearance: Clear / S.027 / pH: x  Gluc: x / Ketone: Negative  / Bili: Negative / Urobili: Negative   Blood: x / Protein: Negative / Nitrite: Negative   Leuk Esterase: Negative / RBC: 1 /hpf / WBC 0 /HPF   Sq Epi: x / Non Sq Epi: 0 /hpf / Bacteria: Negative    MICROBIOLOGY:  v    RADIOLOGY:    < from: CT Angio Chest PE Protocol w/ IV Cont (22 @ 16:19) >  IMPRESSION:    No pulmonary embolus.    Conglomerate lymphadenopathy in the mediastinal along with thickening of   the bronchovascular interstitium suggestive of lymphangitic spread.    Small bilateral pleural effusions.    Partially imaged left adrenal mass measuring 4.3 x 3.2 cm consistent with   metastatic disease.    < end of copied text >

## 2022-03-08 NOTE — CONSULT NOTE ADULT - PROBLEM SELECTOR RECOMMENDATION 9
reports 1 week of cough, worse w/ talking and lying flat  possibly 2/2 to Covid vs. lymphangitic spread of gastric adenocarcinoma   c/w tessalon megan  start hycodan 5mL q6hr PRN for cough reports 1 week of cough, worse w/ talking and lying flat  possibly 2/2 to Covid vs. lymphangitic spread of gastric adenocarcinoma   c/w tessalon megan   start hycodan 5mL q6hr PRN for cough

## 2022-03-08 NOTE — CONSULT NOTE ADULT - ASSESSMENT
62 year old male with Stage 4 Gastric Adenocarcinoma with mets to the peritoneum, HTN, PE s/p Lovenox and IVC filter, presenting with worsening sob and cough. On FOLFOX/ Nivolumab, was recently seen at [facility] and prescribed augmentin, but with worsening so came to the ED. In the ED with fever 101.2F, WBC WNL, not neutropenic, remains on room air, though with persistent cough. Received the Pfizer vaccine, most recent booster in Nov 2021. CTA chest with no PE, but with lymphadeopathy in the mediastinal along with thickening of the bronchovascular interstitium suggestive of lymphangitic spread. R chest mediport intact.    Recommend:  #COVID infection without hypoxia  -Continue supporitve care  -Given admitted for COVID symptoms, not eligible for Sotrovimab  -Given immunosuppressed, would start Remdesivir (ordered)  -Monitor inflammatory markers  -Trend D-dimer (elevated though CT chest neg for PE. Also with IVC filter in place)  -Check Procalcitonin in the AM  -F/U blood cultures, would monitor off abx at this time    #Fevers  -Suspect from COVID  -Continue to monitor fever curve    #Stage 4 adenocarcinoma with lymphangitic spread  -Pt remains very dyspneic while speaking - seen with Pulm at bedside and started on Solumedrol.  -Onc evaluation    Michel Restrepo MD  Available through MS Teams  If no response, or after 5pm/weekends, call [phone]

## 2022-03-08 NOTE — PROGRESS NOTE ADULT - PROBLEM SELECTOR PLAN 2
-follows with [facility] following at [facility]  -s/p second line chemotherapy Paclitaxel - started on 3/4/2022  -c/w home pain regimen oxycodone IR 5mg q4h PRN  -bowel regimen   -heme onc f/u

## 2022-03-08 NOTE — CONSULT NOTE ADULT - PROBLEM SELECTOR RECOMMENDATION 3
Stage IV w/ metastatic disease and lymphangitic spread seen on CT C/A/P on 3/2/22  Follows w/ Dr. Rojas at [facility] [place]  Currently on second line chemo after progression of disease on first line agents  Being evaluated for palliative RT  c/w oxycodone 5mg q4hr PRN for pain  bowel regimen found to be Covid + in the ED  currently normal O2 sat on RA

## 2022-03-09 LAB
ALBUMIN SERPL ELPH-MCNC: 3.4 G/DL — SIGNIFICANT CHANGE UP (ref 3.3–5)
ALP SERPL-CCNC: 84 U/L — SIGNIFICANT CHANGE UP (ref 40–120)
ALT FLD-CCNC: 17 U/L — SIGNIFICANT CHANGE UP (ref 10–45)
ANION GAP SERPL CALC-SCNC: 11 MMOL/L — SIGNIFICANT CHANGE UP (ref 5–17)
AST SERPL-CCNC: 20 U/L — SIGNIFICANT CHANGE UP (ref 10–40)
BASOPHILS # BLD AUTO: 0.01 K/UL — SIGNIFICANT CHANGE UP (ref 0–0.2)
BASOPHILS NFR BLD AUTO: 0.2 % — SIGNIFICANT CHANGE UP (ref 0–2)
BILIRUB SERPL-MCNC: 0.4 MG/DL — SIGNIFICANT CHANGE UP (ref 0.2–1.2)
BUN SERPL-MCNC: 12 MG/DL — SIGNIFICANT CHANGE UP (ref 7–23)
CALCIUM SERPL-MCNC: 8.9 MG/DL — SIGNIFICANT CHANGE UP (ref 8.4–10.5)
CHLORIDE SERPL-SCNC: 99 MMOL/L — SIGNIFICANT CHANGE UP (ref 96–108)
CO2 SERPL-SCNC: 25 MMOL/L — SIGNIFICANT CHANGE UP (ref 22–31)
CREAT SERPL-MCNC: 0.8 MG/DL — SIGNIFICANT CHANGE UP (ref 0.5–1.3)
EGFR: 101 ML/MIN/1.73M2 — SIGNIFICANT CHANGE UP
EOSINOPHIL # BLD AUTO: 0 K/UL — SIGNIFICANT CHANGE UP (ref 0–0.5)
EOSINOPHIL NFR BLD AUTO: 0 % — SIGNIFICANT CHANGE UP (ref 0–6)
FERRITIN SERPL-MCNC: 636 NG/ML — HIGH (ref 30–400)
GLUCOSE SERPL-MCNC: 149 MG/DL — HIGH (ref 70–99)
HCT VFR BLD CALC: 27.6 % — LOW (ref 39–50)
HGB BLD-MCNC: 8.7 G/DL — LOW (ref 13–17)
IMM GRANULOCYTES NFR BLD AUTO: 0.4 % — SIGNIFICANT CHANGE UP (ref 0–1.5)
IRON SATN MFR SERPL: 15 % — LOW (ref 16–55)
IRON SATN MFR SERPL: 33 UG/DL — LOW (ref 45–165)
LYMPHOCYTES # BLD AUTO: 0.44 K/UL — LOW (ref 1–3.3)
LYMPHOCYTES # BLD AUTO: 8.9 % — LOW (ref 13–44)
MAGNESIUM SERPL-MCNC: 2.5 MG/DL — SIGNIFICANT CHANGE UP (ref 1.6–2.6)
MCHC RBC-ENTMCNC: 25.3 PG — LOW (ref 27–34)
MCHC RBC-ENTMCNC: 31.5 GM/DL — LOW (ref 32–36)
MCV RBC AUTO: 80.2 FL — SIGNIFICANT CHANGE UP (ref 80–100)
MONOCYTES # BLD AUTO: 0.13 K/UL — SIGNIFICANT CHANGE UP (ref 0–0.9)
MONOCYTES NFR BLD AUTO: 2.6 % — SIGNIFICANT CHANGE UP (ref 2–14)
NEUTROPHILS # BLD AUTO: 4.36 K/UL — SIGNIFICANT CHANGE UP (ref 1.8–7.4)
NEUTROPHILS NFR BLD AUTO: 87.9 % — HIGH (ref 43–77)
NRBC # BLD: 0 /100 WBCS — SIGNIFICANT CHANGE UP (ref 0–0)
PLATELET # BLD AUTO: 379 K/UL — SIGNIFICANT CHANGE UP (ref 150–400)
POTASSIUM SERPL-MCNC: 4.9 MMOL/L — SIGNIFICANT CHANGE UP (ref 3.5–5.3)
POTASSIUM SERPL-SCNC: 4.9 MMOL/L — SIGNIFICANT CHANGE UP (ref 3.5–5.3)
PROT SERPL-MCNC: 6.2 G/DL — SIGNIFICANT CHANGE UP (ref 6–8.3)
RBC # BLD: 3.44 M/UL — LOW (ref 4.2–5.8)
RBC # FLD: 30.3 % — HIGH (ref 10.3–14.5)
SODIUM SERPL-SCNC: 135 MMOL/L — SIGNIFICANT CHANGE UP (ref 135–145)
TIBC SERPL-MCNC: 221 UG/DL — SIGNIFICANT CHANGE UP (ref 220–430)
UIBC SERPL-MCNC: 188 UG/DL — SIGNIFICANT CHANGE UP (ref 110–370)
WBC # BLD: 4.96 K/UL — SIGNIFICANT CHANGE UP (ref 3.8–10.5)
WBC # FLD AUTO: 4.96 K/UL — SIGNIFICANT CHANGE UP (ref 3.8–10.5)

## 2022-03-09 PROCEDURE — 99222 1ST HOSP IP/OBS MODERATE 55: CPT

## 2022-03-09 PROCEDURE — 99232 SBSQ HOSP IP/OBS MODERATE 35: CPT

## 2022-03-09 RX ADMIN — SENNA PLUS 2 TABLET(S): 8.6 TABLET ORAL at 21:13

## 2022-03-09 RX ADMIN — ENOXAPARIN SODIUM 40 MILLIGRAM(S): 100 INJECTION SUBCUTANEOUS at 05:54

## 2022-03-09 RX ADMIN — REMDESIVIR 500 MILLIGRAM(S): 5 INJECTION INTRAVENOUS at 17:31

## 2022-03-09 RX ADMIN — LOSARTAN POTASSIUM 100 MILLIGRAM(S): 100 TABLET, FILM COATED ORAL at 05:54

## 2022-03-09 RX ADMIN — Medication 20 MILLIGRAM(S): at 21:12

## 2022-03-09 RX ADMIN — PANTOPRAZOLE SODIUM 40 MILLIGRAM(S): 20 TABLET, DELAYED RELEASE ORAL at 05:53

## 2022-03-09 RX ADMIN — Medication 20 MILLIGRAM(S): at 17:30

## 2022-03-09 RX ADMIN — Medication 20 MILLIGRAM(S): at 05:54

## 2022-03-09 RX ADMIN — Medication 0.5 MILLIGRAM(S): at 22:03

## 2022-03-09 RX ADMIN — ENOXAPARIN SODIUM 40 MILLIGRAM(S): 100 INJECTION SUBCUTANEOUS at 17:30

## 2022-03-09 NOTE — PROGRESS NOTE ADULT - PROBLEM SELECTOR PLAN 2
c/w oxycodone 5mg q4hr PRN for pain  bowel regimen Required PO oxy 5mg x1 in last 24 hours  c/w oxycodone 5mg q4hr PRN for pain  bowel regimen

## 2022-03-09 NOTE — PROGRESS NOTE ADULT - PROBLEM SELECTOR PLAN 5
will continue to follow for symptom management  Case discussed with primary team ACP Patient's neoplasm related pain is adequately controlled on current regimen. Cough also improving. Discussed case with pulm NP and pulmonary team will continue to manage cough. No other palliative needs identified. Palliative team will sign off. Case discussed with primary team ACP.    Please reconsult as needed.    Yumiko Gottlieb MD  Geriatrics and Palliative Medicine Attending  [facility] pager: [phone]     The Geriatrics and Palliative Medicine consult service has 24/7 coverage for medical recommendations, including symptom management needs.

## 2022-03-09 NOTE — PROGRESS NOTE ADULT - SUBJECTIVE AND OBJECTIVE BOX
[facility] - Kanchan Meehan, Brenda Hernandez, Michael Rodgers Savella  Office Number: [phone]    Initial Consult Note    CHIEF COMPLAINT: Patient is a 62y old  Male who presents with a chief complaint of Cough/SOB        HPI:  63yo M w/ PMHx of Stage IV gastric adenocarcinoma w/ mets to peritoneum, HTN, Hx of PE (s/p Lovenox & IVC filter) presents for cough/SOB, patient was initially Dx w/ gastric adenocarcinoma 11/2021 and received FOLFOX/Nivolumab, initially with response, however patient reports suffering cough/SOB with clear sputum that has progressively worsened over the past week, he went to [facility] and imaging there showed worsening disease progression and patient was prescribed Augment and discharged home, today he reports continued symptoms, he endorses a fever last night of 100.2 and had 1 episode of dark stool that resolved, he previously was on lovenox for a PE, had IVC filter placed and was previously taken off therapeutic lovenox due to anemia, he follows with Dr. Brad Rojas at [facility] [place] for oncology, in the ED, he was hemodynamically stable, afebrile to 102, mildly tachypneic but saturating well on room air, labs were significant for borderline low microcytic anemia (unknown baseline) and COVID positive, CTA chest negative for PE, oncology was consulted in the ED, patient admitted to general medicine for further management.      He reports that he is getting chest pains with intense coughing.  He does not get the pain at rest or when he walks, only when he coughs.  NO reported cardiac history.  Has never been evaluated by or followed with a cardiologist.       PAST MEDICAL & SURGICAL HISTORY:  Gastric adenocarcinoma  stage 4    Hypertension    S/P IVC filter    Anorectal abscess        SOCIAL HISTORY:  No tobacco, ethanol, or drug abuse.    FAMILY HISTORY:  FHx: stroke    FH: colon cancer      No family history of acute MI or sudden cardiac death.    MEDICATIONS  (STANDING):  enoxaparin Injectable 40 milliGRAM(s) SubCutaneous every 12 hours  losartan 100 milliGRAM(s) Oral daily  methylPREDNISolone sodium succinate Injectable 20 milliGRAM(s) IV Push three times a day  pantoprazole    Tablet 40 milliGRAM(s) Oral before breakfast  polyethylene glycol 3350 17 Gram(s) Oral daily  remdesivir  IVPB   IV Intermittent   remdesivir  IVPB 100 milliGRAM(s) IV Intermittent every 24 hours  senna 2 Tablet(s) Oral at bedtime    MEDICATIONS  (PRN):  acetaminophen     Tablet .. 650 milliGRAM(s) Oral every 4 hours PRN Temp greater or equal to 38.5C (101.3F)  ALBUTerol    90 MICROgram(s) HFA Inhaler 2 Puff(s) Inhalation every 4 hours PRN Shortness of Breath and/or Wheezing  benzonatate 200 milliGRAM(s) Oral three times a day PRN Cough  clonazePAM  Tablet 0.5 milliGRAM(s) Oral at bedtime PRN anxiety  hydrocodone/homatropine Syrup 5 milliLiter(s) Oral every 6 hours PRN Cough  ondansetron Injectable 4 milliGRAM(s) IV Push every 8 hours PRN Nausea and/or Vomiting  oxyCODONE    IR 5 milliGRAM(s) Oral every 4 hours PRN Severe Pain (7 - 10)      Allergies    No Known Allergies             REVIEW OF SYSTEMS:       All other review of systems is negative unless indicated above    VITAL SIGNS:   Vital Signs Last 24 Hrs  T(C): 36.6 (09 Mar 2022 04:30), Max: 37.7 (08 Mar 2022 13:47)  T(F): 97.9 (09 Mar 2022 04:30), Max: 99.9 (08 Mar 2022 13:47)  HR: 91 (09 Mar 2022 04:30) (91 - 105)  BP: 107/68 (09 Mar 2022 04:30) (107/68 - 122/74)  BP(mean): --  RR: 18 (09 Mar 2022 04:30) (18 - 18)  SpO2: 97% (09 Mar 2022 04:30) (97% - 100%)    I&O's Summary    08 Mar 2022 07:01  -  09 Mar 2022 07:00  --------------------------------------------------------  IN: 480 mL / OUT: 0 mL / NET: 480 mL        On Exam:    Constitutional: NAD, alert and oriented x 3  Lungs:  Non-labored, breath sounds are clear bilaterally, No wheezing, rales or rhonchi  Cardiovascular: RRR.  S1 and S2 positive.  No murmurs, rubs, gallops or clicks  Gastrointestinal: Bowel Sounds present, soft, nontender.   Lymph: No peripheral edema. No cervical lymphadenopathy.  Neurological: Alert, no focal deficits  Skin: No rashes or ulcers   Psych:  Mood & affect appropriate.    LABS: All Labs Reviewed:                        8.2    6.03  )-----------( 315      ( 08 Mar 2022 06:59 )             26.0                         8.5    6.31  )-----------( 330      ( 07 Mar 2022 15:27 )             27.1     09 Mar 2022 06:37    135    |  99     |  12     ----------------------------<  149    4.9     |  25     |  0.80   08 Mar 2022 06:59    136    |  100    |  8      ----------------------------<  98     4.5     |  24     |  0.78   07 Mar 2022 15:27    135    |  97     |  10     ----------------------------<  103    4.1     |  26     |  0.80     Ca    8.9        09 Mar 2022 06:37  Ca    8.5        08 Mar 2022 06:59  Ca    8.6        07 Mar 2022 15:27  Phos  3.9       08 Mar 2022 06:59  Mg     2.5       09 Mar 2022 06:37  Mg     2.2       08 Mar 2022 06:59    TPro  6.2    /  Alb  3.4    /  TBili  0.4    /  DBili  x      /  AST  20     /  ALT  17     /  AlkPhos  84     09 Mar 2022 06:37  TPro  6.2    /  Alb  3.2    /  TBili  0.6    /  DBili  x      /  AST  25     /  ALT  20     /  AlkPhos  79     08 Mar 2022 06:59  TPro  6.6    /  Alb  3.6    /  TBili  0.6    /  DBili  x      /  AST  29     /  ALT  20     /  AlkPhos  86     07 Mar 2022 15:27          Blood Culture: Organism --  Gram Stain Blood -- Gram Stain --  Specimen Source Clean Catch Clean Catch (Midstream)  Culture-Blood --    Organism --  Gram Stain Blood -- Gram Stain --  Specimen Source .Blood Blood-Venous  Culture-Blood --    Organism --  Gram Stain Blood -- Gram Stain --  Specimen Source .Blood Blood-Venous  Culture-Blood --      03-07 @ 15:27  Pro Bnp 48        RADIOLOGY:  ct< from: CT Angio Chest PE Protocol w/ IV Cont (03.07.22 @ 16:19) >    ACC: [id number] EXAM:  CT ANGIO CHEST PULM ART [facility]                          PROCEDURE DATE:  03/07/2022          INTERPRETATION:  Reason for Exam: Shortness of breath. chest pain.    CTA of the chest was performed from the thoracic inlet to the level of   the adrenal glands following IV contrast injection of  80 cc of Omnipaque   350. No immediate complications were reported.  MIP images were also   created and reviewed.    Comparison: Chest x-ray of same date    Tubes/Lines: Right Mediport catheter seen with tip in SVC    Mediastinum and Heart: Aorta and pulmonary arteries are normal in size.   Thyroid gland is unremarkable. There is conglomerate lymphadenopathy in   the mediastinum including the prevascular, right left paratracheal, AP   window subcarinal and right hilar stations. The largest area of   lymphadenopathy is conglomerate lymphadenopathy in the right hilum   measuring approximately 3 x 3.9 cm. There is narrowing of the proximal   right and left mainstem bronchi from conglomerate lymphadenopathy. No   pericardial effusion.    Lungs, Pleura, and Airways: There is no pulmonary embolus. Bilateral   thickening of the peribronchial vascular interstitium along with areas of   septal thickening suggestive of lymphangitic spread.    Small bilateral pleural effusions noted.    Visualized Abdomen: IVC filter is noted in place. A partially imaged left   adrenal mass measures 4.3 x 3.2 cm.    Bones and soft tissues: Unremarkable.    IMPRESSION:    No pulmonary embolus.    Conglomerate lymphadenopathy in the mediastinal along with thickening of   the bronchovascular interstitium suggestive of lymphangitic spread.    Small bilateral pleural effusions.    Partially imaged left adrenal mass measuring 4.3 x 3.2 cm consistent with   metastatic disease.    --- End of Report ---            QIANA MERIDA MD; Attending Radiologist  This document has been electronically signed. Mar  7 2022  4:34PM    < end of copied text >      EKG:  Pending

## 2022-03-09 NOTE — PROGRESS NOTE ADULT - ASSESSMENT
63 y/o M with PMH of Stage IV gastric adenocarcinoma w/ mets to peritoneum (dx 11/2021), HTN, Hx of PE (s/p Lovenox & IVC filter - now off Lovenox d/t to recent melena). Presents with cough/SOB. He went to [facility] and imaging there showed worsening disease progression and patient was prescribed Augmentin and discharged home. Now with continued symptoms, worsening cough, low grade temp. Found to be COVID positive in ED, CTA chest negative for PE, lymphangitic spread.

## 2022-03-09 NOTE — PROGRESS NOTE ADULT - PROBLEM SELECTOR PLAN 1
-currently not hypoxic on room air  -c/w tessalon 200mg q8h PRN  -continuous pulse ox monitoring  -Albuterol PRN  -obtain D-dimer (although likely to be elevated in setting of malignancy)  -tylenol PRN fever   -fever likely secondary to COVID but will obtain blood/urine cultures-no over consolidations seen on imaging   id f/u    elevated d dimer with neg pE on cta - cont lovenox -

## 2022-03-09 NOTE — PROGRESS NOTE ADULT - PROBLEM SELECTOR PLAN 2
+COVID PCR  -Fully vaccinated  -Start Remdesivir x 5 days (monitor creatinine, LFTs)  -Normoxic  -DVT ppx  -Trend inflammatory markers   -Pt with IVC filter in place, CTA chest neg PE.   -Ddimer elevated, check LE duplex

## 2022-03-09 NOTE — PATIENT PROFILE ADULT - VISION (WITH CORRECTIVE LENSES IF THE PATIENT USUALLY WEARS THEM):
pt wears glasses for both reading and seeing/Normal vision: sees adequately in most situations; can see medication labels, newsprint

## 2022-03-09 NOTE — PROGRESS NOTE ADULT - SUBJECTIVE AND OBJECTIVE BOX
HPI:  63yo M w/ PMHx of Stage IV gastric adenocarcinoma w/ mets to peritoneum, HTN, Hx of PE (s/p Lovenox & IVC filter) presents for cough/SOB, patient was initially Dx w/ gastric adenocarcinoma 2021 and received FOLFOX/Nivolumab, initially with response, however patient reports suffering cough/SOB with clear sputum that has progressively worsened over the past week, he went to [facility] and imaging there showed worsening disease progression and patient was prescribed Augment and discharged home, today he reports continued symptoms, he endorses a fever last night of 100.2 and had 1 episode of dark stool that resolved, he previously was on lovenox for a PE, had IVC filter placed and was previously taken off therapeutic lovenox due to anemia, he [place] [facility], in the ED, he was hemodynamically stable, afebrile to 102, mildly tachypneic but saturating well on room air, labs were significant for borderline low microcytic anemia (unknown baseline) and COVID positive, CTA chest negative for PE, oncology was consulted in the ED, patient admitted to general medicine for further management.   (07 Mar 2022 21:53)    PERTINENT PM/SXH:   Gastric adenocarcinoma    Hypertension      S/P IVC filter    Anorectal abscess      FAMILY HISTORY:  FHx: stroke    FH: colon cancer      ITEMS NOT CHECKED ARE NOT PRESENT    SOCIAL HISTORY:   Significant other/partner[ ]  Children[ ]  Mormon/Spirituality:  Substance hx:  [ ]   Tobacco hx:  [ ]   Alcohol hx: [ ]   Home Opioid hx:  [X ] I-Stop Reference No: [id number]  Living Situation: [X]Home  [ ]Long term care  [ ]Rehab [ ]Other    ADVANCE DIRECTIVES:    DNR  MOLST  [ ]  Living Will  [ ]   DECISION MAKER(s):  [ ] Health Care Proxy(s)  [ ] Surrogate(s)  [ ] Guardian           Name(s): Phone Number(s):    BASELINE (I)ADL(s) (prior to admission):  [place]: [X]Total  [ ] Moderate [ ]Dependent    Allergies    No Known Allergies    Intolerances    MEDICATIONS  (STANDING):  enoxaparin Injectable 40 milliGRAM(s) SubCutaneous every 12 hours  losartan 100 milliGRAM(s) Oral daily  methylPREDNISolone sodium succinate Injectable 20 milliGRAM(s) IV Push three times a day  pantoprazole    Tablet 40 milliGRAM(s) Oral before breakfast  polyethylene glycol 3350 17 Gram(s) Oral daily  remdesivir  IVPB   IV Intermittent   remdesivir  IVPB 100 milliGRAM(s) IV Intermittent every 24 hours  senna 2 Tablet(s) Oral at bedtime    MEDICATIONS  (PRN):  acetaminophen     Tablet .. 650 milliGRAM(s) Oral every 4 hours PRN Temp greater or equal to 38.5C (101.3F)  ALBUTerol    90 MICROgram(s) HFA Inhaler 2 Puff(s) Inhalation every 4 hours PRN Shortness of Breath and/or Wheezing  benzonatate 200 milliGRAM(s) Oral three times a day PRN Cough  clonazePAM  Tablet 0.5 milliGRAM(s) Oral at bedtime PRN anxiety  hydrocodone/homatropine Syrup 5 milliLiter(s) Oral every 6 hours PRN Cough  ondansetron Injectable 4 milliGRAM(s) IV Push every 8 hours PRN Nausea and/or Vomiting  oxyCODONE    IR 5 milliGRAM(s) Oral every 4 hours PRN Severe Pain (7 - 10)    PRESENT SYMPTOMS: [ ]Unable to obtain due to poor mentation   Source if other than patient:  [ ]Family   [ ]Team     Pain: [X ]yes [ ]no  QOL impact -   Location - epigastrium                    Aggravating factors - cough and lying flat   Quality - dull  Radiation - sternum w/ coughing  Timing- constant  Severity (0-10 scale): 5   Minimal acceptable level (0-10 scale):     CPOT:    https://www.[facility].org/getattachment/[email or web address]/Critical-Care-Pain-Observation-Tool-(CPOT)      PAIN AD Score:     http://geriatrictoolkit.[place].[facility]/cog/painad.pdf (press ctrl +  left click to view)    Dyspnea:                           [X]Mild [ ]Moderate [ ]Severe  Anxiety:                             [ ]Mild [ ]Moderate [ ]Severe  Fatigue:                             [ ]Mild [ ]Moderate [ ]Severe  Nausea:                             [ ]Mild [ ]Moderate [ ]Severe  Loss of appetite:              [X]Mild [ ]Moderate [ ]Severe  Constipation:                    [ ]Mild [ ]Moderate [ ]Severe    Other Symptoms:  +cough  [X]All other review of systems negative     Palliative Performance Status Version 2:       80  %    http://npcrc.org/files/news/palliative_performance_scale_ppsv2.pdf  PHYSICAL EXAM:  Vital Signs Last 24 Hrs  T(C): 36.6 (09 Mar 2022 04:30), Max: 37.7 (08 Mar 2022 13:47)  T(F): 97.9 (09 Mar 2022 04:30), Max: 99.9 (08 Mar 2022 13:47)  HR: 91 (09 Mar 2022 04:30) (91 - 105)  BP: 107/68 (09 Mar 2022 04:30) (107/68 - 122/74)  BP(mean): --  RR: 18 (09 Mar 2022 04:30) (18 - 18)  SpO2: 97% (09 Mar 2022 04:30) (97% - 100%) I&O's Summary    08 Mar 2022 07:01  -  09 Mar 2022 07:00  --------------------------------------------------------  IN: 480 mL / OUT: 0 mL / NET: 480 mL      GENERAL:  [X ]Alert  [X ]Oriented x3   [ ]Lethargic  [ ]Cachexia  [ ]Unarousable  [ ]Verbal  [ ]Non-Verbal  Behavioral:   [ ] Anxiety  [ ] Delirium [ ] Agitation [ ] Other  HEENT:  [X]Normal   [ ]Dry mouth   [ ]ET Tube/Trach  [ ]Oral lesions  PULMONARY:   [ ]Clear [ ]Tachypnea  [ ]Audible excessive secretions   [ ]Rhonchi        [ ]Right [ ]Left [ ]Bilateral  [ ]Crackles        [ ]Right [ ]Left [ ]Bilateral  [X]Wheezing     [ ]Right [ ]Left [X]Bilateral  [ ]Diminished breath sounds [ ]right [ ]left [ ]bilateral  CARDIOVASCULAR:    [x]Regular [ ]Irregular [ ]Tachy  [ ]Owen [ ]Murmur [ ]Other  GASTROINTESTINAL:  [X ]Soft  [ ]Distended   [ ]+BS  [ ]Non tender [X]Tender  [ ]PEG [ ]OGT/ NGT  Last BM: 3/9  GENITOURINARY:  [X]Normal [ ] Incontinent   [ ]Oliguria/Anuria   [ ]Daly  MUSCULOSKELETAL:   [ ]Normal   [ ]Weakness  [ ]Bed/Wheelchair bound [ ]Edema  NEUROLOGIC:   [x]No focal deficits  [ ]Cognitive impairment  [ ]Dysphagia [ ]Dysarthria [ ]Paresis [ ]Other   SKIN:   [X]Normal    [ ]Rash  [ ]Pressure ulcer(s)       Present on admission [ ]y [ ]n    CRITICAL CARE:  [ ] Shock Present  [ ]Septic [ ]Cardiogenic [ ]Neurologic [ ]Hypovolemic  [ ]  Vasopressors [ ]  Inotropes   [ ]Respiratory failure present [ ]Mechanical ventilation [ ]Non-invasive ventilatory support [ ]High flow  [ ]Acute  [ ]Chronic [ ]Hypoxic  [ ]Hypercarbic [ ]Other  [ ]Other organ failure     LABS:                        8.7    4.96  )-----------( 379      ( 09 Mar 2022 06:41 )             27.6   03-    135  |  99  |  12  ----------------------------<  149<H>  4.9   |  25  |  0.80    Ca    8.9      09 Mar 2022 06:37  Phos  3.9       Mg     2.5         TPro  6.2  /  Alb  3.4  /  TBili  0.4  /  DBili  x   /  AST  20  /  ALT  17  /  AlkPhos  84  -      Urinalysis Basic - ( 07 Mar 2022 19:26 )    Color: Colorless / Appearance: Clear / S.027 / pH: x  Gluc: x / Ketone: Negative  / Bili: Negative / Urobili: Negative   Blood: x / Protein: Negative / Nitrite: Negative   Leuk Esterase: Negative / RBC: 1 /hpf / WBC 0 /HPF   Sq Epi: x / Non Sq Epi: 0 /hpf / Bacteria: Negative      RADIOLOGY & ADDITIONAL STUDIES:    PROTEIN CALORIE MALNUTRITION PRESENT: [ ]mild [ ]moderate [ ]severe [ ]underweight [ ]morbid obesity  https://www.andeal.org/vault/2440/web/files/ONC/Table_Clinical%20Characteristics%20to%20Document%20Malnutrition-White%20JV%20et%20al%2020.pdf    Height (cm): 165.1 (22 @ 05:07)  Weight (kg): 66.2 (22 @ 14:01)  BMI (kg/m2): 24.3 (22 @ 05:07)    [ ]PPSV2 < or = to 30% [ ]significant weight loss  [ ]poor nutritional intake  [ ]anasarca      [ ]Artificial Nutrition      REFERRALS:   [ ]Chaplaincy  [ ]Hospice  [ ]Child Life  [ ]Social Work  [ ]Case management [ ]Holistic Therapy     Goals of Care Document:        SUBJECTIVE AND OBJECTIVE: Pt seen and examined. Sitting up in chair comfortably. Cough improving with addition of hycodan PRN and IV steroids    OVERNIGHT EVENTS: No acute events reported.    DNR on chart:  Allergies    No Known Allergies    Intolerances    MEDICATIONS  (STANDING):  enoxaparin Injectable 40 milliGRAM(s) SubCutaneous every 12 hours  losartan 100 milliGRAM(s) Oral daily  methylPREDNISolone sodium succinate Injectable 20 milliGRAM(s) IV Push three times a day  pantoprazole    Tablet 40 milliGRAM(s) Oral before breakfast  polyethylene glycol 3350 17 Gram(s) Oral daily  remdesivir  IVPB   IV Intermittent   remdesivir  IVPB 100 milliGRAM(s) IV Intermittent every 24 hours  senna 2 Tablet(s) Oral at bedtime    MEDICATIONS  (PRN):  acetaminophen     Tablet .. 650 milliGRAM(s) Oral every 4 hours PRN Temp greater or equal to 38.5C (101.3F)  ALBUTerol    90 MICROgram(s) HFA Inhaler 2 Puff(s) Inhalation every 4 hours PRN Shortness of Breath and/or Wheezing  benzonatate 200 milliGRAM(s) Oral three times a day PRN Cough  clonazePAM  Tablet 0.5 milliGRAM(s) Oral at bedtime PRN anxiety  hydrocodone/homatropine Syrup 5 milliLiter(s) Oral every 6 hours PRN Cough  ondansetron Injectable 4 milliGRAM(s) IV Push every 8 hours PRN Nausea and/or Vomiting  oxyCODONE    IR 5 milliGRAM(s) Oral every 4 hours PRN Severe Pain (7 - 10)      ITEMS UNCHECKED ARE NOT PRESENT    PRESENT SYMPTOMS: [ ]Unable to self-report - see [ ] CPOT [ ] PAINADS [ ] RDOS  Source if other than patient:  [ ]Family   [ ]Team     Pain:  [ ]yes [x]no  QOL impact -   Location -                    Aggravating factors -  Quality -  Radiation -  Timing-  Severity (0-10 scale):  Minimal acceptable level (0-10 scale):     CPOT:    https://www.sccm.org/getattachment/jug59o12-1k0c-2t9w-2h8w-2602b7258b0z/Critical-Care-Pain-Observation-Tool-(CPOT)    PAIN AD Score:	  http://geriatrictoolkit.[facility]/cog/painad.pdf (Ctrl + left click to view)    Dyspnea:                           [x]Mild [ ]Moderate [ ]Severe    RDOS:  0 to 2  minimal or no respiratory distress   3  mild distress  4 to 6 moderate distress  >7 severe distress  https://homecareinformation.net/handouts/hen/Respiratory_Distress_Observation_Scale.pdf (Ctrl +  left click to view)     Anxiety:                             [ ]Mild [ ]Moderate [ ]Severe  Fatigue:                             [ ]Mild [ ]Moderate [ ]Severe  Nausea:                             [ ]Mild [ ]Moderate [ ]Severe  Loss of appetite:              [ ]Mild [ ]Moderate [ ]Severe  Constipation:                    [ ]Mild [ ]Moderate [ ]Severe    Other Symptoms:  [x]All other review of systems negative     Palliative Performance Status Version 2:         %      http://npcrc.org/files/news/palliative_performance_scale_ppsv2.pdf  PHYSICAL EXAM:  Vital Signs Last 24 Hrs  T(C): 36.7 (09 Mar 2022 12:01), Max: 36.9 (08 Mar 2022 20:54)  T(F): 98.1 (09 Mar 2022 12:01), Max: 98.4 (08 Mar 2022 20:54)  HR: 97 (09 Mar 2022 12:01) (91 - 98)  BP: 99/62 (09 Mar 2022 12:01) (99/62 - 122/74)  BP(mean): --  RR: 18 (09 Mar 2022 12:01) (18 - 18)  SpO2: 99% (09 Mar 2022 12:01) (97% - 99%) I&O's Summary    08 Mar 2022 07:01  -  09 Mar 2022 07:00  --------------------------------------------------------  IN: 480 mL / OUT: 0 mL / NET: 480 mL       GENERAL:  [X ]Alert  [X ]Oriented x3   [ ]Lethargic  [ ]Cachexia  [ ]Unarousable  [ ]Verbal  [ ]Non-Verbal  Behavioral:   [ ] Anxiety  [ ] Delirium [ ] Agitation [ ] Other  HEENT:  [X]Normal   [ ]Dry mouth   [ ]ET Tube/Trach  [ ]Oral lesions  PULMONARY:   [ ]Clear [ ]Tachypnea  [ ]Audible excessive secretions   [ ]Rhonchi        [ ]Right [ ]Left [ ]Bilateral  [ ]Crackles        [ ]Right [ ]Left [ ]Bilateral  [X]Wheezing     [ ]Right [ ]Left [X]Bilateral  [ ]Diminished breath sounds [ ]right [ ]left [ ]bilateral  CARDIOVASCULAR:    [x]Regular [ ]Irregular [ ]Tachy  [ ]Owen [ ]Murmur [ ]Other  GASTROINTESTINAL:  [X ]Soft  [ ]Distended   [ ]+BS  [ ]Non tender [X]Tender  [ ]PEG [ ]OGT/ NGT  Last BM: 3/9  GENITOURINARY:  [X]Normal [ ] Incontinent   [ ]Oliguria/Anuria   [ ]Daly  MUSCULOSKELETAL:   [ ]Normal   [ ]Weakness  [ ]Bed/Wheelchair bound [ ]Edema  NEUROLOGIC:   [x]No focal deficits  [ ]Cognitive impairment  [ ]Dysphagia [ ]Dysarthria [ ]Paresis [ ]Other   SKIN:   [X]Normal    [ ]Rash  [ ]Pressure ulcer(s)       Present on admission [ ]y [ ]n    CRITICAL CARE:  [ ]Shock Present  [ ]Septic [ ]Cardiogenic [ ]Neurologic [ ]Hypovolemic  [ ]Vasopressors [ ]Inotropes  [ ]Respiratory failure present [ ]Mechanical Ventilation [ ]Non-invasive ventilatory support [ ]High-Flow   [ ]Acute  [ ]Chronic [ ]Hypoxic  [ ]Hypercarbic [ ]Other  [ ]Other organ failure     LABS:                        8.7    4.96  )-----------( 379      ( 09 Mar 2022 06:41 )             27.6   03-09    135  |  99  |  12  ----------------------------<  149<H>  4.9   |  25  |  0.80    Ca    8.9      09 Mar 2022 06:37  Phos  3.9     03-08  Mg     2.5     03-09    TPro  6.2  /  Alb  3.4  /  TBili  0.4  /  DBili  x   /  AST  20  /  ALT  17  /  AlkPhos  84  03-09      Urinalysis Basic - ( 07 Mar 2022 19:26 )    Color: Colorless / Appearance: Clear / S.027 / pH: x  Gluc: x / Ketone: Negative  / Bili: Negative / Urobili: Negative   Blood: x / Protein: Negative / Nitrite: Negative   Leuk Esterase: Negative / RBC: 1 /hpf / WBC 0 /HPF   Sq Epi: x / Non Sq Epi: 0 /hpf / Bacteria: Negative      RADIOLOGY & ADDITIONAL STUDIES: No new imaging. Prior imaging reviewed.    Protein Calorie Malnutrition Present: [ ]mild [ ]moderate [ ]severe [ ]underweight [ ]morbid obesity  https://www.andeal.org/vault/2440/web/files/ONC/Table_Clinical%20Characteristics%20to%20Document%20Malnutrition-White%20JV%20et%20al%492155.pdf    Height (cm): 165.1 (22 @ 05:07)  Weight (kg): 66.2 (22 @ 14:01)  BMI (kg/m2): 24.3 (22 @ 05:07)    [ ]PPSV2 < or = 30%  [ ]significant weight loss [ ]poor nutritional intake [ ]anasarca[ ]Artificial Nutrition    REFERRALS:   [ ]Chaplaincy  [ ]Hospice  [ ]Child Life  [ ]Social Work  [ ]Case management [ ]Holistic Therapy     Goals of Care Document:

## 2022-03-09 NOTE — PROGRESS NOTE ADULT - ASSESSMENT
62 year old male with Stage 4 Gastric Adenocarcinoma with mets to the peritoneum, HTN, PE s/p Lovenox and IVC filter, presenting with worsening sob and cough. On FOLFOX/ Nivolumab, was recently seen at [facility] and prescribed augmentin, but with worsening so came to the ED. In the ED with fever 101.2F, WBC WNL, not neutropenic, remains on room air, though with persistent cough. Received the Pfizer vaccine, most recent booster in Nov 2021. CTA chest with no PE, but with lymphadeopathy in the mediastinal along with thickening of the bronchovascular interstitium suggestive of lymphangitic spread. R chest mediport intact.    Recommend:  #COVID infection without hypoxia  -Continue supporitve care  -Given admitted for COVID symptoms, not eligible for Sotrovimab  -Given immunosuppressed, would start Remdesivir (ordered)  -Monitor inflammatory markers  -Trend D-dimer (elevated though CT chest neg for PE. Also with IVC filter in place)  -F/U blood cultures, would monitor off abx at this time    #Fevers  -Suspect from COVID  -Continue to monitor fever curve    #Stage 4 adenocarcinoma with lymphangitic spread  -Dyspnea improving - on solumedrol - appreciate pulm following  -Appreciate Onc following    Michel Restrepo MD  Available through MS Teams  If no response, or after 5pm/weekends, call [phone]

## 2022-03-09 NOTE — PROGRESS NOTE ADULT - PROBLEM SELECTOR PLAN 2
-follows with [facility] following at [facility]  -s/p second line chemotherapy Paclitaxel - started on 3/4/2022  -c/w home pain regimen oxycodone IR 5mg q4h PRN  -bowel regimen   -heme onc f/u

## 2022-03-09 NOTE — PROGRESS NOTE ADULT - ASSESSMENT
Patient is a 62 year old male under care at [facility] Dr. Brad Rojas with stage IV gastric adenocarcinoma (pMMR, HER-2 0+, CPS <1) with mets to the peritoneum. First cycle of chemotherapy was FOLFOX/Nivolumab - patient initially had response but was admitted to [facility] for SOB, cough and found to have progression of disease. CT chest/abdomen/pelvis 3/2/2022 showed significant progression of diease and lymphangitic spread. Patient is also reporting dark bowel movements, strongly concerning for melena.    Patient received one dose of paclitaxel 80 mg/m2 on 3/4/2022. RT consult was placed but he has not yet seen Dr. Forrest of [facility]. He is now at [facility] with ongoing SOB, cough and dark stools.    Stage IV Gastric Cancer  --Under Care by Dr. Brad Rojas at [facility]  --S/P second line of chemotherapy - Paclitaxel 80 mg/m2 - started on 3/4/2022  --Next chemo due 3/11 - will have to postpone until after discharge  --Patient was also being evaluated for palliative RT  --Requested RT consult for palliative RT as requested by Dr. Rojas (pain and bleeding)  --Palliative consult appreciated for symptom management with persistent cough.    Anemia  --Most likely multifactorial  --Anemia of chronic disease/chemotherapy  --FOBT negative  --Iron work up shows elevated Ferritin (most likely acute phase reactant),   --If patient found to be iron deficient, would consider giving IV iron replacement    COVID-19 Positive, Shortness of Breath, Fever  --Lymphangitic spread on CT  --On 2L O2 via NC   --CTA negative for pulmonary embolism, Conglomerate lymphadenopathy in the mediastinal along with thickening of the bronchovascular interstitium suggestive of lymphangitic spread. Small bilateral pleural effusions. Partially imaged left adrenal mass measuring 4.3 x 3.2 cm consistent with metastatic disease  --Pulmonary input appreciated  --Started on Methylprednisolone 20 mg TID    COVID-19 +  --On Day 2/5 Remdisivir  --Pulmonary/ID following    We will continue to follow patient and coordinate with [facility].    Alejandro Ureña PA-C  Hematology/Oncology  [facility]   [phone] (office)  [phone] (alt office)  Evenings and weekends please call MD on call or office

## 2022-03-09 NOTE — PROGRESS NOTE ADULT - SUBJECTIVE AND OBJECTIVE BOX
CC: Patient is a 62y old  Male who presents with a chief complaint of Cough/SOB (09 Mar 2022 12:02)    ID following for COVID infection    Interval History/ROS: Patient sitting in chair, appears comfortable, less coughing today, remains on room air.    Rest of ROS negative.    Allergies  No Known Allergies    ANTIMICROBIALS:  remdesivir  IVPB    remdesivir  IVPB 100 every 24 hours      OTHER MEDS:  acetaminophen     Tablet .. 650 milliGRAM(s) Oral every 4 hours PRN  ALBUTerol    90 MICROgram(s) HFA Inhaler 2 Puff(s) Inhalation every 4 hours PRN  benzonatate 200 milliGRAM(s) Oral three times a day PRN  clonazePAM  Tablet 0.5 milliGRAM(s) Oral at bedtime PRN  enoxaparin Injectable 40 milliGRAM(s) SubCutaneous every 12 hours  hydrocodone/homatropine Syrup 5 milliLiter(s) Oral every 6 hours PRN  losartan 100 milliGRAM(s) Oral daily  methylPREDNISolone sodium succinate Injectable 20 milliGRAM(s) IV Push three times a day  ondansetron Injectable 4 milliGRAM(s) IV Push every 8 hours PRN  oxyCODONE    IR 5 milliGRAM(s) Oral every 4 hours PRN  pantoprazole    Tablet 40 milliGRAM(s) Oral before breakfast  polyethylene glycol 3350 17 Gram(s) Oral daily  senna 2 Tablet(s) Oral at bedtime      PE:    Vital Signs Last 24 Hrs  T(C): 36.6 (09 Mar 2022 04:30), Max: 37.7 (08 Mar 2022 13:47)  T(F): 97.9 (09 Mar 2022 04:30), Max: 99.9 (08 Mar 2022 13:47)  HR: 91 (09 Mar 2022 04:30) (91 - 105)  BP: 107/68 (09 Mar 2022 04:30) (107/68 - 122/74)  BP(mean): --  RR: 18 (09 Mar 2022 04:30) (18 - 18)  SpO2: 97% (09 Mar 2022 04:30) (97% - 100%)    Gen: AOx3, NAD  CV: S1+S2 normal, no murmurs  Resp: Clear bilat, no resp distress  Abd: Soft, nontender, +BS  Ext: No LE edema, no wounds  : No D[place]  IV/Skin: No thrombophlebitis, R chest mediport intact  Neuro: no focal deficits    LABS:                          8.7    4.96  )-----------( 379      ( 09 Mar 2022 06:41 )             27.6           135  |  99  |  12  ----------------------------<  149<H>  4.9   |  25  |  0.80    Ca    8.9      09 Mar 2022 06:37  Phos  3.9       Mg     2.5         TPro  6.2  /  Alb  3.4  /  TBili  0.4  /  DBili  x   /  AST  20  /  ALT  17  /  AlkPhos  84        Urinalysis Basic - ( 07 Mar 2022 19:26 )    Color: Colorless / Appearance: Clear / S.027 / pH: x  Gluc: x / Ketone: Negative  / Bili: Negative / Urobili: Negative   Blood: x / Protein: Negative / Nitrite: Negative   Leuk Esterase: Negative / RBC: 1 /hpf / WBC 0 /HPF   Sq Epi: x / Non Sq Epi: 0 /hpf / Bacteria: Negative    MICROBIOLOGY:  v  Clean Catch Clean Catch (Midstream)  22   <10,000 CFU/mL Normal Urogenital Yadira  --  --      .Blood Blood-Venous  22   No growth to date.  --  --      .Blood Blood-Venous  22   No growth to date.  --  --    RADIOLOGY:    < from: CT Angio Chest PE Protocol w/ IV Cont (22 @ 16:19) >  IMPRESSION:    No pulmonary embolus.    Conglomerate lymphadenopathy in the mediastinal along with thickening of   the bronchovascular interstitium suggestive of lymphangitic spread.    Small bilateral pleural effusions.    Partially imaged left adrenal mass measuring 4.3 x 3.2 cm consistent with   metastatic disease.      < end of copied text >

## 2022-03-09 NOTE — PATIENT PROFILE ADULT - FALL HARM RISK - HARM RISK INTERVENTIONS

## 2022-03-09 NOTE — PROGRESS NOTE ADULT - PROBLEM SELECTOR PLAN 1
reports 1 week of cough, worse w/ talking and lying flat  possibly 2/2 to Covid vs. lymphangitic spread of gastric adenocarcinoma   c/w tessalon perle   c/w hycodan 5mL q6hr PRN for cough reports 1 week of cough, worse w/ talking and lying flat  possibly 2/2 to Covid vs. lymphangitic spread of gastric adenocarcinoma   Improving with current regimen  c/w tessalon perle   c/w hycodan 5mL q6hr PRN for cough  c/w steroids per pulm

## 2022-03-09 NOTE — PROGRESS NOTE ADULT - ASSESSMENT
63yo M w/ PMHx of Stage IV gastric adenocarcinoma w/ mets to peritoneum, HTN, Hx of PE (s/p Lovenox & IVC filter) presents for cough/SOB, admitted with COVID PNA:    - No evidence of acute ischemia.  Pain clearly related to coughing.  Hs trop negative.    - Please check EKG.  Could not find on chart in PICU at time of my interview  - No evidence of volume overload  - BP well controlled on losartan. Can continue  - Supp oxygen, steroids, remdesivir for COVID PNA  - Monitor and replete lytes  - TO follow closely while admitted

## 2022-03-09 NOTE — PROGRESS NOTE ADULT - SUBJECTIVE AND OBJECTIVE BOX
SUBJECTIVE / OVERNIGHT EVENTS:pt seen and examined  22     MEDICATIONS  (STANDING):  enoxaparin Injectable 40 milliGRAM(s) SubCutaneous every 12 hours  losartan 100 milliGRAM(s) Oral daily  methylPREDNISolone sodium succinate Injectable 20 milliGRAM(s) IV Push three times a day  pantoprazole    Tablet 40 milliGRAM(s) Oral before breakfast  polyethylene glycol 3350 17 Gram(s) Oral daily  remdesivir  IVPB   IV Intermittent   remdesivir  IVPB 100 milliGRAM(s) IV Intermittent every 24 hours  senna 2 Tablet(s) Oral at bedtime    MEDICATIONS  (PRN):  acetaminophen     Tablet .. 650 milliGRAM(s) Oral every 4 hours PRN Temp greater or equal to 38.5C (101.3F)  ALBUTerol    90 MICROgram(s) HFA Inhaler 2 Puff(s) Inhalation every 4 hours PRN Shortness of Breath and/or Wheezing  benzonatate 200 milliGRAM(s) Oral three times a day PRN Cough  clonazePAM  Tablet 0.5 milliGRAM(s) Oral at bedtime PRN anxiety  hydrocodone/homatropine Syrup 5 milliLiter(s) Oral every 6 hours PRN Cough  ondansetron Injectable 4 milliGRAM(s) IV Push every 8 hours PRN Nausea and/or Vomiting  oxyCODONE    IR 5 milliGRAM(s) Oral every 4 hours PRN Severe Pain (7 - 10)    Vital Signs Last 24 Hrs  T(C): 36.4 (22 @ 21:20), Max: 36.7 (22 @ 12:01)  T(F): 97.5 (22 @ 21:20), Max: 98.1 (22 @ 12:01)  HR: 92 (22 @ 21:20) (91 - 97)  BP: 113/75 (22 @ 21:20) (99/62 - 113/75)  BP(mean): --  RR: 18 (22 @ 21:20) (18 - 18)  SpO2: 96% (22 @ 21:20) (96% - 99%)          Constitutional: No fever, fatigue  Skin: No rash.  Eyes: No recent vision problems or eye pain.  ENT: No congestion, ear pain, or sore throat.  Cardiovascular: No chest pain or palpation.  Respiratory: No cough, shortness of breath, congestion, or wheezing.  Gastrointestinal: No abdominal pain, nausea, vomiting, or diarrhea.  Genitourinary: No dysuria.  Musculoskeletal: No joint swelling.  Neurologic: No headache.    PHYSICAL EXAM:  GENERAL: NAD  EYES: EOMI, PERRLA  NECK: Supple, No JVD  CHEST/LUNG: dec breath sounds at bases   HEART:  S1 , S2 +  ABDOMEN: soft , bs+  EXTREMITIES:  trace edema  NEUROLOGY:alert awake oriented    LABS:      135  |  99  |  12  ----------------------------<  149<H>  4.9   |  25  |  0.80    Ca    8.9      09 Mar 2022 06:37  Phos  3.9     03-08  Mg     2.5         TPro  6.2  /  Alb  3.4  /  TBili  0.4  /  DBili      /  AST  20  /  ALT  17  /  AlkPhos  84      Creatinine Trend: 0.80 <--, 0.78 <--, 0.80 <--                        8.7    4.96  )-----------( 379      ( 09 Mar 2022 06:41 )             27.6     Urine Studies:  Urinalysis Basic - ( 07 Mar 2022 19:26 )    Color: Colorless / Appearance: Clear / S.027 / pH:   Gluc:  / Ketone: Negative  / Bili: Negative / Urobili: Negative   Blood:  / Protein: Negative / Nitrite: Negative   Leuk Esterase: Negative / RBC: 1 /hpf / WBC 0 /HPF   Sq Epi:  / Non Sq Epi: 0 /hpf / Bacteria: Negative              LIVER FUNCTIONS - ( 09 Mar 2022 06:37 )  Alb: 3.4 g/dL / Pro: 6.2 g/dL / ALK PHOS: 84 U/L / ALT: 17 U/L / AST: 20 U/L / GGT: x

## 2022-03-09 NOTE — PROGRESS NOTE ADULT - PROBLEM SELECTOR PLAN 4
Stage IV w/ metastatic disease and lymphangitic spread seen on CT C/A/P on 3/2/22  [place]/ [place] Rojas [place] [facility]  Currently on second line chemo after progression of disease on first line agents  Being evaluated for palliative RT Stage IV w/ metastatic disease and lymphangitic spread seen on CT C/A/P on 3/2/22  Follows w/ Dr. Rojas at [facility]  Currently on second line chemo after progression of disease on first line agents  Rad onc note appreciated

## 2022-03-09 NOTE — PROGRESS NOTE ADULT - PROBLEM SELECTOR PLAN 1
CTA chest with conglomerate lymphadenopathy in the mediastinal along with thickening of the bronchovascular interstitium suggestive of lymphangitic spread  -Pt normoxic but with persistent cough, SOB. Improving with Hycodan and steroids. Continue hycodan 5cc q6h (hold for sedation), solumedrol 20mg IVP q8h.

## 2022-03-09 NOTE — PROGRESS NOTE ADULT - SUBJECTIVE AND OBJECTIVE BOX
Patient is a 62y old  Male who presents with a chief complaint of Cough/SOB (09 Mar 2022 07:15)    Patient seen this morning. Patient was feeling better. Cough, pain improved. BM's no longer bloody or melanotic.    MEDICATIONS  (STANDING):  enoxaparin Injectable 40 milliGRAM(s) SubCutaneous every 12 hours  losartan 100 milliGRAM(s) Oral daily  methylPREDNISolone sodium succinate Injectable 20 milliGRAM(s) IV Push three times a day  pantoprazole    Tablet 40 milliGRAM(s) Oral before breakfast  polyethylene glycol 3350 17 Gram(s) Oral daily  remdesivir  IVPB   IV Intermittent   remdesivir  IVPB 100 milliGRAM(s) IV Intermittent every 24 hours  senna 2 Tablet(s) Oral at bedtime    MEDICATIONS  (PRN):  acetaminophen     Tablet .. 650 milliGRAM(s) Oral every 4 hours PRN Temp greater or equal to 38.5C (101.3F)  ALBUTerol    90 MICROgram(s) HFA Inhaler 2 Puff(s) Inhalation every 4 hours PRN Shortness of Breath and/or Wheezing  benzonatate 200 milliGRAM(s) Oral three times a day PRN Cough  clonazePAM  Tablet 0.5 milliGRAM(s) Oral at bedtime PRN anxiety  hydrocodone/homatropine Syrup 5 milliLiter(s) Oral every 6 hours PRN Cough  ondansetron Injectable 4 milliGRAM(s) IV Push every 8 hours PRN Nausea and/or Vomiting  oxyCODONE    IR 5 milliGRAM(s) Oral every 4 hours PRN Severe Pain (7 - 10)      ROS  No fever, sweats, chills  T max 99.9 yesterday pm  No epistaxis, HA, sore throat  SOB, cough, chest pain improved  No n/v/d, abd pain, melena, hematochezia  No edema  No rash  No anxiety  No back pain, joint pain  No bleeding, bruising  No dysuria, hematuria    Vital Signs Last 24 Hrs  T(C): 36.6 (09 Mar 2022 04:30), Max: 37.7 (08 Mar 2022 13:47)  T(F): 97.9 (09 Mar 2022 04:30), Max: 99.9 (08 Mar 2022 13:47)  HR: 91 (09 Mar 2022 04:30) (91 - 105)  BP: 107/68 (09 Mar 2022 04:30) (107/68 - 122/74)  BP(mean): --  RR: 18 (09 Mar 2022 04:30) (18 - 18)  SpO2: 97% (09 Mar 2022 04:30) (97% - 100%)    PE  NAD  Awake, alert  Anicteric, MMM  No c/c/e  No rash grossly                          8.7    4.96  )-----------( 379      ( 09 Mar 2022 06:41 )             27.6       03-09    135  |  99  |  12  ----------------------------<  149<H>  4.9   |  25  |  0.80    Ca    8.9      09 Mar 2022 06:37  Phos  3.9     03-08  Mg     2.5     03-09    TPro  6.2  /  Alb  3.4  /  TBili  0.4  /  DBili  x   /  AST  20  /  ALT  17  /  AlkPhos  84  03-09

## 2022-03-09 NOTE — PROGRESS NOTE ADULT - SUBJECTIVE AND OBJECTIVE BOX
Follow-up Pulm Progress Note    still with cough but significant improved compared to yesterday  SOB improving  denies CP     Medications:  MEDICATIONS  (STANDING):  enoxaparin Injectable 40 milliGRAM(s) SubCutaneous every 12 hours  losartan 100 milliGRAM(s) Oral daily  methylPREDNISolone sodium succinate Injectable 20 milliGRAM(s) IV Push three times a day  pantoprazole    Tablet 40 milliGRAM(s) Oral before breakfast  polyethylene glycol 3350 17 Gram(s) Oral daily  remdesivir  IVPB   IV Intermittent   remdesivir  IVPB 100 milliGRAM(s) IV Intermittent every 24 hours  senna 2 Tablet(s) Oral at bedtime    MEDICATIONS  (PRN):  acetaminophen     Tablet .. 650 milliGRAM(s) Oral every 4 hours PRN Temp greater or equal to 38.5C (101.3F)  ALBUTerol    90 MICROgram(s) HFA Inhaler 2 Puff(s) Inhalation every 4 hours PRN Shortness of Breath and/or Wheezing  benzonatate 200 milliGRAM(s) Oral three times a day PRN Cough  clonazePAM  Tablet 0.5 milliGRAM(s) Oral at bedtime PRN anxiety  hydrocodone/homatropine Syrup 5 milliLiter(s) Oral every 6 hours PRN Cough  ondansetron Injectable 4 milliGRAM(s) IV Push every 8 hours PRN Nausea and/or Vomiting  oxyCODONE    IR 5 milliGRAM(s) Oral every 4 hours PRN Severe Pain (7 - 10)          Vital Signs Last 24 Hrs  T(C): 36.6 (09 Mar 2022 04:30), Max: 37.7 (08 Mar 2022 13:47)  T(F): 97.9 (09 Mar 2022 04:30), Max: 99.9 (08 Mar 2022 13:47)  HR: 91 (09 Mar 2022 04:30) (91 - 105)  BP: 107/68 (09 Mar 2022 04:30) (107/68 - 122/74)  BP(mean): --  RR: 18 (09 Mar 2022 04:30) (18 - 18)  SpO2: 97% (09 Mar 2022 04:30) (97% - 100%)      VBG pH 7.39 03-07 @ 15:24    VBG pCO2 46 03-07 @ 15:24    VBG O2 sat 27.4 03-07 @ 15:24    VBG lactate 1.5 03-07 @ 15:24       @ 07:01  -   @ 07:00  --------------------------------------------------------  IN: 480 mL / OUT: 0 mL / NET: 480 mL          LABS:                        8.7    4.96  )-----------( 379      ( 09 Mar 2022 06:41 )             27.6         135  |  99  |  12  ----------------------------<  149<H>  4.9   |  25  |  0.80    Ca    8.9      09 Mar 2022 06:37  Phos  3.9       Mg     2.5         TPro  6.2  /  Alb  3.4  /  TBili  0.4  /  DBili  x   /  AST  20  /  ALT  17  /  AlkPhos  84                Urinalysis Basic - ( 07 Mar 2022 19:26 )    Color: Colorless / Appearance: Clear / S.027 / pH: x  Gluc: x / Ketone: Negative  / Bili: Negative / Urobili: Negative   Blood: x / Protein: Negative / Nitrite: Negative   Leuk Esterase: Negative / RBC: 1 /hpf / WBC 0 /HPF   Sq Epi: x / Non Sq Epi: 0 /hpf / Bacteria: Negative        Serum Pro-Brain Natriuretic Peptide: 48 pg/mL (22 @ 15:27)                CULTURES:     Culture - Blood (collected 22 @ 18:32)  Source: .Blood Blood-Venous  Preliminary Report (22 @ 19:02):    No growth to date.    Culture - Blood (collected 22 @ 18:31)  Source: .Blood Blood-Venous  Preliminary Report (22 @ 19:02):    No growth to date.        Culture - Urine (collected 22 @ 22:12)  Source: Clean Catch Clean Catch (Midstream)  Final Report (22 @ 17:11):    <10,000 CFU/mL Normal Urogenital Yadira                        Physical Examination:  PULM: Clear to auscultation bilaterally, no significant sputum production  CVS: S1, S2 heard    RADIOLOGY REVIEWED  CT chest: < from: CT Angio Chest PE Protocol w/ IV Cont (22 @ 16:19) >  Tubes/Lines: Right Mediport catheter seen with tip in SVC    Mediastinum and Heart: Aorta and pulmonary arteries are normal in size.   Thyroid gland is unremarkable. There is conglomerate lymphadenopathy in   the mediastinum including the prevascular, right left paratracheal, AP   window subcarinal and right hilar stations. The largest area of   lymphadenopathy is conglomerate lymphadenopathy in the right hilum   measuring approximately 3 x 3.9 cm. There is narrowing of the proximal   right and left mainstem bronchi from conglomerate lymphadenopathy. No   pericardial effusion.    Lungs, Pleura, and Airways: There is no pulmonary embolus. Bilateral   thickening of the peribronchial vascular interstitium along with areas of   septal thickening suggestive of lymphangitic spread.    Small bilateral pleural effusions noted.    Visualized Abdomen: IVC filter is noted in place. A partially imaged left   adrenal mass measures 4.3 x 3.2 cm.    Bones and soft tissues: Unremarkable.    IMPRESSION:    No pulmonary embolus.    Conglomerate lymphadenopathy in the mediastinal along with thickening of   the bronchovascular interstitium suggestive of lymphangitic spread.    Small bilateral pleural effusions.    Partially imaged left adrenal mass measuring 4.3 x 3.2 cm consistent with   metastatic disease.    --- End of Report ---    < end of copied text >

## 2022-03-10 LAB
ALBUMIN SERPL ELPH-MCNC: 3 G/DL — LOW (ref 3.3–5)
ALP SERPL-CCNC: 71 U/L — SIGNIFICANT CHANGE UP (ref 40–120)
ALT FLD-CCNC: 19 U/L — SIGNIFICANT CHANGE UP (ref 10–45)
ANION GAP SERPL CALC-SCNC: 11 MMOL/L — SIGNIFICANT CHANGE UP (ref 5–17)
AST SERPL-CCNC: 21 U/L — SIGNIFICANT CHANGE UP (ref 10–40)
BASOPHILS # BLD AUTO: 0.01 K/UL — SIGNIFICANT CHANGE UP (ref 0–0.2)
BASOPHILS NFR BLD AUTO: 0.2 % — SIGNIFICANT CHANGE UP (ref 0–2)
BILIRUB SERPL-MCNC: 0.4 MG/DL — SIGNIFICANT CHANGE UP (ref 0.2–1.2)
BUN SERPL-MCNC: 16 MG/DL — SIGNIFICANT CHANGE UP (ref 7–23)
CALCIUM SERPL-MCNC: 8.9 MG/DL — SIGNIFICANT CHANGE UP (ref 8.4–10.5)
CHLORIDE SERPL-SCNC: 102 MMOL/L — SIGNIFICANT CHANGE UP (ref 96–108)
CO2 SERPL-SCNC: 25 MMOL/L — SIGNIFICANT CHANGE UP (ref 22–31)
CREAT SERPL-MCNC: 0.82 MG/DL — SIGNIFICANT CHANGE UP (ref 0.5–1.3)
EGFR: 101 ML/MIN/1.73M2 — SIGNIFICANT CHANGE UP
EOSINOPHIL # BLD AUTO: 0 K/UL — SIGNIFICANT CHANGE UP (ref 0–0.5)
EOSINOPHIL NFR BLD AUTO: 0 % — SIGNIFICANT CHANGE UP (ref 0–6)
GLUCOSE SERPL-MCNC: 109 MG/DL — HIGH (ref 70–99)
HCT VFR BLD CALC: 24.8 % — LOW (ref 39–50)
HGB BLD-MCNC: 7.8 G/DL — LOW (ref 13–17)
IMM GRANULOCYTES NFR BLD AUTO: 1 % — SIGNIFICANT CHANGE UP (ref 0–1.5)
LYMPHOCYTES # BLD AUTO: 0.55 K/UL — LOW (ref 1–3.3)
LYMPHOCYTES # BLD AUTO: 8.8 % — LOW (ref 13–44)
MAGNESIUM SERPL-MCNC: 2.2 MG/DL — SIGNIFICANT CHANGE UP (ref 1.6–2.6)
MCHC RBC-ENTMCNC: 25.3 PG — LOW (ref 27–34)
MCHC RBC-ENTMCNC: 31.5 GM/DL — LOW (ref 32–36)
MCV RBC AUTO: 80.5 FL — SIGNIFICANT CHANGE UP (ref 80–100)
MONOCYTES # BLD AUTO: 0.24 K/UL — SIGNIFICANT CHANGE UP (ref 0–0.9)
MONOCYTES NFR BLD AUTO: 3.8 % — SIGNIFICANT CHANGE UP (ref 2–14)
NEUTROPHILS # BLD AUTO: 5.42 K/UL — SIGNIFICANT CHANGE UP (ref 1.8–7.4)
NEUTROPHILS NFR BLD AUTO: 86.2 % — HIGH (ref 43–77)
NRBC # BLD: 0 /100 WBCS — SIGNIFICANT CHANGE UP (ref 0–0)
PLATELET # BLD AUTO: 389 K/UL — SIGNIFICANT CHANGE UP (ref 150–400)
POTASSIUM SERPL-MCNC: 4.4 MMOL/L — SIGNIFICANT CHANGE UP (ref 3.5–5.3)
POTASSIUM SERPL-SCNC: 4.4 MMOL/L — SIGNIFICANT CHANGE UP (ref 3.5–5.3)
PROT SERPL-MCNC: 5.7 G/DL — LOW (ref 6–8.3)
RBC # BLD: 3.08 M/UL — LOW (ref 4.2–5.8)
RBC # FLD: 30.3 % — HIGH (ref 10.3–14.5)
SODIUM SERPL-SCNC: 138 MMOL/L — SIGNIFICANT CHANGE UP (ref 135–145)
WBC # BLD: 6.28 K/UL — SIGNIFICANT CHANGE UP (ref 3.8–10.5)
WBC # FLD AUTO: 6.28 K/UL — SIGNIFICANT CHANGE UP (ref 3.8–10.5)

## 2022-03-10 PROCEDURE — 70450 CT HEAD/BRAIN W/O DYE: CPT | Mod: 26

## 2022-03-10 PROCEDURE — 99232 SBSQ HOSP IP/OBS MODERATE 35: CPT

## 2022-03-10 PROCEDURE — 93970 EXTREMITY STUDY: CPT | Mod: 26

## 2022-03-10 RX ORDER — CHLORHEXIDINE GLUCONATE 213 G/1000ML
1 SOLUTION TOPICAL DAILY
Refills: 0 | Status: DISCONTINUED | OUTPATIENT
Start: 2022-03-10 | End: 2022-03-14

## 2022-03-10 RX ADMIN — CHLORHEXIDINE GLUCONATE 1 APPLICATION(S): 213 SOLUTION TOPICAL at 13:30

## 2022-03-10 RX ADMIN — REMDESIVIR 500 MILLIGRAM(S): 5 INJECTION INTRAVENOUS at 17:51

## 2022-03-10 RX ADMIN — ENOXAPARIN SODIUM 40 MILLIGRAM(S): 100 INJECTION SUBCUTANEOUS at 05:52

## 2022-03-10 RX ADMIN — PANTOPRAZOLE SODIUM 40 MILLIGRAM(S): 20 TABLET, DELAYED RELEASE ORAL at 05:52

## 2022-03-10 RX ADMIN — Medication 20 MILLIGRAM(S): at 05:52

## 2022-03-10 RX ADMIN — Medication 0.5 MILLIGRAM(S): at 23:49

## 2022-03-10 RX ADMIN — LOSARTAN POTASSIUM 100 MILLIGRAM(S): 100 TABLET, FILM COATED ORAL at 05:52

## 2022-03-10 RX ADMIN — Medication 30 MILLIGRAM(S): at 13:29

## 2022-03-10 RX ADMIN — ENOXAPARIN SODIUM 40 MILLIGRAM(S): 100 INJECTION SUBCUTANEOUS at 17:51

## 2022-03-10 RX ADMIN — SENNA PLUS 2 TABLET(S): 8.6 TABLET ORAL at 21:13

## 2022-03-10 NOTE — PROGRESS NOTE ADULT - SUBJECTIVE AND OBJECTIVE BOX
Follow-up Pulm Progress Note    cough and SOB continues to improve  sats 100% RA     Medications:  MEDICATIONS  (STANDING):  chlorhexidine 2% Cloths 1 Application(s) Topical daily  enoxaparin Injectable 40 milliGRAM(s) SubCutaneous every 12 hours  losartan 100 milliGRAM(s) Oral daily  methylPREDNISolone sodium succinate Injectable 20 milliGRAM(s) IV Push three times a day  pantoprazole    Tablet 40 milliGRAM(s) Oral before breakfast  polyethylene glycol 3350 17 Gram(s) Oral daily  remdesivir  IVPB   IV Intermittent   remdesivir  IVPB 100 milliGRAM(s) IV Intermittent every 24 hours  senna 2 Tablet(s) Oral at bedtime    MEDICATIONS  (PRN):  acetaminophen     Tablet .. 650 milliGRAM(s) Oral every 4 hours PRN Temp greater or equal to 38.5C (101.3F)  ALBUTerol    90 MICROgram(s) HFA Inhaler 2 Puff(s) Inhalation every 4 hours PRN Shortness of Breath and/or Wheezing  benzonatate 200 milliGRAM(s) Oral three times a day PRN Cough  clonazePAM  Tablet 0.5 milliGRAM(s) Oral at bedtime PRN anxiety  hydrocodone/homatropine Syrup 5 milliLiter(s) Oral every 6 hours PRN Cough  ondansetron Injectable 4 milliGRAM(s) IV Push every 8 hours PRN Nausea and/or Vomiting  oxyCODONE    IR 5 milliGRAM(s) Oral every 4 hours PRN Severe Pain (7 - 10)          Vital Signs Last 24 Hrs  T(C): 36.5 (10 Mar 2022 11:18), Max: 36.6 (10 Mar 2022 05:37)  T(F): 97.7 (10 Mar 2022 11:18), Max: 97.9 (10 Mar 2022 05:37)  HR: 90 (10 Mar 2022 11:18) (87 - 92)  BP: 121/73 (10 Mar 2022 11:18) (110/74 - 121/73)  BP(mean): --  RR: 18 (10 Mar 2022 11:18) (18 - 18)  SpO2: 98% (10 Mar 2022 11:18) (96% - 98%)          03-09 @ 07:01  -  03-10 @ 07:00  --------------------------------------------------------  IN: 240 mL / OUT: 450 mL / NET: -210 mL          LABS:                        7.8    6.28  )-----------( 389      ( 10 Mar 2022 06:31 )             24.8     03-10    138  |  102  |  16  ----------------------------<  109<H>  4.4   |  25  |  0.82    Ca    8.9      10 Mar 2022 06:31  Mg     2.2     03-10    TPro  5.7<L>  /  Alb  3.0<L>  /  TBili  0.4  /  DBili  x   /  AST  21  /  ALT  19  /  AlkPhos  71  03-10          CAPILLARY BLOOD GLUCOSE              Serum Pro-Brain Natriuretic Peptide: 48 pg/mL (03-07-22 @ 15:27)                CULTURES:    Culture - Blood (collected 03-07-22 @ 18:32)  Source: .Blood Blood-Venous  Preliminary Report (03-08-22 @ 19:02):    No growth to date.    Culture - Blood (collected 03-07-22 @ 18:31)  Source: .Blood Blood-Venous  Preliminary Report (03-08-22 @ 19:02):    No growth to date.        Culture - Urine (collected 03-07-22 @ 22:12)  Source: Clean Catch Clean Catch (Midstream)  Final Report (03-08-22 @ 17:11):    <10,000 CFU/mL Normal Urogenital Yadira            Physical Examination:  PULM: Clear to auscultation bilaterally, no significant sputum production  CVS: S1, S2 heard    RADIOLOGY REVIEWED  CT chest: < from: CT Angio Chest PE Protocol w/ IV Cont (03.07.22 @ 16:19) >  Tubes/Lines: Right Mediport catheter seen with tip in SVC    Mediastinum and Heart: Aorta and pulmonary arteries are normal in size.   Thyroid gland is unremarkable. There is conglomerate lymphadenopathy in   the mediastinum including the prevascular, right left paratracheal, AP   window subcarinal and right hilar stations. The largest area of   lymphadenopathy is conglomerate lymphadenopathy in the right hilum   measuring approximately 3 x 3.9 cm. There is narrowing of the proximal   right and left mainstem bronchi from conglomerate lymphadenopathy. No   pericardial effusion.    Lungs, Pleura, and Airways: There is no pulmonary embolus. Bilateral   thickening of the peribronchial vascular interstitium along with areas of   septal thickening suggestive of lymphangitic spread.    Small bilateral pleural effusions noted.    Visualized Abdomen: IVC filter is noted in place. A partially imaged left   adrenal mass measures 4.3 x 3.2 cm.    Bones and soft tissues: Unremarkable.    IMPRESSION:    No pulmonary embolus.    Conglomerate lymphadenopathy in the mediastinal along with thickening of   the bronchovascular interstitium suggestive of lymphangitic spread.    Small bilateral pleural effusions.    Partially imaged left adrenal mass measuring 4.3 x 3.2 cm consistent with   metastatic disease.    --- End of Report ---    < end of copied text >

## 2022-03-10 NOTE — PROGRESS NOTE ADULT - ASSESSMENT
63yo M w/ PMHx of Stage IV gastric adenocarcinoma w/ mets to peritoneum, HTN, Hx of PE (s/p Lovenox & IVC filter) presents for cough/SOB, admitted with COVID PNA:    - No evidence of acute ischemia.  Pain was clearly related to coughing.  Hs trop negative.     - No evidence of meaningful volume overload, with only mild edema of his feet  - BP well controlled on losartan. Can continue  - Supp oxygen, steroids, remdesivir for COVID PNA  - Monitor and replete lytes  - TO follow closely while admitted

## 2022-03-10 NOTE — PROGRESS NOTE ADULT - SUBJECTIVE AND OBJECTIVE BOX
SUBJECTIVE / OVERNIGHT EVENTS:pt seen and examined, pt states that his breathing is better thna before  03-10-22     MEDICATIONS  (STANDING):  chlorhexidine 2% Cloths 1 Application(s) Topical daily  enoxaparin Injectable 40 milliGRAM(s) SubCutaneous every 12 hours  losartan 100 milliGRAM(s) Oral daily  pantoprazole    Tablet 40 milliGRAM(s) Oral before breakfast  polyethylene glycol 3350 17 Gram(s) Oral daily  predniSONE   Tablet 30 milliGRAM(s) Oral daily  remdesivir  IVPB   IV Intermittent   remdesivir  IVPB 100 milliGRAM(s) IV Intermittent every 24 hours  senna 2 Tablet(s) Oral at bedtime    MEDICATIONS  (PRN):  acetaminophen     Tablet .. 650 milliGRAM(s) Oral every 4 hours PRN Temp greater or equal to 38.5C (101.3F)  ALBUTerol    90 MICROgram(s) HFA Inhaler 2 Puff(s) Inhalation every 4 hours PRN Shortness of Breath and/or Wheezing  benzonatate 200 milliGRAM(s) Oral three times a day PRN Cough  clonazePAM  Tablet 0.5 milliGRAM(s) Oral at bedtime PRN anxiety  hydrocodone/homatropine Syrup 5 milliLiter(s) Oral every 6 hours PRN Cough  ondansetron Injectable 4 milliGRAM(s) IV Push every 8 hours PRN Nausea and/or Vomiting  oxyCODONE    IR 5 milliGRAM(s) Oral every 4 hours PRN Severe Pain (7 - 10)    Vital Signs Last 24 Hrs  T(C): 36.5 (03-10-22 @ 11:18), Max: 36.6 (03-10-22 @ 05:37)  T(F): 97.7 (03-10-22 @ 11:18), Max: 97.9 (03-10-22 @ 05:37)  HR: 90 (03-10-22 @ 11:18) (87 - 92)  BP: 121/73 (03-10-22 @ 11:18) (110/74 - 121/73)  BP(mean): --  RR: 18 (03-10-22 @ 11:18) (18 - 18)  SpO2: 98% (03-10-22 @ 11:18) (96% - 98%)          Constitutional: No fever, fatigue  Skin: No rash.  Eyes: No recent vision problems or eye pain.  ENT: No congestion, ear pain, or sore throat.  Cardiovascular: No chest pain or palpation.  Respiratory: No cough, shortness of breath, congestion, or wheezing.  Gastrointestinal: No abdominal pain, nausea, vomiting, or diarrhea.  Genitourinary: No dysuria.  Musculoskeletal: No joint swelling.  Neurologic: No headache.    PHYSICAL EXAM:  GENERAL: NAD  EYES: EOMI, PERRLA  NECK: Supple, No JVD  CHEST/LUNG: dec breath sounds at bases   HEART:  S1 , S2 +  ABDOMEN: soft , bs+  EXTREMITIES:  trace edema  NEUROLOGY:alert awake oriented    LABS:  03-10    138  |  102  |  16  ----------------------------<  109<H>  4.4   |  25  |  0.82    Ca    8.9      10 Mar 2022 06:31  Mg     2.2     10    TPro  5.7<L>  /  Alb  3.0<L>  /  TBili  0.4  /  DBili      /  AST  21  /  ALT  19  /  AlkPhos  71  03-10    Creatinine Trend: 0.82 <--, 0.80 <--, 0.78 <--, 0.80 <--                        7.8    6.28  )-----------( 389      ( 10 Mar 2022 06:31 )             24.8     Urine Studies:  Urinalysis Basic - ( 07 Mar 2022 19:26 )    Color: Colorless / Appearance: Clear / S.027 / pH:   Gluc:  / Ketone: Negative  / Bili: Negative / Urobili: Negative   Blood:  / Protein: Negative / Nitrite: Negative   Leuk Esterase: Negative / RBC: 1 /hpf / WBC 0 /HPF   Sq Epi:  / Non Sq Epi: 0 /hpf / Bacteria: Negative              LIVER FUNCTIONS - ( 10 Mar 2022 06:31 )  Alb: 3.0 g/dL / Pro: 5.7 g/dL / ALK PHOS: 71 U/L / ALT: 19 U/L / AST: 21 U/L / GGT: x               LIVER FUNCTIONS - ( 09 Mar 2022 06:37 )  Alb: 3.4 g/dL / Pro: 6.2 g/dL / ALK PHOS: 84 U/L / ALT: 17 U/L / AST: 20 U/L / GGT: x

## 2022-03-10 NOTE — PROVIDER CONTACT NOTE (OTHER) - ASSESSMENT
Pt a&ox4, VSS. Pt denies any numbness or tingling in hands or feet. Upon neurological assessment, no facial droop noted. Pupils 3mm, reactive to light. No drifts in arms or legs noted at this time. Pt denies pain or discomfort. Patient states left foot feels weaker than right foot. Left foot weaker than right foot against resistance

## 2022-03-10 NOTE — PROGRESS NOTE ADULT - PROBLEM SELECTOR PLAN 1
CTA chest with conglomerate lymphadenopathy in the mediastinal along with thickening of the bronchovascular interstitium suggestive of lymphangitic spread  -Pt normoxic but with persistent cough, SOB. Improving with Hycodan and steroids. Continue hycodan 5cc q6h (hold for sedation).  -D/c solumedrol, start prednisone 30mg PO qd. Will determine steroid taper prior to discharge.

## 2022-03-10 NOTE — PROGRESS NOTE ADULT - PROBLEM SELECTOR PLAN 2
-follows with [facility] following at [facility]  -s/p second line chemotherapy Paclitaxel - started on 3/4/2022  -c/w home pain regimen oxycodone IR 5mg q4h PRN  -bowel regimen   -[facility] onc f/u

## 2022-03-10 NOTE — PROGRESS NOTE ADULT - ASSESSMENT
63 y/o M with PMH of Stage IV gastric adenocarcinoma w/ mets to peritoneum (dx 11/2021), HTN, Hx of PE (s/p Lovenox & IVC filter - now off Lovenox d/t to recent melena). Presents with cough/SOB. He went to [facility] and imaging there showed worsening disease progression and patient was prescribed Augmentin and discharged home. Now with continued symptoms, worsening cough, low grade temp. Found to be COVID positive in ED, CTA chest negative for PE, lymphangitic spread.

## 2022-03-10 NOTE — PROGRESS NOTE ADULT - SUBJECTIVE AND OBJECTIVE BOX
CC: Patient is a 62y old  Male who presents with a chief complaint of Cough/SOB (10 Mar 2022 12:43)    ID following for COVID infection    Interval History/ROS: Patient remains on room air. Occasional cough. No fevers.    Rest of ROS negative.    Allergies  No Known Allergies    ANTIMICROBIALS:  remdesivir  IVPB    remdesivir  IVPB 100 every 24 hours    OTHER MEDS:  acetaminophen     Tablet .. 650 milliGRAM(s) Oral every 4 hours PRN  ALBUTerol    90 MICROgram(s) HFA Inhaler 2 Puff(s) Inhalation every 4 hours PRN  benzonatate 200 milliGRAM(s) Oral three times a day PRN  chlorhexidine 2% Cloths 1 Application(s) Topical daily  clonazePAM  Tablet 0.5 milliGRAM(s) Oral at bedtime PRN  enoxaparin Injectable 40 milliGRAM(s) SubCutaneous every 12 hours  hydrocodone/homatropine Syrup 5 milliLiter(s) Oral every 6 hours PRN  losartan 100 milliGRAM(s) Oral daily  ondansetron Injectable 4 milliGRAM(s) IV Push every 8 hours PRN  oxyCODONE    IR 5 milliGRAM(s) Oral every 4 hours PRN  pantoprazole    Tablet 40 milliGRAM(s) Oral before breakfast  polyethylene glycol 3350 17 Gram(s) Oral daily  predniSONE   Tablet 30 milliGRAM(s) Oral daily  senna 2 Tablet(s) Oral at bedtime    PE:    Vital Signs Last 24 Hrs  T(C): 36.5 (10 Mar 2022 11:18), Max: 36.6 (10 Mar 2022 05:37)  T(F): 97.7 (10 Mar 2022 11:18), Max: 97.9 (10 Mar 2022 05:37)  HR: 90 (10 Mar 2022 11:18) (87 - 92)  BP: 121/73 (10 Mar 2022 11:18) (110/74 - 121/73)  BP(mean): --  RR: 18 (10 Mar 2022 11:18) (18 - 18)  SpO2: 98% (10 Mar 2022 11:18) (96% - 98%)    Gen: AOx3, NAD  CV: S1+S2 normal, no murmurs  Resp: Clear bilat, no resp distress  Abd: Soft, nontender, +BS  Ext: No LE edema, no wounds  : No Daly  IV/Skin: No thrombophlebitis, R chest mediport intact  Neuro: no focal deficits    LABS:                          7.8    6.28  )-----------( 389      ( 10 Mar 2022 06:31 )             24.8       03-10    138  |  102  |  16  ----------------------------<  109<H>  4.4   |  25  |  0.82    Ca    8.9      10 Mar 2022 06:31  Mg     2.2     03-10    TPro  5.7<L>  /  Alb  3.0<L>  /  TBili  0.4  /  DBili  x   /  AST  21  /  ALT  19  /  AlkPhos  71  03-10    MICROBIOLOGY:  v  Clean Catch Clean Catch (Midstream)  03-07-22   <10,000 CFU/mL Normal Urogenital Yadira  --  --      .Blood Blood-Venous  03-07-22   No growth to date.  --  --      .Blood Blood-Venous  03-07-22   No growth to date.  --  --    RADIOLOGY:  < from: [facility] Duplex Lower Ext Vein Scan, Brien (03.10.22 @ 13:47) >  IMPRESSION:  No evidence of deep venous thrombosis in the visualized segments of   either lower extremity. Calf veins were not imaged    < end of copied text >

## 2022-03-10 NOTE — PROGRESS NOTE ADULT - PROBLEM SELECTOR PLAN 2
+COVID PCR  -Fully vaccinated  -Remdesivir x 3-5 days (monitor creatinine, LFTs)  -Normoxic  -DVT ppx  -Trend inflammatory markers   -Pt with IVC filter in place, CTA chest neg PE.   -Ddimer elevated, f/u LE duplex

## 2022-03-10 NOTE — PROGRESS NOTE ADULT - PROBLEM SELECTOR PLAN 1
-currently not hypoxic on room air  -c/w tessalon 200mg q8h PRN  -continuous pulse ox monitoring  -Albuterol PRN  -obtain D-dimer (although likely to be elevated in setting of malignancy)  -tylenol PRN fever   -  id f/u    elevated d dimer with neg pE on cta - cont lovenox -

## 2022-03-10 NOTE — PROGRESS NOTE ADULT - SUBJECTIVE AND OBJECTIVE BOX
Patient is a 62y old  Male who presents with a chief complaint of Cough/SOB (10 Mar 2022 09:48)    Patient seen this morning. Still coughing but O2 saturation is normal on room air and he is afebrile. Abdominal pain is controlled on current meds and he has no GI bleeding.    MEDICATIONS  (STANDING):  chlorhexidine 2% Cloths 1 Application(s) Topical daily  enoxaparin Injectable 40 milliGRAM(s) SubCutaneous every 12 hours  losartan 100 milliGRAM(s) Oral daily  methylPREDNISolone sodium succinate Injectable 20 milliGRAM(s) IV Push three times a day  pantoprazole    Tablet 40 milliGRAM(s) Oral before breakfast  polyethylene glycol 3350 17 Gram(s) Oral daily  remdesivir  IVPB   IV Intermittent   remdesivir  IVPB 100 milliGRAM(s) IV Intermittent every 24 hours  senna 2 Tablet(s) Oral at bedtime    MEDICATIONS  (PRN):  acetaminophen     Tablet .. 650 milliGRAM(s) Oral every 4 hours PRN Temp greater or equal to 38.5C (101.3F)  ALBUTerol    90 MICROgram(s) HFA Inhaler 2 Puff(s) Inhalation every 4 hours PRN Shortness of Breath and/or Wheezing  benzonatate 200 milliGRAM(s) Oral three times a day PRN Cough  clonazePAM  Tablet 0.5 milliGRAM(s) Oral at bedtime PRN anxiety  hydrocodone/homatropine Syrup 5 milliLiter(s) Oral every 6 hours PRN Cough  ondansetron Injectable 4 milliGRAM(s) IV Push every 8 hours PRN Nausea and/or Vomiting  oxyCODONE    IR 5 milliGRAM(s) Oral every 4 hours PRN Severe Pain (7 - 10)      ROS  No fever, sweats, chills  No epistaxis, HA, sore throat  Cough perHPI  Abdominal pain controlled. No GI bleeding  No edema  No rash  No anxiety  No back pain, joint pain  No bleeding, bruising  No dysuria, hematuria    Vital Signs Last 24 Hrs  T(C): 36.5 (10 Mar 2022 11:18), Max: 36.7 (09 Mar 2022 12:01)  T(F): 97.7 (10 Mar 2022 11:18), Max: 98.1 (09 Mar 2022 12:01)  HR: 90 (10 Mar 2022 11:18) (87 - 97)  BP: 121/73 (10 Mar 2022 11:18) (99/62 - 121/73)  BP(mean): --  RR: 18 (10 Mar 2022 11:18) (18 - 18)  SpO2: 98% (10 Mar 2022 11:18) (96% - 99%)    PE  NAD  Awake, alert  Anicteric, MMM  No c/c/e  No rash grossly                            7.8    6.28  )-----------( 389      ( 10 Mar 2022 06:31 )             24.8       03-10    138  |  102  |  16  ----------------------------<  109<H>  4.4   |  25  |  0.82    Ca    8.9      10 Mar 2022 06:31  Mg     2.2     03-10    TPro  5.7<L>  /  Alb  3.0<L>  /  TBili  0.4  /  DBili  x   /  AST  21  /  ALT  19  /  AlkPhos  71  03-10

## 2022-03-10 NOTE — PROGRESS NOTE ADULT - ASSESSMENT
Patient is a 62 year old male under care [place] [facility] [place]. Brad Rojas with stage IV gastric adenocarcinoma (pMMR, HER-2 0+, CPS <1) with mets to the peritoneum. First cycle of chemotherapy was FOLFOX/Nivolumab - patient initially had response but was admitted to [facility] for SOB, cough and found to have progression of disease. CT chest/abdomen/pelvis 3/2/2022 showed significant progression of diease and lymphangitic spread. Patient is also reporting dark bowel movements, strongly concerning for melena.    Patient received one dose of paclitaxel 80 mg/m2 on 3/4/2022. RT consult was placed but he has not yet seen Dr. Forrest of [facility]. He is now at [facility] with ongoing SOB, cough and dark stools.    Stage IV Gastric Cancer  --Under Care by Dr. Brad Rojas at [facility]  --S/P second line of chemotherapy - Paclitaxel 80 mg/m2 - started on 3/4/2022  --Next chemo due 3/11 - will have to postpone until after discharge  --Patient was also being evaluated for palliative RT  --RT consulted for palliative RT as requested by Dr. Rojas (pain and bleeding) - they are requesting GI eval with EGD first  --Palliative consult appreciated for symptom management with persistent cough.    Anemia  --Most likely multifactorial  --Anemia of chronic disease/chemotherapy  --FOBT negative  --Iron work up shows elevated Ferritin (most likely acute phase reactant),   --Iron studies more consistent with anemia of chronic disease    COVID-19 Positive, Shortness of Breath, Fever  --Lymphangitic spread on CT  --On 2L O2 via NC   --CTA negative for pulmonary embolism, Conglomerate lymphadenopathy in the mediastinal along with thickening of the bronchovascular interstitium suggestive of lymphangitic spread. Small bilateral pleural effusions. Partially imaged left adrenal mass measuring 4.3 x 3.2 cm consistent with metastatic disease  --Pulmonary input appreciated  --Started on Methylprednisolone 20 mg TID    COVID-19 +  --On Day 3/5 Remdesivir and   --Clinically improving  --Pulmonary/ID following    We will continue to follow patient and coordinate with [facility].    Alejandro Ureña PA-C  Hematology/Oncology  [facility]   [phone] (office)  [phone] (alt office)  Evenings and weekends please call MD on call or office

## 2022-03-10 NOTE — PROGRESS NOTE ADULT - ASSESSMENT
62 year old male with Stage 4 Gastric Adenocarcinoma with mets to the peritoneum, HTN, PE s/p Lovenox and IVC filter, presenting with worsening sob and cough. On FOLFOX/ Nivolumab, was recently seen at [facility] and prescribed augmentin, but with worsening so came to the ED. In the ED with fever 101.2F, WBC WNL, not neutropenic, remains on room air, though with persistent cough. Received the Pfizer vaccine, most recent booster in Nov 2021. CTA chest with no PE, but with lymphadeopathy in the mediastinal along with thickening of the bronchovascular interstitium suggestive of lymphangitic spread. R chest mediport intact.    Recommend:  #COVID infection without hypoxia  -Continue supporitve care  -Given admitted for COVID symptoms, not eligible for Sotrovimab  -Given immunosuppressed, continue Remdesivir today day #3  -Monitor inflammatory markers  -Trend D-dimer (elevated though CT chest neg for PE. Also with IVC filter in place)  -F/U blood cultures, would monitor off abx at this time    #Fevers  -Suspect from COVID  -Continue to monitor fever curve    #Stage 4 adenocarcinoma with lymphangitic spread  -Dyspnea improving - on prednisone - appreciate pulm following  -Appreciate Onc following    Michel Restrepo MD  Available through MS Teams  If no response, or after 5pm/weekends, call [phone]

## 2022-03-10 NOTE — PROGRESS NOTE ADULT - SUBJECTIVE AND OBJECTIVE BOX
[facility]    Kanchan Meehan, David, Brenda, Ana Maria, Michael, Clif      [phone]    CHIEF COMPLAINT: Patient is a 62y old  Male who presents with a chief complaint of Cough/SOB (09 Mar 2022 12:59)      Follow Up: met gastric ca    Interim history: The patient reports no new symptoms.  Denies chest discomfort and shortness of breath.  No abdominal pain.  No new neurologic symptoms.      MEDICATIONS  (STANDING):  chlorhexidine 2% Cloths 1 Application(s) Topical daily  enoxaparin Injectable 40 milliGRAM(s) SubCutaneous every 12 hours  losartan 100 milliGRAM(s) Oral daily  methylPREDNISolone sodium succinate Injectable 20 milliGRAM(s) IV Push three times a day  pantoprazole    Tablet 40 milliGRAM(s) Oral before breakfast  polyethylene glycol 3350 17 Gram(s) Oral daily  remdesivir  IVPB   IV Intermittent   remdesivir  IVPB 100 milliGRAM(s) IV Intermittent every 24 hours  senna 2 Tablet(s) Oral at bedtime    MEDICATIONS  (PRN):  acetaminophen     Tablet .. 650 milliGRAM(s) Oral every 4 hours PRN Temp greater or equal to 38.5C (101.3F)  ALBUTerol    90 MICROgram(s) HFA Inhaler 2 Puff(s) Inhalation every 4 hours PRN Shortness of Breath and/or Wheezing  benzonatate 200 milliGRAM(s) Oral three times a day PRN Cough  clonazePAM  Tablet 0.5 milliGRAM(s) Oral at bedtime PRN anxiety  hydrocodone/homatropine Syrup 5 milliLiter(s) Oral every 6 hours PRN Cough  ondansetron Injectable 4 milliGRAM(s) IV Push every 8 hours PRN Nausea and/or Vomiting  oxyCODONE    IR 5 milliGRAM(s) Oral every 4 hours PRN Severe Pain (7 - 10)      REVIEW OF SYSTEMS:  eye, ent, GI, , allergic, dermatologic, musculoskeletal and neurologic are negative except as described above    Vital Signs Last 24 Hrs  T(C): 36.6 (10 Mar 2022 05:37), Max: 36.7 (09 Mar 2022 12:01)  T(F): 97.9 (10 Mar 2022 05:37), Max: 98.1 (09 Mar 2022 12:01)  HR: 87 (10 Mar 2022 05:37) (87 - 97)  BP: 110/74 (10 Mar 2022 05:37) (99/62 - 113/75)  BP(mean): --  RR: 18 (10 Mar 2022 05:37) (18 - 18)  SpO2: 96% (10 Mar 2022 05:37) (96% - 99%)    I&O's Summary    09 Mar 2022 07:01  -  10 Mar 2022 07:00  --------------------------------------------------------  IN: 240 mL / OUT: 450 mL / NET: -210 mL    10 Mar 2022 07:01  -  10 Mar 2022 09:49  --------------------------------------------------------  IN: 240 mL / OUT: 0 mL / NET: 240 mL        Telemetry past 24h:    PHYSICAL EXAM:    Constitutional: well-nourished, well-developed, NAD   HEENT:  MMM, sclerae anicteric, conjunctivae clear, no oral cyanosis.  Pulmonary: Non-labored, breath sounds are clear bilaterally, No wheezing, rales or rhonchi  Cardiovascular: Regular, S1 and S2.  No murmur.  No rubs, gallops or clicks  Gastrointestinal: Bowel Sounds present, soft, nontender.   Lymph: mild peripheral edema.   Neurological: Alert, no focal deficits  Skin: No rashes.  Psych:  Mood & affect appropriate    LABS: All Labs Reviewed:                        7.8    6.28  )-----------( 389      ( 10 Mar 2022 06:31 )             24.8                         8.7    4.96  )-----------( 379      ( 09 Mar 2022 06:41 )             27.6                         8.2    6.03  )-----------( 315      ( 08 Mar 2022 06:59 )             26.0     10 Mar 2022 06:31    138    |  102    |  16     ----------------------------<  109    4.4     |  25     |  0.82   09 Mar 2022 06:37    135    |  99     |  12     ----------------------------<  149    4.9     |  25     |  0.80   08 Mar 2022 06:59    136    |  100    |  8      ----------------------------<  98     4.5     |  24     |  0.78     Ca    8.9        10 Mar 2022 06:31  Ca    8.9        09 Mar 2022 06:37  Ca    8.5        08 Mar 2022 06:59  Phos  3.9       08 Mar 2022 06:59  Mg     2.2       10 Mar 2022 06:31  Mg     2.5       09 Mar 2022 06:37  Mg     2.2       08 Mar 2022 06:59    TPro  5.7    /  Alb  3.0    /  TBili  0.4    /  DBili  x      /  AST  21     /  ALT  19     /  AlkPhos  71     10 Mar 2022 06:31  TPro  6.2    /  Alb  3.4    /  TBili  0.4    /  DBili  x      /  AST  20     /  ALT  17     /  AlkPhos  84     09 Mar 2022 06:37  TPro  6.2    /  Alb  3.2    /  TBili  0.6    /  DBili  x      /  AST  25     /  ALT  20     /  AlkPhos  79     08 Mar 2022 06:59          Blood Culture: Organism --  Gram Stain Blood -- Gram Stain --  Specimen Source Clean Catch Clean Catch (Midstream)  Culture-Blood --    Organism --  Gram Stain Blood -- Gram Stain --  Specimen Source .Blood Blood-Venous  Culture-Blood --    Organism --  Gram Stain Blood -- Gram Stain --  Specimen Source .Blood Blood-Venous  Culture-Blood --      03-07 @ 15:27  Pro Bnp 48        RADIOLOGY:    EKG:    Echo:

## 2022-03-11 LAB
ANION GAP SERPL CALC-SCNC: 9 MMOL/L — SIGNIFICANT CHANGE UP (ref 5–17)
BUN SERPL-MCNC: 17 MG/DL — SIGNIFICANT CHANGE UP (ref 7–23)
CALCIUM SERPL-MCNC: 8.6 MG/DL — SIGNIFICANT CHANGE UP (ref 8.4–10.5)
CHLORIDE SERPL-SCNC: 102 MMOL/L — SIGNIFICANT CHANGE UP (ref 96–108)
CO2 SERPL-SCNC: 25 MMOL/L — SIGNIFICANT CHANGE UP (ref 22–31)
CREAT SERPL-MCNC: 0.87 MG/DL — SIGNIFICANT CHANGE UP (ref 0.5–1.3)
D DIMER BLD IA.RAPID-MCNC: 1100 NG/ML DDU — HIGH
EGFR: 99 ML/MIN/1.73M2 — SIGNIFICANT CHANGE UP
FERRITIN SERPL-MCNC: 423 NG/ML — HIGH (ref 30–400)
GLUCOSE SERPL-MCNC: 80 MG/DL — SIGNIFICANT CHANGE UP (ref 70–99)
HCT VFR BLD CALC: 25.3 % — LOW (ref 39–50)
HGB BLD-MCNC: 7.9 G/DL — LOW (ref 13–17)
MCHC RBC-ENTMCNC: 25.7 PG — LOW (ref 27–34)
MCHC RBC-ENTMCNC: 31.2 GM/DL — LOW (ref 32–36)
MCV RBC AUTO: 82.4 FL — SIGNIFICANT CHANGE UP (ref 80–100)
NRBC # BLD: 0 /100 WBCS — SIGNIFICANT CHANGE UP (ref 0–0)
PLATELET # BLD AUTO: 404 K/UL — HIGH (ref 150–400)
POTASSIUM SERPL-MCNC: 3.9 MMOL/L — SIGNIFICANT CHANGE UP (ref 3.5–5.3)
POTASSIUM SERPL-SCNC: 3.9 MMOL/L — SIGNIFICANT CHANGE UP (ref 3.5–5.3)
RBC # BLD: 3.07 M/UL — LOW (ref 4.2–5.8)
RBC # FLD: 30.5 % — HIGH (ref 10.3–14.5)
SODIUM SERPL-SCNC: 136 MMOL/L — SIGNIFICANT CHANGE UP (ref 135–145)
WBC # BLD: 7.71 K/UL — SIGNIFICANT CHANGE UP (ref 3.8–10.5)
WBC # FLD AUTO: 7.71 K/UL — SIGNIFICANT CHANGE UP (ref 3.8–10.5)

## 2022-03-11 PROCEDURE — 99232 SBSQ HOSP IP/OBS MODERATE 35: CPT

## 2022-03-11 PROCEDURE — 70553 MRI BRAIN STEM W/O & W/DYE: CPT | Mod: 26

## 2022-03-11 PROCEDURE — 72158 MRI LUMBAR SPINE W/O & W/DYE: CPT | Mod: 26

## 2022-03-11 RX ADMIN — Medication 0.5 MILLIGRAM(S): at 23:41

## 2022-03-11 RX ADMIN — LOSARTAN POTASSIUM 100 MILLIGRAM(S): 100 TABLET, FILM COATED ORAL at 05:22

## 2022-03-11 RX ADMIN — OXYCODONE HYDROCHLORIDE 5 MILLIGRAM(S): 5 TABLET ORAL at 18:20

## 2022-03-11 RX ADMIN — Medication 30 MILLIGRAM(S): at 05:22

## 2022-03-11 RX ADMIN — ENOXAPARIN SODIUM 40 MILLIGRAM(S): 100 INJECTION SUBCUTANEOUS at 05:21

## 2022-03-11 RX ADMIN — CHLORHEXIDINE GLUCONATE 1 APPLICATION(S): 213 SOLUTION TOPICAL at 11:44

## 2022-03-11 RX ADMIN — PANTOPRAZOLE SODIUM 40 MILLIGRAM(S): 20 TABLET, DELAYED RELEASE ORAL at 05:21

## 2022-03-11 RX ADMIN — SENNA PLUS 2 TABLET(S): 8.6 TABLET ORAL at 22:59

## 2022-03-11 RX ADMIN — OXYCODONE HYDROCHLORIDE 5 MILLIGRAM(S): 5 TABLET ORAL at 17:50

## 2022-03-11 RX ADMIN — REMDESIVIR 500 MILLIGRAM(S): 5 INJECTION INTRAVENOUS at 17:43

## 2022-03-11 RX ADMIN — ENOXAPARIN SODIUM 40 MILLIGRAM(S): 100 INJECTION SUBCUTANEOUS at 17:43

## 2022-03-11 NOTE — PROGRESS NOTE ADULT - ASSESSMENT
62 year old male with Stage 4 Gastric Adenocarcinoma with mets to the peritoneum, HTN, PE s/p Lovenox and IVC filter, presenting with worsening sob and cough. On FOLFOX/ Nivolumab, was recently seen at [facility] and prescribed augmentin, but with worsening so came to the ED. In the ED with fever 101.2F, WBC WNL, not neutropenic, remains on room air, though with persistent cough. Received the Pfizer vaccine, most recent booster in Nov 2021. CTA chest with no PE, but with lymphadeopathy in the mediastinal along with thickening of the bronchovascular interstitium suggestive of lymphangitic spread. R chest mediport intact. L foot drop.    Recommend:  #COVID infection without hypoxia  -Continue supporitve care  -Given admitted for COVID symptoms, not eligible for Sotrovimab  -Given immunosuppressed, continue Remdesivir   -Monitor inflammatory markers  -Continue to monitor off abx at this time    #Fevers  -Suspect from COVID  -Continue to monitor fever curve    #Stage 4 adenocarcinoma with lymphangitic spread  -Dyspnea improving - on prednisone - appreciate pulm following  -Appreciate Onc following  -Given will be on a prolonged course of steroids, bactrim started for PCP ppx    #L foot drop  -F/U MRI L spine and MRI head    Michel Restrepo MD  Available through MS Teams  If no response, or after 5pm/weekends, call [phone]

## 2022-03-11 NOTE — PROGRESS NOTE ADULT - SUBJECTIVE AND OBJECTIVE BOX
Follow-up Pulm Progress Note    still with cough but much better  Sats 98% RA    Medications:  MEDICATIONS  (STANDING):  chlorhexidine 2% Cloths 1 Application(s) Topical daily  enoxaparin Injectable 40 milliGRAM(s) SubCutaneous every 12 hours  losartan 100 milliGRAM(s) Oral daily  pantoprazole    Tablet 40 milliGRAM(s) Oral before breakfast  polyethylene glycol 3350 17 Gram(s) Oral daily  predniSONE   Tablet 30 milliGRAM(s) Oral daily  remdesivir  IVPB   IV Intermittent   remdesivir  IVPB 100 milliGRAM(s) IV Intermittent every 24 hours  senna 2 Tablet(s) Oral at bedtime    MEDICATIONS  (PRN):  acetaminophen     Tablet .. 650 milliGRAM(s) Oral every 4 hours PRN Temp greater or equal to 38.5C (101.3F)  ALBUTerol    90 MICROgram(s) HFA Inhaler 2 Puff(s) Inhalation every 4 hours PRN Shortness of Breath and/or Wheezing  benzonatate 200 milliGRAM(s) Oral three times a day PRN Cough  clonazePAM  Tablet 0.5 milliGRAM(s) Oral at bedtime PRN anxiety  hydrocodone/homatropine Syrup 5 milliLiter(s) Oral every 6 hours PRN Cough  ondansetron Injectable 4 milliGRAM(s) IV Push every 8 hours PRN Nausea and/or Vomiting  oxyCODONE    IR 5 milliGRAM(s) Oral every 4 hours PRN Severe Pain (7 - 10)          Vital Signs Last 24 Hrs  T(C): 36.5 (11 Mar 2022 11:20), Max: 36.7 (11 Mar 2022 04:37)  T(F): 97.7 (11 Mar 2022 11:20), Max: 98.1 (11 Mar 2022 04:37)  HR: 97 (11 Mar 2022 11:20) (73 - 97)  BP: 118/77 (11 Mar 2022 11:20) (100/66 - 124/79)  BP(mean): --  RR: 18 (11 Mar 2022 11:20) (18 - 18)  SpO2: 98% (11 Mar 2022 11:20) (94% - 98%)          03-10 @ 07:01  -  03-11 @ 07:00  --------------------------------------------------------  IN: 1020 mL / OUT: 300 mL / NET: 720 mL          LABS:                        7.9    7.71  )-----------( 404      ( 11 Mar 2022 07:03 )             25.3     03-11    136  |  102  |  17  ----------------------------<  80  3.9   |  25  |  0.87    Ca    8.6      11 Mar 2022 06:58  Mg     2.2     03-10    TPro  5.7<L>  /  Alb  3.0<L>  /  TBili  0.4  /  DBili  x   /  AST  21  /  ALT  19  /  AlkPhos  71  03-10            CULTURES:     Culture - Blood (collected 03-07-22 @ 18:32)  Source: .Blood Blood-Venous  Preliminary Report (03-08-22 @ 19:02):    No growth to date.    Culture - Blood (collected 03-07-22 @ 18:31)  Source: .Blood Blood-Venous  Preliminary Report (03-08-22 @ 19:02):    No growth to date.        Culture - Urine (collected 03-07-22 @ 22:12)  Source: Clean Catch Clean Catch (Midstream)  Final Report (03-08-22 @ 17:11):    <10,000 CFU/mL Normal Urogenital Yadira              Physical Examination:  PULM: Clear to auscultation bilaterally, no significant sputum production  CVS: S1, S2 heard    RADIOLOGY REVIEWED  CT chest: < from: CT Angio Chest PE Protocol w/ IV Cont (03.07.22 @ 16:19) >  Tubes/Lines: Right Mediport catheter seen with tip in SVC    Mediastinum and Heart: Aorta and pulmonary arteries are normal in size.   Thyroid gland is unremarkable. There is conglomerate lymphadenopathy in   the mediastinum including the prevascular, right left paratracheal, AP   window subcarinal and right hilar stations. The largest area of   lymphadenopathy is conglomerate lymphadenopathy in the right hilum   measuring approximately 3 x 3.9 cm. There is narrowing of the proximal   right and left mainstem bronchi from conglomerate lymphadenopathy. No   pericardial effusion.    Lungs, Pleura, and Airways: There is no pulmonary embolus. Bilateral   thickening of the peribronchial vascular interstitium along with areas of   septal thickening suggestive of lymphangitic spread.    Small bilateral pleural effusions noted.    Visualized Abdomen: IVC filter is noted in place. A partially imaged left   adrenal mass measures 4.3 x 3.2 cm.    Bones and soft tissues: Unremarkable.    IMPRESSION:    No pulmonary embolus.    Conglomerate lymphadenopathy in the mediastinal along with thickening of   the bronchovascular interstitium suggestive of lymphangitic spread.    Small bilateral pleural effusions.    Partially imaged left adrenal mass measuring 4.3 x 3.2 cm consistent with   metastatic disease.    --- End of Report ---    < end of copied text >

## 2022-03-11 NOTE — PROGRESS NOTE ADULT - SUBJECTIVE AND OBJECTIVE BOX
Patient is a 62y old  Male who presents with a chief complaint of Cough/SOB (11 Mar 2022 09:51)    Patient seen this morning. Afebrile and breathing well on room air. However, patient has developed weakness in left foot since last night with foot drop. No other neuro deficits' noted. CT head was negative.    MEDICATIONS  (STANDING):  chlorhexidine 2% Cloths 1 Application(s) Topical daily  enoxaparin Injectable 40 milliGRAM(s) SubCutaneous every 12 hours  losartan 100 milliGRAM(s) Oral daily  pantoprazole    Tablet 40 milliGRAM(s) Oral before breakfast  polyethylene glycol 3350 17 Gram(s) Oral daily  predniSONE   Tablet 30 milliGRAM(s) Oral daily  remdesivir  IVPB   IV Intermittent   remdesivir  IVPB 100 milliGRAM(s) IV Intermittent every 24 hours  senna 2 Tablet(s) Oral at bedtime    MEDICATIONS  (PRN):  acetaminophen     Tablet .. 650 milliGRAM(s) Oral every 4 hours PRN Temp greater or equal to 38.5C (101.3F)  ALBUTerol    90 MICROgram(s) HFA Inhaler 2 Puff(s) Inhalation every 4 hours PRN Shortness of Breath and/or Wheezing  benzonatate 200 milliGRAM(s) Oral three times a day PRN Cough  clonazePAM  Tablet 0.5 milliGRAM(s) Oral at bedtime PRN anxiety  hydrocodone/homatropine Syrup 5 milliLiter(s) Oral every 6 hours PRN Cough  ondansetron Injectable 4 milliGRAM(s) IV Push every 8 hours PRN Nausea and/or Vomiting  oxyCODONE    IR 5 milliGRAM(s) Oral every 4 hours PRN Severe Pain (7 - 10)      ROS  No fever, sweats, chills  No epistaxis, HA, sore throat  No CP, SOB, cough, sputum  No n/v/d, abd pain, melena, hematochezia  No edema  No rash  No anxiety  Weakness left foot  No bleeding, bruising  No dysuria, hematuria    Vital Signs Last 24 Hrs  T(C): 36.7 (11 Mar 2022 04:37), Max: 36.7 (11 Mar 2022 04:37)  T(F): 98.1 (11 Mar 2022 04:37), Max: 98.1 (11 Mar 2022 04:37)  HR: 73 (11 Mar 2022 05:18) (73 - 90)  BP: 110/71 (11 Mar 2022 05:18) (100/66 - 124/79)  BP(mean): --  RR: 18 (11 Mar 2022 04:37) (18 - 18)  SpO2: 96% (11 Mar 2022 04:37) (94% - 98%)    PE  NAD  Awake, alert  Anicteric, MMM  No c/c/e  No rash grossly  Decreased flexion and strength left foot                          7.9    7.71  )-----------( 404      ( 11 Mar 2022 07:03 )             25.3       03-11    136  |  102  |  17  ----------------------------<  80  3.9   |  25  |  0.87    Ca    8.6      11 Mar 2022 06:58  Mg     2.2     03-10    TPro  5.7<L>  /  Alb  3.0<L>  /  TBili  0.4  /  DBili  x   /  AST  21  /  ALT  19  /  AlkPhos  71  03-10      ACC: [id number] EXAM:  CT BRAIN                          PROCEDURE DATE:  03/10/2022          INTERPRETATION:  .    CLINICAL INFORMATION: Left-sided foot weakness. Stage IV gastric cancer.    TECHNIQUE: Multiple axial CT images of the head were obtained without   contrast. Sagittal and coronal reconstructed images were acquired from   the source data.    COMPARISON: No prior CT studies of the brain are available for comparison   at this institution.    FINDINGS: There is no acute intracranial hemorrhage, mass effect, shift   of the midline structures, herniation, extra-axial fluid collection, or   hydrocephalus.    There is diffuse cerebral volume loss with prominence of the sulci,   fissures, and cisternal spaces which is normal for the patient's age.   There is minimal deep and periventricular white matter hypoattenuation   statistically compatible with microvascular changes given calcific   atherosclerotic disease of the intracranial arteries.    Scattered mucosal thickening is seen throughout the paranasal sinuses,   and associated in the left sphenoid sinus. The calvarium is intact. The   imaged orbits are unremarkable.    IMPRESSION: No acute intracranial hemorrhage, mass effect, or shift of   the midline structures.    --- End of Report ---      ACC: [id number] EXAM:  DUPLEX SCAN EXT VEINS LOWER BI                          PROCEDURE DATE:  03/10/2022          INTERPRETATION:  CLINICAL INFORMATION: Elevated d-dimer. Covid positive.    COMPARISON: None available.    TECHNIQUE: Portable Duplex sonography of the BILATERAL LOWER extremity   veins with color and spectral Doppler, with and without compression.   Modified Covid protocol was performed.    FINDINGS:    RIGHT:  Normal compressibility of the RIGHT common femoral, femoral and popliteal   veins.  Doppler examination shows normal spontaneous and phasic flow.  Calf veins were not imaged..    LEFT:  Normal compressibility of the LEFT common femoral, femoral and popliteal   veins.  Doppler examination shows normal spontaneous and phasic flow.  Calf veins were not imaged.    IMPRESSION:  No evidence of deep venous thrombosis in the visualized segments of   either lower extremity. Calf veins were not imaged          --- End of Report ---

## 2022-03-11 NOTE — PROGRESS NOTE ADULT - SUBJECTIVE AND OBJECTIVE BOX
CC: Patient is a 62y old  Male who presents with a chief complaint of Cough/SOB (11 Mar 2022 12:11)    ID following for COVID infection    Interval History/ROS: Patient remains with cough. Has L foot drop pending MRI head and L spine. No fevers, no chills.    Rest of ROS negative.    Allergies  No Known Allergies    ANTIMICROBIALS:  remdesivir  IVPB    remdesivir  IVPB 100 every 24 hours  trimethoprim  160 mG/sulfamethoxazole 800 mG 1 <User Schedule>    OTHER MEDS:  acetaminophen     Tablet .. 650 milliGRAM(s) Oral every 4 hours PRN  ALBUTerol    90 MICROgram(s) HFA Inhaler 2 Puff(s) Inhalation every 4 hours PRN  benzonatate 200 milliGRAM(s) Oral three times a day PRN  chlorhexidine 2% Cloths 1 Application(s) Topical daily  clonazePAM  Tablet 0.5 milliGRAM(s) Oral at bedtime PRN  enoxaparin Injectable 40 milliGRAM(s) SubCutaneous every 12 hours  hydrocodone/homatropine Syrup 5 milliLiter(s) Oral every 6 hours PRN  losartan 100 milliGRAM(s) Oral daily  ondansetron Injectable 4 milliGRAM(s) IV Push every 8 hours PRN  oxyCODONE    IR 5 milliGRAM(s) Oral every 4 hours PRN  pantoprazole    Tablet 40 milliGRAM(s) Oral before breakfast  polyethylene glycol 3350 17 Gram(s) Oral daily  predniSONE   Tablet 30 milliGRAM(s) Oral daily  senna 2 Tablet(s) Oral at bedtime    PE:    Vital Signs Last 24 Hrs  T(C): 36.5 (11 Mar 2022 11:20), Max: 36.7 (11 Mar 2022 04:37)  T(F): 97.7 (11 Mar 2022 11:20), Max: 98.1 (11 Mar 2022 04:37)  HR: 97 (11 Mar 2022 11:20) (73 - 97)  BP: 118/77 (11 Mar 2022 11:20) (100/66 - 124/79)  BP(mean): --  RR: 18 (11 Mar 2022 11:20) (18 - 18)  SpO2: 98% (11 Mar 2022 11:20) (94% - 98%)    Gen: AOx3, NAD  CV: S1+S2 normal, no murmurs  Resp: Clear bilat, no resp distress  Abd: Soft, nontender, +BS  Ext: No LE edema, no wounds  : No Daly  IV/Skin: No thrombophlebitis, L foot drop  Neuro: no focal deficits    LABS:                          7.9    7.71  )-----------( 404      ( 11 Mar 2022 07:03 )             25.3       03-11    136  |  102  |  17  ----------------------------<  80  3.9   |  25  |  0.87    Ca    8.6      11 Mar 2022 06:58  Mg     2.2     03-10    TPro  5.7<L>  /  Alb  3.0<L>  /  TBili  0.4  /  DBili  x   /  AST  21  /  ALT  19  /  AlkPhos  71  03-10    MICROBIOLOGY:  v  Clean Catch Clean Catch (Midstream)  03-07-22   <10,000 CFU/mL Normal Urogenital Yadira  --  --      .Blood Blood-Venous  03-07-22   No growth to date.  --  --      .Blood Blood-Venous  03-07-22   No growth to date.  --  --    RADIOLOGY:    < from: CT Head No Cont (03.10.22 @ 23:42) >  IMPRESSION: No acute intracranial hemorrhage, mass effect, or shift of   the midline structures.    < end of copied text >

## 2022-03-11 NOTE — PROGRESS NOTE ADULT - ASSESSMENT
61yo M w/ PMHx of Stage IV gastric adenocarcinoma w/ mets to peritoneum, HTN, Hx of PE (s/p Lovenox & IVC filter) presents for cough/SOB, found to be anemic and COVID positive

## 2022-03-11 NOTE — CONSULT NOTE ADULT - SUBJECTIVE AND OBJECTIVE BOX
Neurology Consult    Reason for Consult: Patient is a 62y old  Male who presents with a chief complaint of Cough/SOB (11 Mar 2022 12:11)      HPI:  63yo M w/ PMHx of Stage IV gastric adenocarcinoma w/ mets to peritoneum, HTN, Hx of PE (s/p Lovenox & IVC filter) presents for cough/SOB, patient was initially Dx w/ gastric adenocarcinoma 11/2021 and received FOLFOX/Nivolumab, initially with response, however patient reports suffering cough/SOB with clear sputum that has progressively worsened over the past week, he went to [facility] and imaging there showed worsening disease progression and patient was prescribed Augment and discharged home, today he reports continued symptoms, he endorses a fever last night of 100.2 and had 1 episode of dark stool that resolved, he previously was on lovenox for a PE, had IVC filter placed and was previously taken off therapeutic lovenox due to anemia, he follows with Dr. Brad Rojas at [facility] for oncology, in the ED, he was hemodynamically stable, afebrile to 102, mildly tachypneic but saturating well on room air, labs were significant for borderline low microcytic anemia (unknown baseline) and COVID positive, CTA chest negative for PE, oncology was consulted in the ED, patient admitted to general medicine for further management.   (07 Mar 2022 21:53)       PAST MEDICAL & SURGICAL HISTORY:  Gastric adenocarcinoma  stage 4    Hypertension    S/P IVC filter    Anorectal abscess        Allergies: Allergies    No Known Allergies    Intolerances        Social History: Denies toxic habits including tobacco, ETOH or illicit drugs.    Family History: FAMILY HISTORY:  FHx: stroke    FH: colon cancer    . No family history of strokes    Medications: MEDICATIONS  (STANDING):  chlorhexidine 2% Cloths 1 Application(s) Topical daily  enoxaparin Injectable 40 milliGRAM(s) SubCutaneous every 12 hours  losartan 100 milliGRAM(s) Oral daily  pantoprazole    Tablet 40 milliGRAM(s) Oral before breakfast  polyethylene glycol 3350 17 Gram(s) Oral daily  predniSONE   Tablet 30 milliGRAM(s) Oral daily  remdesivir  IVPB   IV Intermittent   remdesivir  IVPB 100 milliGRAM(s) IV Intermittent every 24 hours  senna 2 Tablet(s) Oral at bedtime    MEDICATIONS  (PRN):  acetaminophen     Tablet .. 650 milliGRAM(s) Oral every 4 hours PRN Temp greater or equal to 38.5C (101.3F)  ALBUTerol    90 MICROgram(s) HFA Inhaler 2 Puff(s) Inhalation every 4 hours PRN Shortness of Breath and/or Wheezing  benzonatate 200 milliGRAM(s) Oral three times a day PRN Cough  clonazePAM  Tablet 0.5 milliGRAM(s) Oral at bedtime PRN anxiety  hydrocodone/homatropine Syrup 5 milliLiter(s) Oral every 6 hours PRN Cough  ondansetron Injectable 4 milliGRAM(s) IV Push every 8 hours PRN Nausea and/or Vomiting  oxyCODONE    IR 5 milliGRAM(s) Oral every 4 hours PRN Severe Pain (7 - 10)      Review of Systems:  CONSTITUTIONAL:  +weight loss, fever, chills, weakness  and fatigue.  HEENT:  Eyes:  No visual loss, blurred vision, double vision or yellow sclera. Ears, Nose, Throat:  No hearing loss, sneezing, congestion, runny nose or sore throat.  SKIN:  No rash or itching.  CARDIOVASCULAR:  No chest pain, chest pressure or chest discomfort. No palpitations or edema.  RESPIRATORY:  + shortness of breath, cough or sputum.  GASTROINTESTINAL:  No anorexia, nausea, vomiting or diarrhea. No abdominal pain or blood.  GENITOURINARY:  No burning on urination or incontinence   NEUROLOGICAL:  No headache, dizziness, syncope, paralysis, ataxia, numbness or tingling in the extremities. No change in bowel or bladder control. no limb weakness. no vision changes.   MUSCULOSKELETAL:  No muscle, back pain, joint pain or stiffness.  HEMATOLOGIC:  No anemia, bleeding or bruising.  LYMPHATICS:  No enlarged nodes. No history of splenectomy.  PSYCHIATRIC:  No history of depression or anxiety.  ENDOCRINOLOGIC:  No reports of sweating, cold or heat intolerance. No polyuria or polydipsia.      Vitals:  Vital Signs Last 24 Hrs  T(C): 36.5 (11 Mar 2022 11:20), Max: 36.7 (11 Mar 2022 04:37)  T(F): 97.7 (11 Mar 2022 11:20), Max: 98.1 (11 Mar 2022 04:37)  HR: 97 (11 Mar 2022 11:20) (73 - 97)  BP: 118/77 (11 Mar 2022 11:20) (100/66 - 124/79)  BP(mean): --  RR: 18 (11 Mar 2022 11:20) (18 - 18)  SpO2: 98% (11 Mar 2022 11:20) (94% - 98%)    General Exam:   General Appearance: Appropriately dressed and in no acute distress       Head: Normocephalic, atraumatic and no dysmorphic features  Ear, Nose, and Throat: Moist mucous membranes  CVS: S1S2+  Resp: No SOB, no wheeze or rhonchi  GI: soft NT/ND  Extremities: No edema or cyanosis  Skin: No bruises or rashes     Neurological Exam:  Mental Status: Awake, alert and oriented x 3.  Able to follow simple and complex verbal commands. Able to name and repeat. fluent speech. No obvious aphasia or dysarthria noted.   Cranial Nerves: PERRL, EOMI, VFFC, sensation V1-V3 intact,  no obvious facial asymmetry, equal elevation of palate, scm/trap 5/5, tongue is midline on protrusion. no obvious papilledema on fundoscopic exam. hearing is grossly intact.   Motor: Normal bulk, tone and strength throughout except L weakness in dorsiflexion 3/5 . Fine finger movements were intact and symmetric. no tremors or drift noted.    Sensation: Intact to light touch and pinprick throughout. no right/left confusion. no extinction to tactile on DSS. Romberg was negative.   Reflexes: 1+ throughout at biceps, brachioradialis, triceps, patellars and ankles bilaterally and equal. No clonus. R toe and L toe were both downgoing.  Coordination: No dysmetria on FNF   Gait: deferred      Data/Labs/Imaging which I personally reviewed.     Labs:     CBC Full  -  ( 11 Mar 2022 07:03 )  WBC Count : 7.71 K/uL  RBC Count : 3.07 M/uL  Hemoglobin : 7.9 g/dL  Hematocrit : 25.3 %  Platelet Count - Automated : 404 K/uL  Mean Cell Volume : 82.4 fl  Mean Cell Hemoglobin : 25.7 pg  Mean Cell Hemoglobin Concentration : 31.2 gm/dL  Auto Neutrophil # : x  Auto Lymphocyte # : x  Auto Monocyte # : x  Auto Eosinophil # : x  Auto Basophil # : x  Auto Neutrophil % : x  Auto Lymphocyte % : x  Auto Monocyte % : x  Auto Eosinophil % : x  Auto Basophil % : x    03-11    136  |  102  |  17  ----------------------------<  80  3.9   |  25  |  0.87    Ca    8.6      11 Mar 2022 06:58  Mg     2.2     03-10    TPro  5.7<L>  /  Alb  3.0<L>  /  TBili  0.4  /  DBili  x   /  AST  21  /  ALT  19  /  AlkPhos  71  03-10    LIVER FUNCTIONS - ( 10 Mar 2022 06:31 )  Alb: 3.0 g/dL / Pro: 5.7 g/dL / ALK PHOS: 71 U/L / ALT: 19 U/L / AST: 21 U/L / GGT: x             < from: CT Head No Cont (03.10.22 @ 23:42) >    < from: CT Head No Cont (03.10.22 @ 23:42) >    ACC: [id number] EXAM:  CT BRAIN                          PROCEDURE DATE:  03/10/2022          INTERPRETATION:  .    CLINICAL INFORMATION: Left-sided foot weakness. Stage IV gastric cancer.    TECHNIQUE: Multiple axial CT images of the head were obtained without   contrast. Sagittal and coronal reconstructed images were acquired from   the source data.    COMPARISON: No prior CT studies of the brain are available for comparison   at this institution.    FINDINGS: There is no acute intracranial hemorrhage, mass effect, shift   of the midline structures, herniation, extra-axial fluid collection, or   hydrocephalus.    There is diffuse cerebral volume loss with prominence of the sulci,   fissures, and cisternal spaces which is normal for the patient's age.   There is minimal deep and periventricular white matter hypoattenuation   statistically compatible with microvascular changes given calcific   atherosclerotic disease of the intracranial arteries.    Scattered mucosal thickening is seen throughout the paranasal sinuses,   and associated in the left sphenoid sinus. The calvarium is intact. The   imaged orbits are unremarkable.    IMPRESSION: No acute intracranial hemorrhage, mass effect, or shift of   the midline structures.    --- End of Report ---            ARNULFO SANCHEZ MD; Attending Radiologist  This document has been electronically signed. Mar 11 2022 10:12AM    < end of copied text >    < end of copied text >

## 2022-03-11 NOTE — CONSULT NOTE ADULT - CONSULT REQUESTED BY NAME
Dr[facility]va
[facility]
Dr. Villanueva

## 2022-03-11 NOTE — PROGRESS NOTE ADULT - SUBJECTIVE AND OBJECTIVE BOX
SUBJECTIVE / OVERNIGHT EVENTS:pt seen and examined, pt states that his breathing is better than before, c/io left foot weakness/ left foot drop  22     MEDICATIONS  (STANDING):  chlorhexidine 2% Cloths 1 Application(s) Topical daily  enoxaparin Injectable 40 milliGRAM(s) SubCutaneous every 12 hours  losartan 100 milliGRAM(s) Oral daily  pantoprazole    Tablet 40 milliGRAM(s) Oral before breakfast  polyethylene glycol 3350 17 Gram(s) Oral daily  predniSONE   Tablet 30 milliGRAM(s) Oral daily  remdesivir  IVPB   IV Intermittent   remdesivir  IVPB 100 milliGRAM(s) IV Intermittent every 24 hours  senna 2 Tablet(s) Oral at bedtime  trimethoprim  160 mG/sulfamethoxazole 800 mG 1 Tablet(s) Oral <User Schedule>    MEDICATIONS  (PRN):  acetaminophen     Tablet .. 650 milliGRAM(s) Oral every 4 hours PRN Temp greater or equal to 38.5C (101.3F)  ALBUTerol    90 MICROgram(s) HFA Inhaler 2 Puff(s) Inhalation every 4 hours PRN Shortness of Breath and/or Wheezing  benzonatate 200 milliGRAM(s) Oral three times a day PRN Cough  clonazePAM  Tablet 0.5 milliGRAM(s) Oral at bedtime PRN anxiety  hydrocodone/homatropine Syrup 5 milliLiter(s) Oral every 6 hours PRN Cough  ondansetron Injectable 4 milliGRAM(s) IV Push every 8 hours PRN Nausea and/or Vomiting  oxyCODONE    IR 5 milliGRAM(s) Oral every 4 hours PRN Severe Pain (7 - 10)    Vital Signs Last 24 Hrs  T(C): 36.5 (22 @ 11:20), Max: 36.7 (22 @ 04:37)  T(F): 97.7 (22 @ 11:20), Max: 98.1 (22 @ 04:37)  HR: 97 (22 @ 11:20) (73 - 97)  BP: 118/77 (22 @ 11:20) (100/66 - 118/77)  BP(mean): --  RR: 18 (22 @ 11:20) (18 - 18)  SpO2: 98% (22 @ 11:20) (96% - 98%)      Constitutional: No fever, fatigue  Skin: No rash.  Eyes: No recent vision problems or eye pain.  ENT: No congestion, ear pain, or sore throat.  Cardiovascular: No chest pain or palpation.  Respiratory: No cough, shortness of breath, congestion, or wheezing.  Gastrointestinal: No abdominal pain, nausea, vomiting, or diarrhea.  Genitourinary: No dysuria.  Musculoskeletal: No joint swelling.left foot drop  Neurologic: No headache.    PHYSICAL EXAM:  GENERAL: NAD  EYES: EOMI, PERRLA  NECK: Supple, No JVD  CHEST/LUNG: dec breath sounds at bases   HEART:  S1 , S2 +  ABDOMEN: soft , bs+  EXTREMITIES:  trace edema  NEUROLOGY:alert awake oriented    LABS:      136  |  102  |  17  ----------------------------<  80  3.9   |  25  |  0.87    Ca    8.6      11 Mar 2022 06:58  Mg     2.2     -10    TPro  5.7<L>  /  Alb  3.0<L>  /  TBili  0.4  /  DBili      /  AST  21  /  ALT  19  /  AlkPhos  71  03-10    Creatinine Trend: 0.87 <--, 0.82 <--, 0.80 <--, 0.78 <--, 0.80 <--                        7.9    7.71  )-----------( 404      ( 11 Mar 2022 07:03 )             25.3     Urine Studies:  Urinalysis Basic - ( 07 Mar 2022 19:26 )    Color: Colorless / Appearance: Clear / S.027 / pH:   Gluc:  / Ketone: Negative  / Bili: Negative / Urobili: Negative   Blood:  / Protein: Negative / Nitrite: Negative   Leuk Esterase: Negative / RBC: 1 /hpf / WBC 0 /HPF   Sq Epi:  / Non Sq Epi: 0 /hpf / Bacteria: Negative              LIVER FUNCTIONS - ( 10 Mar 2022 06:31 )  Alb: 3.0 g/dL / Pro: 5.7 g/dL / ALK PHOS: 71 U/L / ALT: 19 U/L / AST: 21 U/L / GGT: x                 LIVER FUNCTIONS - ( 10 Mar 2022 06:31 )  Alb: 3.0 g/dL / Pro: 5.7 g/dL / ALK PHOS: 71 U/L / ALT: 19 U/L / AST: 21 U/L / GGT: x               LIVER FUNCTIONS - ( 09 Mar 2022 06:37 )  Alb: 3.4 g/dL / Pro: 6.2 g/dL / ALK PHOS: 84 U/L / ALT: 17 U/L / AST: 20 U/L / GGT: x

## 2022-03-11 NOTE — PROGRESS NOTE ADULT - PROBLEM SELECTOR PLAN 2
-[facility]  -s/p second line chemotherapy Paclitaxel - started on 3/4/2022  -c/w home pain regimen oxycodone IR 5mg q4h PRN  -bowel regimen   -heme onc f/u    left foot drop - head ct neg , ? sec to chemo vs neurovascular pathology   - neuro f/u

## 2022-03-11 NOTE — PROGRESS NOTE ADULT - PROBLEM SELECTOR PLAN 2
+COVID PCR  -Fully vaccinated  -Remdesivir x 3-5 days (monitor creatinine, LFTs)  -Normoxic  -DVT ppx  -Trend inflammatory markers   -Pt with IVC filter in place, CTA chest neg PE.   -Ddimer elevated, LE duplex neg DVT

## 2022-03-11 NOTE — PROGRESS NOTE ADULT - ASSESSMENT
63 y/o M with PMH of Stage IV gastric adenocarcinoma w/ mets to peritoneum (dx 11/2021), HTN, Hx of PE (s/p Lovenox & IVC filter - now off Lovenox d/t to recent melena). Presents with cough/SOB. He went to [facility] and imaging there showed worsening disease progression and patient was prescribed Augmentin and discharged home. Now with continued symptoms, worsening cough, low grade temp. Found to be COVID positive in ED, CTA chest negative for PE, lymphangitic spread.

## 2022-03-11 NOTE — PROGRESS NOTE ADULT - ASSESSMENT
63yo M w/ PMHx of Stage IV gastric adenocarcinoma w/ mets to peritoneum, HTN, Hx of PE (s/p Lovenox & IVC filter) presents for cough/SOB, admitted with COVID PNA.    - No evidence of acute ischemia.  Pain was clearly related to coughing.  Hs trop negative.     - No evidence of meaningful volume overload, with only mild edema of his feet  - BP well controlled on losartan. Can continue  - Supp oxygen, steroids, remdesivir for COVID PNA  - trend d-dimer  - Monitor and replete lytes  - neuro evaluation for left foot weakness/drop  - TO follow closely while admitted

## 2022-03-11 NOTE — CHART NOTE - NSCHARTNOTEFT_GEN_A_CORE
Medicine PA Note     Notified by RN, patient is experiencing left foot weakness since dinnertime. Pt seen and examined, resting comfortably in his chair in no acute distress. Pt stated the pain started around 6PM. He described the pain as "static" and feels as if he is dragging his foot. Pt denies N/V, headache, blurry vision, numbness and weakness, chest pain, shortness of breath.    Vital Signs Last 24 Hrs  T(C): 36.6 (10 Mar 2022 21:16), Max: 36.6 (10 Mar 2022 05:37)  T(F): 97.9 (10 Mar 2022 21:16), Max: 97.9 (10 Mar 2022 05:37)  HR: 90 (10 Mar 2022 21:16) (87 - 90)  BP: 124/79 (10 Mar 2022 21:16) (110/74 - 124/79)  BP(mean): --  RR: 18 (10 Mar 2022 21:16) (18 - 18)  SpO2: 94% (10 Mar 2022 21:16) (94% - 98%)      Labs:                        7.8    6.28  )-----------( 389      ( 10 Mar 2022 06:31 )             24.8     03-10    138  |  102  |  16  ----------------------------<  109<H>  4.4   |  25  |  0.82    Ca    8.9      10 Mar 2022 06:31  Mg     2.2     03-10    TPro  5.7<L>  /  Alb  3.0<L>  /  TBili  0.4  /  DBili  x   /  AST  21  /  ALT  19  /  AlkPhos  71  03-10          REVIEW OF SYSTEMS:    CONSTITUTIONAL: No weakness, fevers or chills  EYES/ENT: No visual changes;  No vertigo or throat pain   NECK: No pain or stiffness  RESPIRATORY: No cough, wheezing, hemoptysis; No shortness of breath  CARDIOVASCULAR: No palpitations  GASTROINTESTINAL: No abdominal or epigastric pain. No nausea, vomiting, or hematemesis; No diarrhea or constipation. No melena or hematochezia.  GENITOURINARY: No dysuria, frequency or hematuria  NEUROLOGICAL: + leg foot weakness  SKIN: No itching, rashes    Physical Exam:  General: No acute distress  Neurology: A&Ox3, + weakness in left foot, unable to dorsiflex.   Respiratory: Clear breath sounds throughout.  CV: RRR, S1S2, no murmur  Abdominal: Soft, NT, ND no palpable mass  MSK: No edema, + peripheral pulses       Radiology:  < from: CT Head No Cont (03.10.22 @ 23:42) >  ******PRELIMINARY REPORT******    ******PRELIMINARY REPORT******   ACC: [id number] EXAM:  CT BRAIN                        PROCEDURE DATE:  03/10/2022    ******PRELIMINARY REPORT******    ******PRELIMINARY REPORT******   INTERPRETATION:  No acute intracranial hemorrhage, mass effect, or   midline shift.  No CT evidence of large vascular territory infarct.  Followup official report in AM.  ******PRELIMINARY REPORT******    ******PRELIMINARY REPORT******   RAMILA OLIVARES MD; Resident Radiology  < end of copied text >      Assessment & Plan:  HPI:  63yo M w/ PMHx of Stage IV gastric adenocarcinoma w/ mets to peritoneum, HTN, Hx of PE (s/p Lovenox & IVC filter) presents for cough/SOB, found to be anemic and COVID positive.    Pt is now presenting with left foot weakness, CT head with no abnormalities.         1) Left foot weakness  - CT Head as per Dr. Villanueva, CT, as noted above, showed no abnormalities.   - c/w monitoring overnight  - will followup with attending in AM  - will endorse to the AM team    Crystal Michel PA-C  Spectra # Medicine PA Note     Notified by RN, patient is experiencing left foot weakness since dinner time. Pt seen and examined, A&O x3, resting comfortably in his chair in no acute distress. Pt stated the weakness started around 6PM. He described having a "static" sensation and feels as if he is dragging his foot when walking. Pt denies N/V, headache, blurry vision, numbness and weakness, chest pain, shortness of breath.    Vital Signs Last 24 Hrs  T(C): 36.6 (10 Mar 2022 21:16), Max: 36.6 (10 Mar 2022 05:37)  T(F): 97.9 (10 Mar 2022 21:16), Max: 97.9 (10 Mar 2022 05:37)  HR: 90 (10 Mar 2022 21:16) (87 - 90)  BP: 124/79 (10 Mar 2022 21:16) (110/74 - 124/79)  BP(mean): --  RR: 18 (10 Mar 2022 21:16) (18 - 18)  SpO2: 94% (10 Mar 2022 21:16) (94% - 98%)      Labs:                        7.8    6.28  )-----------( 389      ( 10 Mar 2022 06:31 )             24.8     03-10    138  |  102  |  16  ----------------------------<  109<H>  4.4   |  25  |  0.82    Ca    8.9      10 Mar 2022 06:31  Mg     2.2     03-10    TPro  5.7<L>  /  Alb  3.0<L>  /  TBili  0.4  /  DBili  x   /  AST  21  /  ALT  19  /  AlkPhos  71  03-10          REVIEW OF SYSTEMS:    CONSTITUTIONAL: No weakness, fevers or chills  EYES/ENT: No visual changes;  No vertigo or throat pain   NECK: No pain or stiffness  RESPIRATORY: No cough, wheezing, hemoptysis; No shortness of breath  CARDIOVASCULAR: No palpitations  GASTROINTESTINAL: No abdominal or epigastric pain. No nausea, vomiting, or hematemesis; No diarrhea or constipation. No melena or hematochezia.  GENITOURINARY: No dysuria, frequency or hematuria  NEUROLOGICAL: + leg foot weakness  SKIN: No itching, rashes    Physical Exam:  General: No acute distress  Neurology: A&Ox3, + weakness in left foot, unable to dorsiflex. PERRL, rest of neuro exam unremarkable  Respiratory: Clear breath sounds throughout.  CV: RRR, S1S2, no murmur  Abdominal: Soft, NT, ND no palpable mass  MSK: No edema, + peripheral pulses, 5/5 strength throughout.      Radiology:  < from: CT Head No Cont (03.10.22 @ 23:42) >  ******PRELIMINARY REPORT******    ******PRELIMINARY REPORT******   ACC: [id number] EXAM:  CT BRAIN                        PROCEDURE DATE:  03/10/2022    ******PRELIMINARY REPORT******    ******PRELIMINARY REPORT******   INTERPRETATION:  No acute intracranial hemorrhage, mass effect, or   midline shift.  No CT evidence of large vascular territory infarct.  Followup official report in AM.  ******PRELIMINARY REPORT******    ******PRELIMINARY REPORT******   RAMILA OLIVARES MD; Resident Radiology  < end of copied text >      Assessment & Plan:  HPI:  61yo M w/ PMHx of Stage IV gastric adenocarcinoma w/ mets to peritoneum, HTN, Hx of PE (s/p Lovenox & IVC filter) presents for cough/SOB, found to be anemic and COVID positive.    Pt is now presenting with left foot weakness, CT head with no abnormalities.         1) Left foot weakness  - CT Head as per Dr. Villanueva, CT, as noted above, showed no abnormalities.   - Neuro checks per routine  - If symptoms persistently worsen, will consider neuro consult.  - will followup with attending in AM  - will endorse to the AM team    Crystal Michel PA-C  Department of Medicine  Spectra #[phone]

## 2022-03-11 NOTE — PROGRESS NOTE ADULT - PROBLEM SELECTOR PLAN 1
CTA chest with conglomerate lymphadenopathy in the mediastinal along with thickening of the bronchovascular interstitium suggestive of lymphangitic spread  -Pt normoxic but with persistent cough, SOB. Improving with Hycodan and steroids. Continue hycodan 5cc q6h (hold for sedation). Please give pt rx for Hycodan on discharge.  -Continue prednisone 30mg PO qd x 2 more days then decrease to 20mg PO qd and continue.  -Continue Protonix 40mg PO qd while on steroids   -Bactrim for PCP ppx as per ID  -F/u in office.

## 2022-03-11 NOTE — CONSULT NOTE ADULT - ASSESSMENT
61yo M w/  Stage IV gastric adenocarcinoma (dx 11/2021) w/ mets to peritoneum, HTN, Hx of PE (s/p Lovenox & IVC filter) presents for cough/SOB, found to be COVID +, CTA chest negative for PE. neuro called for L foot weakness.   o/e L foot weakness DF.   CTH neg  dimer 2800    Impression L foot Dorsi flexion weakness of unclear etiology;  chemo can cause neuropathy not weakness    - for covid, getting remdesivir until 3/12  and steroids   - get AFO brace for left foot   - c/f melana.   - palliaitve eval  - c/w covid airborne and contact precautions   - possible palliative RT  - r/o mets vs stroke but low suspicion as he has very focal weakness only in L foot dorsi flexion. consider MRI brain w/ and w/o ; would also consider MRI L spine but has no back pain  - Hemoglobin A1c and lipid panel  - telemetry  - PT/OT/SS/SLP, OOBC  - check FS, glucose control <180  - further recs based on above  - can consider outpatient EMG/NCS   - GI/DVT ppx  - Counseling on diet, exercise, and medication adherence was done  - Counseling on smoking cessation and alcohol consumption offered when appropriate.  - Pain assessed and judicious use of narcotics when appropriate was discussed.    - Stroke education given when appropriate.  - Importance of fall prevention discussed.   - Differential diagnosis and plan of care discussed with patient and/or family and primary team  - Thank you for allowing me to participate in the care of this patient. Call with questions.   Red Novak MD  Vascular Neurology  Office: [phone]

## 2022-03-11 NOTE — PROGRESS NOTE ADULT - ASSESSMENT
Patient is a 62 year old male under care at [facility] Dr. Brad Rojas with stage IV gastric adenocarcinoma (pMMR, HER-2 0+, CPS <1) with mets to the peritoneum. First cycle of chemotherapy was FOLFOX/Nivolumab - patient initially had response but was admitted to [facility] for SOB, cough and found to have progression of disease. CT chest/abdomen/pelvis 3/2/2022 showed significant progression of diease and lymphangitic spread. Patient is also reporting dark bowel movements, strongly concerning for melena.    Patient received one dose of paclitaxel 80 mg/m2 on 3/4/2022. RT consult was placed but he has not yet seen Dr. Forrest of [facility]. He is now at [facility] with ongoing SOB, cough and dark stools.    Stage IV Gastric Cancer  --Under Care by Dr. Brad Rojas at [facility]  --S/P second line of chemotherapy - Paclitaxel 80 mg/m2 - started on 3/4/2022  --Next chemo due 3/11 - will have to postpone until after discharge  --Patient was also being evaluated for palliative RT  --RT consulted for palliative RT as requested by Dr. Rojas (pain and bleeding) - they are requesting GI eval with EGD first  --Will hold on GI consult given symptomatic improvement and other more acute issues  --Palliative consult appreciated for symptom management with persistent cough.    Anemia  --Most likely multifactorial  --Anemia of chronic disease/chemotherapy  --FOBT negative  --Iron work up shows elevated Ferritin (most likely acute phase reactant),   --Iron studies more consistent with anemia of chronic disease    COVID-19 Positive, Shortness of Breath, Fever  --Lymphangitic spread on CT  --On Room Air  --CTA negative for pulmonary embolism, Conglomerate lymphadenopathy in the mediastinal along with thickening of the bronchovascular interstitium suggestive of lymphangitic spread. Small bilateral pleural effusions. Partially imaged left adrenal mass measuring 4.3 x 3.2 cm consistent with metastatic disease  --Pulmonary input appreciated  --Started on Methylprednisolone 20 mg TID    COVID-19 +  --On Day 4/5 Remdesivir and   --Clinically improving  --Pulmonary/ID following    Left Foot Drop  --Acute onset and unifocal  --CTH negative for acute pathology  --Neuro consult called  --Taxol and Oxaliplatin can cause neuropathy but clinical picture is atypical    Discussed with Dr. Villanueva. Will also discuss with Dr. Rojas    We will continue to follow patient and coordinate with [facility].    Alejandro Ureña PA-C  Hematology/Oncology  [facility]   [phone] (office)  [phone] (alt office)  Evenings and weekends please call MD on call or office

## 2022-03-11 NOTE — PROGRESS NOTE ADULT - SUBJECTIVE AND OBJECTIVE BOX
[facility] - Kanchan Meehan, David, Brenda, Ana Maria, Clif Rodriguez  Office Number:  [phone]    Patient resting comfortably in bed in NAD.  Laying flat with no respiratory distress.  No complaints of chest pain, dyspnea, palpitations, PND, or orthopnea.  reports left foot weakness overnight    ROS: negative unless otherwise mentioned.    Telemetry:  off    MEDICATIONS  (STANDING):  chlorhexidine 2% Cloths 1 Application(s) Topical daily  enoxaparin Injectable 40 milliGRAM(s) SubCutaneous every 12 hours  losartan 100 milliGRAM(s) Oral daily  pantoprazole    Tablet 40 milliGRAM(s) Oral before breakfast  polyethylene glycol 3350 17 Gram(s) Oral daily  predniSONE   Tablet 30 milliGRAM(s) Oral daily  remdesivir  IVPB   IV Intermittent   remdesivir  IVPB 100 milliGRAM(s) IV Intermittent every 24 hours  senna 2 Tablet(s) Oral at bedtime    MEDICATIONS  (PRN):  acetaminophen     Tablet .. 650 milliGRAM(s) Oral every 4 hours PRN Temp greater or equal to 38.5C (101.3F)  ALBUTerol    90 MICROgram(s) HFA Inhaler 2 Puff(s) Inhalation every 4 hours PRN Shortness of Breath and/or Wheezing  benzonatate 200 milliGRAM(s) Oral three times a day PRN Cough  clonazePAM  Tablet 0.5 milliGRAM(s) Oral at bedtime PRN anxiety  hydrocodone/homatropine Syrup 5 milliLiter(s) Oral every 6 hours PRN Cough  ondansetron Injectable 4 milliGRAM(s) IV Push every 8 hours PRN Nausea and/or Vomiting  oxyCODONE    IR 5 milliGRAM(s) Oral every 4 hours PRN Severe Pain (7 - 10)      Allergies    No Known Allergies    Intolerances        Vital Signs Last 24 Hrs  T(C): 36.7 (11 Mar 2022 04:37), Max: 36.7 (11 Mar 2022 04:37)  T(F): 98.1 (11 Mar 2022 04:37), Max: 98.1 (11 Mar 2022 04:37)  HR: 73 (11 Mar 2022 05:18) (73 - 90)  BP: 110/71 (11 Mar 2022 05:18) (100/66 - 124/79)  BP(mean): --  RR: 18 (11 Mar 2022 04:37) (18 - 18)  SpO2: 96% (11 Mar 2022 04:37) (94% - 98%)    I&O's Summary    10 Mar 2022 07:01  -  11 Mar 2022 07:00  --------------------------------------------------------  IN: 1020 mL / OUT: 300 mL / NET: 720 mL        ON EXAM:  Constitutional: well-nourished, well-developed, NAD   HEENT:  MMM, sclerae anicteric, conjunctivae clear, no oral cyanosis.  Pulmonary: Non-labored, breath sounds are clear bilaterally, No wheezing, rales or rhonchi  Cardiovascular: Regular, S1 and S2.  No murmur.  No rubs, gallops or clicks  Gastrointestinal: Bowel Sounds present, soft, nontender.   Lymph: mild peripheral edema.   Neurological: Alert, no focal deficits  Skin: No rashes.  Psych:  Mood & affect appropriate    LABS: All Labs Reviewed:                        7.9    7.71  )-----------( 404      ( 11 Mar 2022 07:03 )             25.3                         7.8    6.28  )-----------( 389      ( 10 Mar 2022 06:31 )             24.8                         8.7    4.96  )-----------( 379      ( 09 Mar 2022 06:41 )             27.6     11 Mar 2022 06:58    136    |  102    |  17     ----------------------------<  80     3.9     |  25     |  0.87   10 Mar 2022 06:31    138    |  102    |  16     ----------------------------<  109    4.4     |  25     |  0.82   09 Mar 2022 06:37    135    |  99     |  12     ----------------------------<  149    4.9     |  25     |  0.80     Ca    8.6        11 Mar 2022 06:58  Ca    8.9        10 Mar 2022 06:31  Ca    8.9        09 Mar 2022 06:37  Mg     2.2       10 Mar 2022 06:31  Mg     2.5       09 Mar 2022 06:37    TPro  5.7    /  Alb  3.0    /  TBili  0.4    /  DBili  x      /  AST  21     /  ALT  19     /  AlkPhos  71     10 Mar 2022 06:31  TPro  6.2    /  Alb  3.4    /  TBili  0.4    /  DBili  x      /  AST  20     /  ALT  17     /  AlkPhos  84     09 Mar 2022 06:37          Blood Culture: Organism --  Gram Stain Blood -- Gram Stain --  Specimen Source Clean Catch Clean Catch (Midstream)  Culture-Blood --    Organism --  Gram Stain Blood -- Gram Stain --  Specimen Source .Blood Blood-Venous  Culture-Blood --    Organism --  Gram Stain Blood -- Gram Stain --  Specimen Source .Blood Blood-Venous  Culture-Blood --

## 2022-03-12 LAB
CULTURE RESULTS: SIGNIFICANT CHANGE UP
CULTURE RESULTS: SIGNIFICANT CHANGE UP
HCT VFR BLD CALC: 28.9 % — LOW (ref 39–50)
HGB BLD-MCNC: 8.8 G/DL — LOW (ref 13–17)
MCHC RBC-ENTMCNC: 25.4 PG — LOW (ref 27–34)
MCHC RBC-ENTMCNC: 30.4 GM/DL — LOW (ref 32–36)
MCV RBC AUTO: 83.3 FL — SIGNIFICANT CHANGE UP (ref 80–100)
NRBC # BLD: 0 /100 WBCS — SIGNIFICANT CHANGE UP (ref 0–0)
PLATELET # BLD AUTO: 360 K/UL — SIGNIFICANT CHANGE UP (ref 150–400)
RBC # BLD: 3.47 M/UL — LOW (ref 4.2–5.8)
RBC # FLD: 30.6 % — HIGH (ref 10.3–14.5)
SPECIMEN SOURCE: SIGNIFICANT CHANGE UP
SPECIMEN SOURCE: SIGNIFICANT CHANGE UP
WBC # BLD: 11.15 K/UL — HIGH (ref 3.8–10.5)
WBC # FLD AUTO: 11.15 K/UL — HIGH (ref 3.8–10.5)

## 2022-03-12 PROCEDURE — 99232 SBSQ HOSP IP/OBS MODERATE 35: CPT

## 2022-03-12 RX ORDER — ACETAMINOPHEN 500 MG
650 TABLET ORAL EVERY 6 HOURS
Refills: 0 | Status: DISCONTINUED | OUTPATIENT
Start: 2022-03-12 | End: 2022-03-14

## 2022-03-12 RX ORDER — CODEINE SULFATE 60 MG/1
15 TABLET ORAL EVERY 6 HOURS
Refills: 0 | Status: DISCONTINUED | OUTPATIENT
Start: 2022-03-12 | End: 2022-03-14

## 2022-03-12 RX ADMIN — OXYCODONE HYDROCHLORIDE 5 MILLIGRAM(S): 5 TABLET ORAL at 10:23

## 2022-03-12 RX ADMIN — Medication 30 MILLIGRAM(S): at 05:59

## 2022-03-12 RX ADMIN — Medication 200 MILLIGRAM(S): at 18:00

## 2022-03-12 RX ADMIN — CODEINE SULFATE 15 MILLIGRAM(S): 60 TABLET ORAL at 18:00

## 2022-03-12 RX ADMIN — OXYCODONE HYDROCHLORIDE 5 MILLIGRAM(S): 5 TABLET ORAL at 09:53

## 2022-03-12 RX ADMIN — Medication 30 MILLIGRAM(S): at 18:00

## 2022-03-12 RX ADMIN — PANTOPRAZOLE SODIUM 40 MILLIGRAM(S): 20 TABLET, DELAYED RELEASE ORAL at 06:20

## 2022-03-12 RX ADMIN — CODEINE SULFATE 15 MILLIGRAM(S): 60 TABLET ORAL at 23:50

## 2022-03-12 RX ADMIN — REMDESIVIR 500 MILLIGRAM(S): 5 INJECTION INTRAVENOUS at 17:59

## 2022-03-12 RX ADMIN — SENNA PLUS 2 TABLET(S): 8.6 TABLET ORAL at 21:25

## 2022-03-12 RX ADMIN — CHLORHEXIDINE GLUCONATE 1 APPLICATION(S): 213 SOLUTION TOPICAL at 11:15

## 2022-03-12 RX ADMIN — ENOXAPARIN SODIUM 40 MILLIGRAM(S): 100 INJECTION SUBCUTANEOUS at 05:59

## 2022-03-12 RX ADMIN — ENOXAPARIN SODIUM 40 MILLIGRAM(S): 100 INJECTION SUBCUTANEOUS at 17:58

## 2022-03-12 NOTE — PROGRESS NOTE ADULT - ASSESSMENT
61yo M w/  Stage IV gastric adenocarcinoma (dx 11/2021) w/ mets to peritoneum, HTN, Hx of PE (s/p Lovenox & IVC filter) presents for cough/SOB, found to be COVID +, CTA chest negative for PE. neuro called for L foot weakness.   o/e L foot weakness DF.   CTH neg  dimer 2800    Impression L foot Dorsi flexion weakness of unclear etiology;  chemo can cause neuropathy not weakness    - for covid, getting remdesivir until 3/12  and steroids   - get AFO brace for left foot   - c/f melana.   - palliaitve eval  - c/w covid airborne and contact precautions   - possible palliative RT  - r/o mets vs stroke but low suspicion as he has very focal weakness only in L foot dorsi flexion. consider MRI brain w/ and w/o ; would also consider MRI L spine but has no back pain  - Hemoglobin A1c and lipid panel  - telemetry  - PT/OT/SS/SLP, OOBC  - check FS, glucose control <180  - further recs based on above  - can consider outpatient EMG/NCS   - GI/DVT ppx  - Counseling on diet, exercise, and medication adherence was done  - Counseling on smoking cessation and alcohol consumption offered when appropriate.  - Pain assessed and judicious use of narcotics when appropriate was discussed.    - Stroke education given when appropriate.  - Importance of fall prevention discussed.   - Differential diagnosis and plan of care discussed with patient and/or family and primary team  - Thank you for allowing me to participate in the care of this patient. Call with questions.   Red Novak MD  Vascular Neurology  Office: [phone]      63yo M w/  Stage IV gastric adenocarcinoma (dx 11/2021) w/ mets to peritoneum, HTN, Hx of PE (s/p Lovenox & IVC filter) presents for cough/SOB, found to be COVID +, CTA chest negative for PE. neuro called for L foot weakness.   o/e L foot weakness DF.   CTH neg  dimer 2800  MRI brain neg  MRI L spine with non specific enhancement L5/S1; f/u in 6 months recommended     Impression L foot Dorsi flexion weakness of unclear etiology;  chemo can cause neuropathy not weakness    - for covid, getting remdesivir until 3/12  and steroids   - get AFO brace for left foot   - c/f melana.   - palliaitve eval  - c/w covid airborne and contact precautions   - possible palliative RT  - repeat MRI L spine in 6 months   - telemetry  - PT/OT/SS/SLP, OOBC  - check FS, glucose control <180  - further recs based on above  - can consider outpatient EMG/NCS   - GI/DVT ppx  - Thank you for allowing me to participate in the care of this patient. Call with questions.   Red Novak MD  Vascular Neurology  Office: [phone]

## 2022-03-12 NOTE — PROGRESS NOTE ADULT - SUBJECTIVE AND OBJECTIVE BOX
SUBJECTIVE / OVERNIGHT EVENTS:pt seen and examined, pt states that his breathing is better than before, c/io left foot weakness/ left foot drop  22     MEDICATIONS  (STANDING):  chlorhexidine 2% Cloths 1 Application(s) Topical daily  codeine 15 milliGRAM(s) Oral every 6 hours  enoxaparin Injectable 40 milliGRAM(s) SubCutaneous every 12 hours  hydrocodone/homatropine Syrup 5 milliLiter(s) Oral every 6 hours  losartan 100 milliGRAM(s) Oral daily  pantoprazole    Tablet 40 milliGRAM(s) Oral before breakfast  polyethylene glycol 3350 17 Gram(s) Oral daily  predniSONE   Tablet 30 milliGRAM(s) Oral two times a day  senna 2 Tablet(s) Oral at bedtime  trimethoprim  160 mG/sulfamethoxazole 800 mG 1 Tablet(s) Oral <User Schedule>    MEDICATIONS  (PRN):  acetaminophen     Tablet .. 650 milliGRAM(s) Oral every 4 hours PRN Temp greater or equal to 38.5C (101.3F)  acetaminophen     Tablet .. 650 milliGRAM(s) Oral every 6 hours PRN Mild Pain (1 - 3), Moderate Pain (4 - 6)  ALBUTerol    90 MICROgram(s) HFA Inhaler 2 Puff(s) Inhalation every 4 hours PRN Shortness of Breath and/or Wheezing  benzonatate 200 milliGRAM(s) Oral three times a day PRN Cough  clonazePAM  Tablet 0.5 milliGRAM(s) Oral at bedtime PRN anxiety  ondansetron Injectable 4 milliGRAM(s) IV Push every 8 hours PRN Nausea and/or Vomiting  oxyCODONE    IR 5 milliGRAM(s) Oral every 4 hours PRN Severe Pain (7 - 10)    Vital Signs Last 24 Hrs  T(C): 36.7 (22 @ 21:22), Max: 37.1 (22 @ 05:36)  T(F): 98.1 (22 @ 21:22), Max: 98.8 (22 @ 05:36)  HR: 95 (22 @ 21:22) (95 - 107)  BP: 113/74 (22 @ 21:22) (100/69 - 113/74)  BP(mean): --  RR: 17 (22 @ 21:22) (17 - 18)  SpO2: 94% (22 @ 21:22) (94% - 97%)          Constitutional: No fever, fatigue  Skin: No rash.  Eyes: No recent vision problems or eye pain.  ENT: No congestion, ear pain, or sore throat.  Cardiovascular: No chest pain or palpation.  Respiratory: No cough, shortness of breath, congestion, or wheezing.  Gastrointestinal: No abdominal pain, nausea, vomiting, or diarrhea.  Genitourinary: No dysuria.  Musculoskeletal: No joint swelling.left foot drop  Neurologic: No headache.    PHYSICAL EXAM:  GENERAL: NAD  EYES: EOMI, PERRLA  NECK: Supple, No JVD  CHEST/LUNG: dec breath sounds at bases   HEART:  S1 , S2 +  ABDOMEN: soft , bs+  EXTREMITIES:  trace edema  NEUROLOGY:alert awake oriented    LABS:      136  |  102  |  17  ----------------------------<  80  3.9   |  25  |  0.87    Ca    8.6      11 Mar 2022 06:58      Creatinine Trend: 0.87 <--, 0.82 <--, 0.80 <--, 0.78 <--, 0.80 <--                        8.8    11.15 )-----------( 360      ( 12 Mar 2022 07:05 )             28.9     Urine Studies:  Urinalysis Basic - ( 07 Mar 2022 19:26 )    Color: Colorless / Appearance: Clear / S.027 / pH:   Gluc:  / Ketone: Negative  / Bili: Negative / Urobili: Negative   Blood:  / Protein: Negative / Nitrite: Negative   Leuk Esterase: Negative / RBC: 1 /hpf / WBC 0 /HPF   Sq Epi:  / Non Sq Epi: 0 /hpf / Bacteria: Negative

## 2022-03-12 NOTE — PROGRESS NOTE ADULT - SUBJECTIVE AND OBJECTIVE BOX
[facility] -- Kanchan Meehan, David, Brenda, Michael Rodgers Savella  Office # [phone]      Follow Up:  dyspnea     Subjective/Observations: Patient seen and examined. Events noted. Resting comfortably in bed. No complaints of  dyspnea, or palpitations reported. No signs of orthopnea or PND. pleuritic cp      REVIEW OF SYSTEMS: All other review of systems is negative unless indicated above    PAST MEDICAL & SURGICAL HISTORY:  Gastric adenocarcinoma  stage 4    Hypertension    S/P IVC filter    Anorectal abscess        MEDICATIONS  (STANDING):  chlorhexidine 2% Cloths 1 Application(s) Topical daily  codeine 15 milliGRAM(s) Oral every 6 hours  enoxaparin Injectable 40 milliGRAM(s) SubCutaneous every 12 hours  hydrocodone/homatropine Syrup 5 milliLiter(s) Oral every 6 hours  losartan 100 milliGRAM(s) Oral daily  pantoprazole    Tablet 40 milliGRAM(s) Oral before breakfast  polyethylene glycol 3350 17 Gram(s) Oral daily  predniSONE   Tablet 30 milliGRAM(s) Oral two times a day  remdesivir  IVPB   IV Intermittent   remdesivir  IVPB 100 milliGRAM(s) IV Intermittent every 24 hours  senna 2 Tablet(s) Oral at bedtime  trimethoprim  160 mG/sulfamethoxazole 800 mG 1 Tablet(s) Oral <User Schedule>    MEDICATIONS  (PRN):  acetaminophen     Tablet .. 650 milliGRAM(s) Oral every 4 hours PRN Temp greater or equal to 38.5C (101.3F)  acetaminophen     Tablet .. 650 milliGRAM(s) Oral every 6 hours PRN Mild Pain (1 - 3), Moderate Pain (4 - 6)  ALBUTerol    90 MICROgram(s) HFA Inhaler 2 Puff(s) Inhalation every 4 hours PRN Shortness of Breath and/or Wheezing  benzonatate 200 milliGRAM(s) Oral three times a day PRN Cough  clonazePAM  Tablet 0.5 milliGRAM(s) Oral at bedtime PRN anxiety  ondansetron Injectable 4 milliGRAM(s) IV Push every 8 hours PRN Nausea and/or Vomiting  oxyCODONE    IR 5 milliGRAM(s) Oral every 4 hours PRN Severe Pain (7 - 10)      Allergies    No Known Allergies    Intolerances            Vital Signs Last 24 Hrs  T(C): 36.8 (12 Mar 2022 12:34), Max: 37.1 (12 Mar 2022 05:36)  T(F): 98.2 (12 Mar 2022 12:34), Max: 98.8 (12 Mar 2022 05:36)  HR: 99 (12 Mar 2022 12:34) (99 - 107)  BP: 107/70 (12 Mar 2022 12:34) (100/69 - 122/83)  BP(mean): --  RR: 18 (12 Mar 2022 12:34) (17 - 18)  SpO2: 96% (12 Mar 2022 12:34) (96% - 97%)    I&O's Summary        PHYSICAL EXAM:    Constitutional: NAD, awake and alert, well-developed  HEENT: Moist Mucous Membranes, Anicteric  Pulmonary: Non-labored, breath sounds are clear bilaterally, No wheezing, rales or rhonchi  Cardiovascular: Regular, S1 and S2, No murmurs, rubs, gallops or clicks  Gastrointestinal: Bowel Sounds present, soft, nontender.   Lymph: No peripheral edema. No lymphadenopathy.  Skin: No visible rashes or ulcers.  Psych:  Mood & affect appropriate for situation    LABS: All Labs Reviewed:                        8.8    11.15 )-----------( 360      ( 12 Mar 2022 07:05 )             28.9                         7.9    7.71  )-----------( 404      ( 11 Mar 2022 07:03 )             25.3                         7.8    6.28  )-----------( 389      ( 10 Mar 2022 06:31 )             24.8     11 Mar 2022 06:58    136    |  102    |  17     ----------------------------<  80     3.9     |  25     |  0.87   10 Mar 2022 06:31    138    |  102    |  16     ----------------------------<  109    4.4     |  25     |  0.82     Ca    8.6        11 Mar 2022 06:58  Ca    8.9        10 Mar 2022 06:31  Mg     2.2       10 Mar 2022 06:31    TPro  5.7    /  Alb  3.0    /  TBili  0.4    /  DBili  x      /  AST  21     /  ALT  19     /  AlkPhos  71     10 Mar 2022 06:31

## 2022-03-12 NOTE — PROGRESS NOTE ADULT - SUBJECTIVE AND OBJECTIVE BOX
I fit Jeremy with a left carbon AFO to reduce and control his left foot drop. The brace was placed in a Darco shoe at this time, and he was advised regarding removing it to a sneaker or shoe of his own, placing it beneath the insole. Two tibial length socks were provided . He stood up and walked around the room very well with the brace on.   [place] [phone]

## 2022-03-12 NOTE — PROGRESS NOTE ADULT - ASSESSMENT
61yo M w/ PMHx of Stage IV gastric adenocarcinoma w/ mets to peritoneum, HTN, Hx of PE (s/p Lovenox & IVC filter) presents for cough/SOB, admitted with COVID PNA.    - No evidence of acute ischemia.  Pain was clearly related to coughing.  Hs trop negative.     - No evidence of meaningful volume overload, with only mild edema of his feet  - BP well controlled on losartan. Can continue  - Supp oxygen, steroids, remdesivir for COVID PNA  - trend d-dimer  - Monitor and replete lytes  - neuro evaluation for left foot weakness/drop  - TO follow closely while admitted

## 2022-03-12 NOTE — PROGRESS NOTE ADULT - SUBJECTIVE AND OBJECTIVE BOX
Follow-up Pulm Progress Note    c/o increased cough, cough not sleep at night  c/o sob only when coughing     Medications:  MEDICATIONS  (STANDING):  chlorhexidine 2% Cloths 1 Application(s) Topical daily  enoxaparin Injectable 40 milliGRAM(s) SubCutaneous every 12 hours  losartan 100 milliGRAM(s) Oral daily  pantoprazole    Tablet 40 milliGRAM(s) Oral before breakfast  polyethylene glycol 3350 17 Gram(s) Oral daily  predniSONE   Tablet 30 milliGRAM(s) Oral daily  remdesivir  IVPB   IV Intermittent   remdesivir  IVPB 100 milliGRAM(s) IV Intermittent every 24 hours  senna 2 Tablet(s) Oral at bedtime  trimethoprim  160 mG/sulfamethoxazole 800 mG 1 Tablet(s) Oral <User Schedule>    MEDICATIONS  (PRN):  acetaminophen     Tablet .. 650 milliGRAM(s) Oral every 4 hours PRN Temp greater or equal to 38.5C (101.3F)  acetaminophen     Tablet .. 650 milliGRAM(s) Oral every 6 hours PRN Mild Pain (1 - 3), Moderate Pain (4 - 6)  ALBUTerol    90 MICROgram(s) HFA Inhaler 2 Puff(s) Inhalation every 4 hours PRN Shortness of Breath and/or Wheezing  benzonatate 200 milliGRAM(s) Oral three times a day PRN Cough  clonazePAM  Tablet 0.5 milliGRAM(s) Oral at bedtime PRN anxiety  hydrocodone/homatropine Syrup 5 milliLiter(s) Oral every 6 hours PRN Cough  ondansetron Injectable 4 milliGRAM(s) IV Push every 8 hours PRN Nausea and/or Vomiting  oxyCODONE    IR 5 milliGRAM(s) Oral every 4 hours PRN Severe Pain (7 - 10)          Vital Signs Last 24 Hrs  T(C): 37.1 (12 Mar 2022 05:36), Max: 37.1 (12 Mar 2022 05:36)  T(F): 98.8 (12 Mar 2022 05:36), Max: 98.8 (12 Mar 2022 05:36)  HR: 107 (12 Mar 2022 05:36) (100 - 107)  BP: 100/69 (12 Mar 2022 05:36) (100/69 - 122/83)  BP(mean): --  RR: 18 (12 Mar 2022 05:36) (17 - 18)  SpO2: 97% (12 Mar 2022 05:36) (96% - 97%)              LABS:                        8.8    11.15 )-----------( 360      ( 12 Mar 2022 07:05 )             28.9     03-11    136  |  102  |  17  ----------------------------<  80  3.9   |  25  |  0.87    Ca    8.6      11 Mar 2022 06:58            CULTURES:     Culture - Blood (collected 03-07-22 @ 18:32)  Source: .Blood Blood-Venous  Preliminary Report (03-08-22 @ 19:02):    No growth to date.    Culture - Blood (collected 03-07-22 @ 18:31)  Source: .Blood Blood-Venous  Preliminary Report (03-08-22 @ 19:02):    No growth to date.        Culture - Urine (collected 03-07-22 @ 22:12)  Source: Clean Catch Clean Catch (Midstream)  Final Report (03-08-22 @ 17:11):    <10,000 CFU/mL Normal Urogenital Yadira              Physical Examination:  PULM: Clear to auscultation bilaterally, no significant sputum production  CVS: S1, S2 heard      RADIOLOGY REVIEWED  CT chest: < from: CT Angio Chest PE Protocol w/ IV Cont (03.07.22 @ 16:19) >  Tubes/Lines: Right Mediport catheter seen with tip in SVC    Mediastinum and Heart: Aorta and pulmonary arteries are normal in size.   Thyroid gland is unremarkable. There is conglomerate lymphadenopathy in   the mediastinum including the prevascular, right left paratracheal, AP   window subcarinal and right hilar stations. The largest area of   lymphadenopathy is conglomerate lymphadenopathy in the right hilum   measuring approximately 3 x 3.9 cm. There is narrowing of the proximal   right and left mainstem bronchi from conglomerate lymphadenopathy. No   pericardial effusion.    Lungs, Pleura, and Airways: There is no pulmonary embolus. Bilateral   thickening of the peribronchial vascular interstitium along with areas of   septal thickening suggestive of lymphangitic spread.    Small bilateral pleural effusions noted.    Visualized Abdomen: IVC filter is noted in place. A partially imaged left   adrenal mass measures 4.3 x 3.2 cm.    Bones and soft tissues: Unremarkable.    IMPRESSION:    No pulmonary embolus.    Conglomerate lymphadenopathy in the mediastinal along with thickening of   the bronchovascular interstitium suggestive of lymphangitic spread.    Small bilateral pleural effusions.    Partially imaged left adrenal mass measuring 4.3 x 3.2 cm consistent with   metastatic disease.    --- End of Report ---    < end of copied text >

## 2022-03-12 NOTE — PROGRESS NOTE ADULT - ASSESSMENT
61 y/o M with PMH of Stage IV gastric adenocarcinoma w/ mets to peritoneum (dx 11/2021), HTN, Hx of PE (s/p Lovenox & IVC filter - now off Lovenox d/t to recent melena). Presents with cough/SOB. He went to [facility] and imaging there showed worsening disease progression and patient was prescribed Augmentin and discharged home. Now with continued symptoms, worsening cough, low grade temp. Found to be COVID positive in ED, CTA chest negative for PE, lymphangitic spread.

## 2022-03-12 NOTE — PROGRESS NOTE ADULT - SUBJECTIVE AND OBJECTIVE BOX
Neurology Progress Note    S: Patient seen and examined. No new events overnight. patient denied  HA. c/o CP from cough    Medication:  acetaminophen     Tablet .. 650 milliGRAM(s) Oral every 4 hours PRN  acetaminophen     Tablet .. 650 milliGRAM(s) Oral every 6 hours PRN  ALBUTerol    90 MICROgram(s) HFA Inhaler 2 Puff(s) Inhalation every 4 hours PRN  benzonatate 200 milliGRAM(s) Oral three times a day PRN  chlorhexidine 2% Cloths 1 Application(s) Topical daily  clonazePAM  Tablet 0.5 milliGRAM(s) Oral at bedtime PRN  enoxaparin Injectable 40 milliGRAM(s) SubCutaneous every 12 hours  hydrocodone/homatropine Syrup 5 milliLiter(s) Oral every 6 hours PRN  losartan 100 milliGRAM(s) Oral daily  ondansetron Injectable 4 milliGRAM(s) IV Push every 8 hours PRN  oxyCODONE    IR 5 milliGRAM(s) Oral every 4 hours PRN  pantoprazole    Tablet 40 milliGRAM(s) Oral before breakfast  polyethylene glycol 3350 17 Gram(s) Oral daily  predniSONE   Tablet 30 milliGRAM(s) Oral daily  remdesivir  IVPB   IV Intermittent   remdesivir  IVPB 100 milliGRAM(s) IV Intermittent every 24 hours  senna 2 Tablet(s) Oral at bedtime  trimethoprim  160 mG/sulfamethoxazole 800 mG 1 Tablet(s) Oral <User Schedule>      Vitals:  Vital Signs Last 24 Hrs  T(C): 37.1 (12 Mar 2022 05:36), Max: 37.1 (12 Mar 2022 05:36)  T(F): 98.8 (12 Mar 2022 05:36), Max: 98.8 (12 Mar 2022 05:36)  HR: 107 (12 Mar 2022 05:36) (97 - 107)  BP: 100/69 (12 Mar 2022 05:36) (100/69 - 122/83)  BP(mean): --  RR: 18 (12 Mar 2022 05:36) (17 - 18)  SpO2: 97% (12 Mar 2022 05:36) (96% - 98%)    General Exam:   General Appearance: Appropriately dressed and in no acute distress       Head: Normocephalic, atraumatic and no dysmorphic features  Ear, Nose, and Throat: Moist mucous membranes  CVS: S1S2+  Resp: No SOB, no wheeze or rhonchi  Abd: soft NTND  Extremities: No edema, no cyanosis  Skin: No bruises, no rashes     Neurological Exam:  Mental Status: Awake, alert and oriented x 3.  Able to follow simple and complex verbal commands. Able to name and repeat. fluent speech. No obvious aphasia or dysarthria noted.   Cranial Nerves: PERRL, EOMI, VFFC, sensation V1-V3 intact,  no obvious facial asymmetry, equal elevation of palate, scm/trap 5/5, tongue is midline on protrusion. no obvious papilledema on fundoscopic exam. hearing is grossly intact.   Motor: Normal bulk, tone and strength throughout except L weakness in dorsiflexion 3/5 . Fine finger movements were intact and symmetric. no tremors or drift noted.    Sensation: Intact to light touch and pinprick throughout. no right/left confusion. no extinction to tactile on DSS. Romberg was negative.   Reflexes: 1+ throughout at biceps, brachioradialis, triceps, patellars and ankles bilaterally and equal. No clonus. R toe and L toe were both downgoing.  Coordination: No dysmetria on FNF   Gait: deferred      I personally reviewed the below data/images/labs:      CBC Full  -  ( 12 Mar 2022 07:05 )  WBC Count : 11.15 K/uL  RBC Count : 3.47 M/uL  Hemoglobin : 8.8 g/dL  Hematocrit : 28.9 %  Platelet Count - Automated : 360 K/uL  Mean Cell Volume : 83.3 fl  Mean Cell Hemoglobin : 25.4 pg  Mean Cell Hemoglobin Concentration : 30.4 gm/dL  Auto Neutrophil # : x  Auto Lymphocyte # : x  Auto Monocyte # : x  Auto Eosinophil # : x  Auto Basophil # : x  Auto Neutrophil % : x  Auto Lymphocyte % : x  Auto Monocyte % : x  Auto Eosinophil % : x  Auto Basophil % : x    03-11    136  |  102  |  17  ----------------------------<  80  3.9   |  25  |  0.87    Ca    8.6      11 Mar 2022 06:58      ACC: [id number] EXAM:  CT BRAIN                          PROCEDURE DATE:  03/10/2022          INTERPRETATION:  .    CLINICAL INFORMATION: Left-sided foot weakness. Stage IV gastric cancer.    TECHNIQUE: Multiple axial CT images of the head were obtained without   contrast. Sagittal and coronal reconstructed images were acquired from   the source data.    COMPARISON: No prior CT studies of the brain are available for comparison   at this institution.    FINDINGS: There is no acute intracranial hemorrhage, mass effect, shift   of the midline structures, herniation, extra-axial fluid collection, or   hydrocephalus.    There is diffuse cerebral volume loss with prominence of the sulci,   fissures, and cisternal spaces which is normal for the patient's age.   There is minimal deep and periventricular white matter hypoattenuation   statistically compatible with microvascular changes given calcific   atherosclerotic disease of the intracranial arteries.    Scattered mucosal thickening is seen throughout the paranasal sinuses,   and associated in the left sphenoid sinus. The calvarium is intact. The   imaged orbits are unremarkable.    IMPRESSION: No acute intracranial hemorrhage, mass effect, or shift of   the midline structures.    --- End of Report ---                   Neurology Progress Note    S: Patient seen and examined. No new events overnight. patient denied  HA. c/o CP from cough    Medication:  MEDICATIONS  (STANDING):  chlorhexidine 2% Cloths 1 Application(s) Topical daily  enoxaparin Injectable 40 milliGRAM(s) SubCutaneous every 12 hours  losartan 100 milliGRAM(s) Oral daily  pantoprazole    Tablet 40 milliGRAM(s) Oral before breakfast  polyethylene glycol 3350 17 Gram(s) Oral daily  predniSONE   Tablet 30 milliGRAM(s) Oral daily  remdesivir  IVPB   IV Intermittent   remdesivir  IVPB 100 milliGRAM(s) IV Intermittent every 24 hours  senna 2 Tablet(s) Oral at bedtime  trimethoprim  160 mG/sulfamethoxazole 800 mG 1 Tablet(s) Oral <User Schedule>    MEDICATIONS  (PRN):  acetaminophen     Tablet .. 650 milliGRAM(s) Oral every 4 hours PRN Temp greater or equal to 38.5C (101.3F)  acetaminophen     Tablet .. 650 milliGRAM(s) Oral every 6 hours PRN Mild Pain (1 - 3), Moderate Pain (4 - 6)  ALBUTerol    90 MICROgram(s) HFA Inhaler 2 Puff(s) Inhalation every 4 hours PRN Shortness of Breath and/or Wheezing  benzonatate 200 milliGRAM(s) Oral three times a day PRN Cough  clonazePAM  Tablet 0.5 milliGRAM(s) Oral at bedtime PRN anxiety  hydrocodone/homatropine Syrup 5 milliLiter(s) Oral every 6 hours PRN Cough  ondansetron Injectable 4 milliGRAM(s) IV Push every 8 hours PRN Nausea and/or Vomiting  oxyCODONE    IR 5 milliGRAM(s) Oral every 4 hours PRN Severe Pain (7 - 10)      Vitals:  Vital Signs Last 24 Hrs  T(C): 37.1 (03-12-22 @ 05:36), Max: 37.1 (03-12-22 @ 05:36)  T(F): 98.8 (03-12-22 @ 05:36), Max: 98.8 (03-12-22 @ 05:36)  HR: 107 (03-12-22 @ 05:36) (97 - 107)  BP: 100/69 (03-12-22 @ 05:36) (100/69 - 122/83)  BP(mean): --  RR: 18 (03-12-22 @ 05:36) (17 - 18)  SpO2: 97% (03-12-22 @ 05:36) (96% - 98%)        General Exam:   General Appearance: Appropriately dressed and in no acute distress       Head: Normocephalic, atraumatic and no dysmorphic features  Ear, Nose, and Throat: Moist mucous membranes  CVS: S1S2+  Resp: No SOB, no wheeze or rhonchi  Abd: soft NTND  Extremities: No edema, no cyanosis  Skin: No bruises, no rashes     Neurological Exam:  Mental Status: Awake, alert and oriented x 3.  Able to follow simple and complex verbal commands. Able to name and repeat. fluent speech. No obvious aphasia or dysarthria noted.   Cranial Nerves: PERRL, EOMI, VFFC, sensation V1-V3 intact,  no obvious facial asymmetry, equal elevation of palate, scm/trap 5/5, tongue is midline on protrusion. no obvious papilledema on fundoscopic exam. hearing is grossly intact.   Motor: Normal bulk, tone and strength throughout except L weakness in dorsiflexion 3/5 . Fine finger movements were intact and symmetric. no tremors or drift noted.    Sensation: Intact to light touch and pinprick throughout. no right/left confusion. no extinction to tactile on DSS. Romberg was negative.   Reflexes: 1+ throughout at biceps, brachioradialis, triceps, patellars and ankles bilaterally and equal. No clonus. R toe and L toe were both downgoing.  Coordination: No dysmetria on FNF   Gait: deferred      I personally reviewed the below data/images/labs:      CBC Full  -  ( 12 Mar 2022 07:05 )  WBC Count : 11.15 K/uL  RBC Count : 3.47 M/uL  Hemoglobin : 8.8 g/dL  Hematocrit : 28.9 %  Platelet Count - Automated : 360 K/uL  Mean Cell Volume : 83.3 fl  Mean Cell Hemoglobin : 25.4 pg  Mean Cell Hemoglobin Concentration : 30.4 gm/dL  Auto Neutrophil # : x  Auto Lymphocyte # : x  Auto Monocyte # : x  Auto Eosinophil # : x  Auto Basophil # : x  Auto Neutrophil % : x  Auto Lymphocyte % : x  Auto Monocyte % : x  Auto Eosinophil % : x  Auto Basophil % : x    03-11    136  |  102  |  17  ----------------------------<  80  3.9   |  25  |  0.87    Ca    8.6      11 Mar 2022 06:58      ACC: [id number] EXAM:  CT BRAIN                          PROCEDURE DATE:  03/10/2022          INTERPRETATION:  .    CLINICAL INFORMATION: Left-sided foot weakness. Stage IV gastric cancer.    TECHNIQUE: Multiple axial CT images of the head were obtained without   contrast. Sagittal and coronal reconstructed images were acquired from   the source data.    COMPARISON: No prior CT studies of the brain are available for comparison   at this institution.    FINDINGS: There is no acute intracranial hemorrhage, mass effect, shift   of the midline structures, herniation, extra-axial fluid collection, or   hydrocephalus.    There is diffuse cerebral volume loss with prominence of the sulci,   fissures, and cisternal spaces which is normal for the patient's age.   There is minimal deep and periventricular white matter hypoattenuation   statistically compatible with microvascular changes given calcific   atherosclerotic disease of the intracranial arteries.    Scattered mucosal thickening is seen throughout the paranasal sinuses,   and associated in the left sphenoid sinus. The calvarium is intact. The   imaged orbits are unremarkable.    IMPRESSION: No acute intracranial hemorrhage, mass effect, or shift of   the midline structures.    --- End of Report ---            < from: MR Head w/wo IV Cont (03.11.22 @ 22:32) >    ACC: [id number] EXAM:  MR SPINE LUMBAR WA IC                        ACC: [id number] EXAM:  MR BRAIN WAW IC                          PROCEDURE DATE:  03/11/2022          INTERPRETATION:  Clinical indications: Left foot drop. Adenocarcinoma of   the stomach. Staging MRI    MRI of the brain was performed using sagittal T1 axial SPGR T2 T2 FLAIR   diffusion and susceptibility weighted sequence. The patient was injected   with approximately 6 cc gadavist IV with 1.5 cc of contrast discarded.    MRI of the lumbar spine was performed using sagittal T1-T2 and   T2-weighted sequences fat suppression. Axial T1 and T2-weighted sequences   were performed as well. The patient was injected with gadavist IV and   sagittal and axial T1-weighted sequences wereperformed.    This exam is compared with prior head CT performed on March 10, 2022.    Brain:    Parenchymal volume loss is seen.    There is no evidence acute hemorrhage mass, mass effect or abnormal   enhancement seen.    Evaluation of the diffusion weighted sequence demonstrates no abnormal   areas of restricted diffusion to suggest acute infarct.    The large vessels demonstrate normal flow voids    Left sphenoid sinus mucosal thickening is seen. Loss of the normal lumbar   lordosis is seen.    Lumbar spine:    The vertebral body height and alignment appear normal. Disc desiccation   is seen involving the L4-5 and L5-S1 levels secondary to chronic   degenerative change    Modic type II degenerative changes are seen involving the endplates  adjacent to the L4-5 disc which is secondary to chronic degenerative   change    Nonspecific focus of enhancement is seen involving the L5-S1 disc. This   is less likely compatible with underlying infection given the normal   appearance of the adjacent vertebral bodies though continued close   interval follow-up is recommended    T11-12: Normal    T12-L1: Normal    L1-2: Normal    L2-3: Bilateral hypertrophic facet joint changes. No significant   compromise of the right neural foramen.    L3-4:Disc bulge and bilateral hypertrophic facet joint changes. No   significant commas of the spinal canal or either neural foramen.    L4-5: Disc bulge and bilateral hypertrophic facet joint change. No   significant commas of the spinal canal or either neural foramen    L5-S1: Disc bulge and bilateral hypertrophic facet changes. No   significant compromise of the spinal canal or either neural foramen    The conus ends at the bottom of L1 and appears normal    Left-sided renal cyst is seen.    Evaluation of the paraspinal soft tissues appear normal.    IMPRESSION: No evidence of acute hemorrhage mass mass effect or abnormal   enhancement in the posterior fossa supratentorial region.    Evaluation of the lumbar spine demonstrates nonspecific focusof   enhancement involving the L5-S1 disc as described above. Degenerative   changes as described above.        --- End of Report ---            SKY GARDUNO MD; Attending Radiologist  This document has been electronically signed. Mar 12 2022  9:18AM    < end of copied text >

## 2022-03-12 NOTE — PROGRESS NOTE ADULT - ASSESSMENT
Patient is a 62 year old male under care at [facility] Dr. Brad Rojas with stage IV gastric adenocarcinoma (pMMR, HER-2 0+, CPS <1) with mets to the peritoneum. First cycle of chemotherapy was FOLFOX/Nivolumab - patient initially had response but was admitted to [facility] for SOB, cough and found to have progression of disease. CT chest/abdomen/pelvis 3/2/2022 showed significant progression of diease and lymphangitic spread. Patient is also reporting dark bowel movements, strongly concerning for melena.    Patient received one dose of paclitaxel 80 mg/m2 on 3/4/2022. RT consult was placed but he has not yet seen Dr. Forrest of [facility]. He is now at [facility] with ongoing SOB, cough and dark stools.    Stage IV Gastric Cancer  --Under Care by Dr. Brad Rojas at [facility]  --S/P second line of chemotherapy - Paclitaxel 80 mg/m2 - started on 3/4/2022  --Next chemo due 3/11 - hold  --Patient was also being evaluated for palliative RT  --RT consulted for palliative RT as requested by Dr. Rojas (pain and bleeding) - they are requesting GI eval with EGD first  --Palliative consult appreciated for symptom management with persistent cough.    Anemia  --Most likely multifactorial  --Anemia of chronic disease/chemotherapy    Respiratory Distress/SOB/FEVERS  multifocal pna , underlying lymphangitic disease  patient COVID-19 Positive,   Pulmonary recs:  Continue prednisone 30mg PO qd x 2 more days then decrease to 20mg PO qd and continue.  ID recs:  c/w remdesivir    c/w bactrim      Impression L foot Dorsi flexion weakness of unclear etiology  neurology consult appreciated -   MRI brain neg  MRI L spine with non specific enhancement L5/S1  neuro reccommended: AFO brace for left foot ,  repeat MRI LS spine in 6 months   unclear etiology of weakness of left foot      We will continue to follow patient and coordinate with [facility].    Nishi Mosher MD  Hematology/Oncology  [phone] (office)

## 2022-03-12 NOTE — PROGRESS NOTE ADULT - SUBJECTIVE AND OBJECTIVE BOX
Patient is a 62y old  Male who presents with a chief complaint of Cough/SOB (12 Mar 2022 09:55)      MEDICATIONS  (STANDING):  chlorhexidine 2% Cloths 1 Application(s) Topical daily  enoxaparin Injectable 40 milliGRAM(s) SubCutaneous every 12 hours  losartan 100 milliGRAM(s) Oral daily  pantoprazole    Tablet 40 milliGRAM(s) Oral before breakfast  polyethylene glycol 3350 17 Gram(s) Oral daily  predniSONE   Tablet 30 milliGRAM(s) Oral daily  remdesivir  IVPB   IV Intermittent   remdesivir  IVPB 100 milliGRAM(s) IV Intermittent every 24 hours  senna 2 Tablet(s) Oral at bedtime  trimethoprim  160 mG/sulfamethoxazole 800 mG 1 Tablet(s) Oral <User Schedule>    MEDICATIONS  (PRN):  acetaminophen     Tablet .. 650 milliGRAM(s) Oral every 4 hours PRN Temp greater or equal to 38.5C (101.3F)  acetaminophen     Tablet .. 650 milliGRAM(s) Oral every 6 hours PRN Mild Pain (1 - 3), Moderate Pain (4 - 6)  ALBUTerol    90 MICROgram(s) HFA Inhaler 2 Puff(s) Inhalation every 4 hours PRN Shortness of Breath and/or Wheezing  benzonatate 200 milliGRAM(s) Oral three times a day PRN Cough  clonazePAM  Tablet 0.5 milliGRAM(s) Oral at bedtime PRN anxiety  hydrocodone/homatropine Syrup 5 milliLiter(s) Oral every 6 hours PRN Cough  ondansetron Injectable 4 milliGRAM(s) IV Push every 8 hours PRN Nausea and/or Vomiting  oxyCODONE    IR 5 milliGRAM(s) Oral every 4 hours PRN Severe Pain (7 - 10)      ROS  No fever, sweats, chills  No epistaxis, HA, sore throat  No CP, SOB, cough, sputum  No n/v/d, abd pain, melena, hematochezia  No edema  No rash  No anxiety  No back pain, joint pain  No bleeding, bruising  No dysuria, hematuria    Vital Signs Last 24 Hrs  T(C): 37.1 (12 Mar 2022 05:36), Max: 37.1 (12 Mar 2022 05:36)  T(F): 98.8 (12 Mar 2022 05:36), Max: 98.8 (12 Mar 2022 05:36)  HR: 107 (12 Mar 2022 05:36) (97 - 107)  BP: 100/69 (12 Mar 2022 05:36) (100/69 - 122/83)  BP(mean): --  RR: 18 (12 Mar 2022 05:36) (17 - 18)  SpO2: 97% (12 Mar 2022 05:36) (96% - 98%)    PE  NAD  Awake, alert  Anicteric, MMM  RRR  CTAB  Abd soft, NT, ND  No c/c/e  No rash grossly  FROM                          8.8    11.15 )-----------( 360      ( 12 Mar 2022 07:05 )             28.9       03-11    136  |  102  |  17  ----------------------------<  80  3.9   |  25  |  0.87    Ca    8.6      11 Mar 2022 06:58

## 2022-03-12 NOTE — PROGRESS NOTE ADULT - PROBLEM SELECTOR PLAN 2
-[facility]  -s/p second line chemotherapy Paclitaxel - started on 3/4/2022  -c/w home pain regimen oxycodone IR 5mg q4h PRN  -bowel regimen   -heme onc f/u    left foot drop - head ct neg , ? sec to chemo vs neurovascular pathology   - neuro f/u

## 2022-03-12 NOTE — PROGRESS NOTE ADULT - PROBLEM SELECTOR PLAN 1
CTA chest with conglomerate lymphadenopathy in the mediastinal along with thickening of the bronchovascular interstitium suggestive of lymphangitic spread  -Pt normoxic but with persistent cough, SOB. Improving with Hycodan and steroids.   -Continue hycodan 5cc q6h (hold for sedation).   -Pt c/o increased cough especially at night, start codeine 15mg PO q6h (hold for sedation. Please give pt rx for Hycodan and codeine on discharge.  -Continue prednisone 30mg PO qd x 1 more day then decrease to 20mg PO qd and continue.  -Continue Protonix 40mg PO qd while on steroids   -Bactrim for PCP ppx as per ID  -F/u in office. CTA chest with conglomerate lymphadenopathy in the mediastinal along with thickening of the bronchovascular interstitium suggestive of lymphangitic spread  -Pt normoxic but with persistent cough, SOB. Cough had been improving with Hycodan and steroids, now with increased cough again..   -Continue hycodan 5cc q6h (hold for sedation).   -Start codeine 15mg PO q6h (hold for sedation).   -Increase prednisone to 30mg PO BID.  -Continue Protonix 40mg PO qd while on steroids   -Bactrim for PCP ppx as per ID  -F/u in office.

## 2022-03-13 LAB
ANION GAP SERPL CALC-SCNC: 12 MMOL/L — SIGNIFICANT CHANGE UP (ref 5–17)
BUN SERPL-MCNC: 16 MG/DL — SIGNIFICANT CHANGE UP (ref 7–23)
CALCIUM SERPL-MCNC: 8.7 MG/DL — SIGNIFICANT CHANGE UP (ref 8.4–10.5)
CHLORIDE SERPL-SCNC: 100 MMOL/L — SIGNIFICANT CHANGE UP (ref 96–108)
CO2 SERPL-SCNC: 25 MMOL/L — SIGNIFICANT CHANGE UP (ref 22–31)
CREAT SERPL-MCNC: 0.81 MG/DL — SIGNIFICANT CHANGE UP (ref 0.5–1.3)
EGFR: 101 ML/MIN/1.73M2 — SIGNIFICANT CHANGE UP
GLUCOSE SERPL-MCNC: 100 MG/DL — HIGH (ref 70–99)
HCT VFR BLD CALC: 26.9 % — LOW (ref 39–50)
HGB BLD-MCNC: 8.3 G/DL — LOW (ref 13–17)
MCHC RBC-ENTMCNC: 25.5 PG — LOW (ref 27–34)
MCHC RBC-ENTMCNC: 30.9 GM/DL — LOW (ref 32–36)
MCV RBC AUTO: 82.5 FL — SIGNIFICANT CHANGE UP (ref 80–100)
NRBC # BLD: 0 /100 WBCS — SIGNIFICANT CHANGE UP (ref 0–0)
PLATELET # BLD AUTO: 309 K/UL — SIGNIFICANT CHANGE UP (ref 150–400)
POTASSIUM SERPL-MCNC: 4.1 MMOL/L — SIGNIFICANT CHANGE UP (ref 3.5–5.3)
POTASSIUM SERPL-SCNC: 4.1 MMOL/L — SIGNIFICANT CHANGE UP (ref 3.5–5.3)
RBC # BLD: 3.26 M/UL — LOW (ref 4.2–5.8)
RBC # FLD: 30.2 % — HIGH (ref 10.3–14.5)
SODIUM SERPL-SCNC: 137 MMOL/L — SIGNIFICANT CHANGE UP (ref 135–145)
WBC # BLD: 11.08 K/UL — HIGH (ref 3.8–10.5)
WBC # FLD AUTO: 11.08 K/UL — HIGH (ref 3.8–10.5)

## 2022-03-13 PROCEDURE — 99232 SBSQ HOSP IP/OBS MODERATE 35: CPT

## 2022-03-13 RX ADMIN — CODEINE SULFATE 15 MILLIGRAM(S): 60 TABLET ORAL at 15:37

## 2022-03-13 RX ADMIN — Medication 30 MILLIGRAM(S): at 18:33

## 2022-03-13 RX ADMIN — CHLORHEXIDINE GLUCONATE 1 APPLICATION(S): 213 SOLUTION TOPICAL at 13:01

## 2022-03-13 RX ADMIN — SENNA PLUS 2 TABLET(S): 8.6 TABLET ORAL at 21:35

## 2022-03-13 RX ADMIN — CODEINE SULFATE 15 MILLIGRAM(S): 60 TABLET ORAL at 05:28

## 2022-03-13 RX ADMIN — Medication 30 MILLIGRAM(S): at 05:27

## 2022-03-13 RX ADMIN — PANTOPRAZOLE SODIUM 40 MILLIGRAM(S): 20 TABLET, DELAYED RELEASE ORAL at 05:27

## 2022-03-13 RX ADMIN — LOSARTAN POTASSIUM 100 MILLIGRAM(S): 100 TABLET, FILM COATED ORAL at 05:27

## 2022-03-13 RX ADMIN — ENOXAPARIN SODIUM 40 MILLIGRAM(S): 100 INJECTION SUBCUTANEOUS at 05:28

## 2022-03-13 RX ADMIN — ENOXAPARIN SODIUM 40 MILLIGRAM(S): 100 INJECTION SUBCUTANEOUS at 18:33

## 2022-03-13 RX ADMIN — CODEINE SULFATE 15 MILLIGRAM(S): 60 TABLET ORAL at 21:35

## 2022-03-13 NOTE — PROGRESS NOTE ADULT - PROBLEM SELECTOR PROBLEM 4
Hypertension
Hypertension
Gastric adenocarcinoma
Hypertension

## 2022-03-13 NOTE — PROGRESS NOTE ADULT - ASSESSMENT
Patient is a 62 year old male under care at [facility] Dr. Brad Rojas with stage IV gastric adenocarcinoma (pMMR, HER-2 0+, CPS <1) with mets to the peritoneum. First cycle of chemotherapy was FOLFOX/Nivolumab - patient initially had response but was admitted to [facility] for SOB, cough and found to have progression of disease. CT chest/abdomen/pelvis 3/2/2022 showed significant progression of diease and lymphangitic spread. Patient is also reporting dark bowel movements, strongly concerning for melena.    Patient received one dose of paclitaxel 80 mg/m2 on 3/4/2022. RT consult was placed but he has not yet seen Dr. Forrest of [facility]. He is now at [facility] with ongoing SOB, cough and dark stools.    Stage IV Gastric Cancer  --Under Care by Dr. Brad Rojas at [facility]  --S/P second line of chemotherapy - Paclitaxel 80 mg/m2 - started on 3/4/2022  --Next chemo due 3/11 - hold  --Patient was also being evaluated for palliative RT  --RT consulted for palliative RT as requested by Dr. Rojas (pain and bleeding) - they are requesting GI eval with EGD first  --Palliative consult appreciated for symptom management with persistent cough.    Anemia  --Most likely multifactorial  --Anemia of chronic disease/chemotherapy  -- 8.3 today     Respiratory Distress/SOB/FEVERS  multifocal pna , underlying lymphangitic disease  patient COVID-19 Positive,   Pulmonary recs:  Continue prednisone 30mg PO qd x 2 more days then decrease to 20mg PO qd and continue.  ID recs:  c/w remdesivir    c/w bactrim      Impression L foot Dorsi flexion weakness of unclear etiology  neurology consult appreciated -   MRI brain neg  MRI L spine with non specific enhancement L5/S1  neuro reccommended: AFO brace for left foot ,  repeat MRI LS spine in 6 months   unclear etiology of weakness of left foot      We will continue to follow patient and coordinate with [facility].      Delbert Sahu MD  HematologyOncology   O: [phone]

## 2022-03-13 NOTE — PROGRESS NOTE ADULT - ASSESSMENT
61yo M w/  Stage IV gastric adenocarcinoma (dx 11/2021) w/ mets to peritoneum, HTN, Hx of PE (s/p Lovenox & IVC filter) presents for cough/SOB, found to be COVID +, CTA chest negative for PE. neuro called for L foot weakness.   o/e L foot weakness DF.   CTH neg  dimer 2800  MRI brain neg  MRI L spine with non specific enhancement L5/S1; f/u in 6 months recommended     Impression L foot Dorsi flexion weakness of unclear etiology;  chemo can cause neuropathy not weakness    - for covid, getting remdesivir until 3/12  and steroids   - get AFO brace for left foot ; seen by ortho for AFO brace   - c/f melana.   - palliaitve eval  - c/w covid airborne and contact precautions   - possible palliative RT  - repeat MRI L spine in 6 months   - telemetry  - PT/OT/SS/SLP, OOBC  - check FS, glucose control <180  - further recs based on above  - can consider outpatient EMG/NCS   - GI/DVT ppx  - Thank you for allowing me to participate in the care of this patient. Call with questions.   - no further neuro workup at this time   Red Novak MD  Vascular Neurology  Office: [phone]

## 2022-03-13 NOTE — PROGRESS NOTE ADULT - SUBJECTIVE AND OBJECTIVE BOX
[facility] -- Kanchan Meehan, David, Brenda, Michael Rodgers Savella  Office # [phone]      Follow Up:  chest pain    Subjective/Observations: Patient seen and examined. Events noted. Resting comfortably in bed. No complaints of   dyspnea, or palpitations reported. No signs of orthopnea or PND. + cough and pleuritic pain       REVIEW OF SYSTEMS: All other review of systems is negative unless indicated above    PAST MEDICAL & SURGICAL HISTORY:  Gastric adenocarcinoma  stage 4    Hypertension    S/P IVC filter    Anorectal abscess        MEDICATIONS  (STANDING):  chlorhexidine 2% Cloths 1 Application(s) Topical daily  codeine 15 milliGRAM(s) Oral every 6 hours  enoxaparin Injectable 40 milliGRAM(s) SubCutaneous every 12 hours  hydrocodone/homatropine Syrup 5 milliLiter(s) Oral every 6 hours  losartan 100 milliGRAM(s) Oral daily  pantoprazole    Tablet 40 milliGRAM(s) Oral before breakfast  polyethylene glycol 3350 17 Gram(s) Oral daily  predniSONE   Tablet 30 milliGRAM(s) Oral two times a day  senna 2 Tablet(s) Oral at bedtime  trimethoprim  160 mG/sulfamethoxazole 800 mG 1 Tablet(s) Oral <User Schedule>    MEDICATIONS  (PRN):  acetaminophen     Tablet .. 650 milliGRAM(s) Oral every 4 hours PRN Temp greater or equal to 38.5C (101.3F)  acetaminophen     Tablet .. 650 milliGRAM(s) Oral every 6 hours PRN Mild Pain (1 - 3), Moderate Pain (4 - 6)  ALBUTerol    90 MICROgram(s) HFA Inhaler 2 Puff(s) Inhalation every 4 hours PRN Shortness of Breath and/or Wheezing  benzonatate 200 milliGRAM(s) Oral three times a day PRN Cough  clonazePAM  Tablet 0.5 milliGRAM(s) Oral at bedtime PRN anxiety  ondansetron Injectable 4 milliGRAM(s) IV Push every 8 hours PRN Nausea and/or Vomiting  oxyCODONE    IR 5 milliGRAM(s) Oral every 4 hours PRN Severe Pain (7 - 10)      Allergies    No Known Allergies    Intolerances            Vital Signs Last 24 Hrs  T(C): 36.6 (13 Mar 2022 12:33), Max: 36.8 (13 Mar 2022 05:28)  T(F): 97.9 (13 Mar 2022 12:33), Max: 98.2 (13 Mar 2022 05:28)  HR: 97 (13 Mar 2022 12:33) (83 - 97)  BP: 120/72 (13 Mar 2022 12:33) (104/68 - 120/72)  BP(mean): --  RR: 18 (13 Mar 2022 12:33) (17 - 18)  SpO2: 99% (13 Mar 2022 12:33) (94% - 99%)    I&O's Summary    12 Mar 2022 06:01  -  13 Mar 2022 07:00  --------------------------------------------------------  IN: 0 mL / OUT: 400 mL / NET: -400 mL          PHYSICAL EXAM:  TELE:   Constitutional: NAD, awake and alert, well-developed  HEENT: Moist Mucous Membranes, Anicteric  Pulmonary: Non-labored, breath sounds are clear bilaterally, No wheezing, rales or rhonchi  Cardiovascular: Regular, S1 and S2, No murmurs, rubs, gallops or clicks  Gastrointestinal: Bowel Sounds present, soft, nontender.   Lymph: No peripheral edema. No lymphadenopathy.  Skin: No visible rashes or ulcers.  Psych:  Mood & affect appropriate for situation    LABS: All Labs Reviewed:                        8.3    11.08 )-----------( 309      ( 13 Mar 2022 07:02 )             26.9                         8.8    11.15 )-----------( 360      ( 12 Mar 2022 07:05 )             28.9                         7.9    7.71  )-----------( 404      ( 11 Mar 2022 07:03 )             25.3     13 Mar 2022 07:02    137    |  100    |  16     ----------------------------<  100    4.1     |  25     |  0.81   11 Mar 2022 06:58    136    |  102    |  17     ----------------------------<  80     3.9     |  25     |  0.87     Ca    8.7        13 Mar 2022 07:02  Ca    8.6        11 Mar 2022 06:58

## 2022-03-13 NOTE — PROGRESS NOTE ADULT - PROBLEM SELECTOR PLAN 2
-[facility]  -s/p second line chemotherapy Paclitaxel - started on 3/4/2022  -c/w home pain regimen oxycodone IR 5mg q4h PRN  -bowel regimen   -heme onc f/u    left foot drop - head ct neg , ? sec to chemo vs neurovascular pathology   - neuro f/u

## 2022-03-13 NOTE — PROGRESS NOTE ADULT - SUBJECTIVE AND OBJECTIVE BOX
Neurology Progress Note    S: Patient seen and examined in covid unit. No new events overnight. patient denied  HA. c/o CP from cough. seen by ortho for AFO brace     Medication:  MEDICATIONS  (STANDING):  chlorhexidine 2% Cloths 1 Application(s) Topical daily  codeine 15 milliGRAM(s) Oral every 6 hours  enoxaparin Injectable 40 milliGRAM(s) SubCutaneous every 12 hours  hydrocodone/homatropine Syrup 5 milliLiter(s) Oral every 6 hours  losartan 100 milliGRAM(s) Oral daily  pantoprazole    Tablet 40 milliGRAM(s) Oral before breakfast  polyethylene glycol 3350 17 Gram(s) Oral daily  predniSONE   Tablet 30 milliGRAM(s) Oral two times a day  senna 2 Tablet(s) Oral at bedtime  trimethoprim  160 mG/sulfamethoxazole 800 mG 1 Tablet(s) Oral <User Schedule>    MEDICATIONS  (PRN):  acetaminophen     Tablet .. 650 milliGRAM(s) Oral every 4 hours PRN Temp greater or equal to 38.5C (101.3F)  acetaminophen     Tablet .. 650 milliGRAM(s) Oral every 6 hours PRN Mild Pain (1 - 3), Moderate Pain (4 - 6)  ALBUTerol    90 MICROgram(s) HFA Inhaler 2 Puff(s) Inhalation every 4 hours PRN Shortness of Breath and/or Wheezing  benzonatate 200 milliGRAM(s) Oral three times a day PRN Cough  clonazePAM  Tablet 0.5 milliGRAM(s) Oral at bedtime PRN anxiety  ondansetron Injectable 4 milliGRAM(s) IV Push every 8 hours PRN Nausea and/or Vomiting  oxyCODONE    IR 5 milliGRAM(s) Oral every 4 hours PRN Severe Pain (7 - 10)      Vitals:  Vital Signs Last 24 Hrs  T(C): 36.8 (03-13-22 @ 05:28), Max: 36.8 (03-12-22 @ 12:34)  T(F): 98.2 (03-13-22 @ 05:28), Max: 98.2 (03-12-22 @ 12:34)  HR: 83 (03-13-22 @ 05:28) (83 - 99)  BP: 104/68 (03-13-22 @ 05:28) (104/68 - 113/74)  BP(mean): --  RR: 17 (03-13-22 @ 05:28) (17 - 18)  SpO2: 94% (03-13-22 @ 05:28) (94% - 96%)          General Exam:   General Appearance: Appropriately dressed and in no acute distress       Head: Normocephalic, atraumatic and no dysmorphic features  Ear, Nose, and Throat: Moist mucous membranes  CVS: S1S2+  Resp: No SOB, no wheeze or rhonchi  Abd: soft NTND  Extremities: No edema, no cyanosis  Skin: No bruises, no rashes     Neurological Exam:  Mental Status: Awake, alert and oriented x 3.  Able to follow simple and complex verbal commands. Able to name and repeat. fluent speech. No obvious aphasia or dysarthria noted.   Cranial Nerves: PERRL, EOMI, VFFC, sensation V1-V3 intact,  no obvious facial asymmetry, equal elevation of palate, scm/trap 5/5, tongue is midline on protrusion. no obvious papilledema on fundoscopic exam. hearing is grossly intact.   Motor: Normal bulk, tone and strength throughout except L weakness in dorsiflexion 3/5 . Fine finger movements were intact and symmetric. no tremors or drift noted.    Sensation: Intact to light touch and pinprick throughout. no right/left confusion. no extinction to tactile on DSS. Romberg was negative.   Reflexes: 1+ throughout at biceps, brachioradialis, triceps, patellars and ankles bilaterally and equal. No clonus. R toe and L toe were both downgoing.  Coordination: No dysmetria on FNF   Gait: deferred      I personally reviewed the below data/images/labs:    CBC Full  -  ( 13 Mar 2022 07:02 )  WBC Count : 11.08 K/uL  RBC Count : 3.26 M/uL  Hemoglobin : 8.3 g/dL  Hematocrit : 26.9 %  Platelet Count - Automated : 309 K/uL  Mean Cell Volume : 82.5 fl  Mean Cell Hemoglobin : 25.5 pg  Mean Cell Hemoglobin Concentration : 30.9 gm/dL  Auto Neutrophil # : x  Auto Lymphocyte # : x  Auto Monocyte # : x  Auto Eosinophil # : x  Auto Basophil # : x  Auto Neutrophil % : x  Auto Lymphocyte % : x  Auto Monocyte % : x  Auto Eosinophil % : x  Auto Basophil % : x    03-13    137  |  100  |  16  ----------------------------<  100<H>  4.1   |  25  |  0.81    Ca    8.7      13 Mar 2022 07:02        ACC: [id number] EXAM:  CT BRAIN                          PROCEDURE DATE:  03/10/2022          INTERPRETATION:  .    CLINICAL INFORMATION: Left-sided foot weakness. Stage IV gastric cancer.    TECHNIQUE: Multiple axial CT images of the head were obtained without   contrast. Sagittal and coronal reconstructed images were acquired from   the source data.    COMPARISON: No prior CT studies of the brain are available for comparison   at this institution.    FINDINGS: There is no acute intracranial hemorrhage, mass effect, shift   of the midline structures, herniation, extra-axial fluid collection, or   hydrocephalus.    There is diffuse cerebral volume loss with prominence of the sulci,   fissures, and cisternal spaces which is normal for the patient's age.   There is minimal deep and periventricular white matter hypoattenuation   statistically compatible with microvascular changes given calcific   atherosclerotic disease of the intracranial arteries.    Scattered mucosal thickening is seen throughout the paranasal sinuses,   and associated in the left sphenoid sinus. The calvarium is intact. The   imaged orbits are unremarkable.    IMPRESSION: No acute intracranial hemorrhage, mass effect, or shift of   the midline structures.    --- End of Report ---            < from: MR Head w/wo IV Cont (03.11.22 @ 22:32) >    ACC: [id number] EXAM:  MR SPINE LUMBAR WAW                         ACC: [id number] EXAM:  MR BRAIN WAW IC                          PROCEDURE DATE:  03/11/2022          INTERPRETATION:  Clinical indications: Left foot drop. Adenocarcinoma of   the stomach. Staging MRI    MRI of the brain was performed using sagittal T1 axial SPGR T2 T2 FLAIR   diffusion and susceptibility weighted sequence. The patient was injected   with approximately 6 cc gadavist IV with 1.5 cc of contrast discarded.    MRI of the lumbar spine was performed using sagittal T1-T2 and   T2-weighted sequences fat suppression. Axial T1 and T2-weighted sequences   were performed as well. The patient was injected with gadavist IV and   sagittal and axial T1-weighted sequences wereperformed.    This exam is compared with prior head CT performed on March 10, 2022.    Brain:    Parenchymal volume loss is seen.    There is no evidence acute hemorrhage mass, mass effect or abnormal   enhancement seen.    Evaluation of the diffusion weighted sequence demonstrates no abnormal   areas of restricted diffusion to suggest acute infarct.    The large vessels demonstrate normal flow voids    Left sphenoid sinus mucosal thickening is seen. Loss of the normal lumbar   lordosis is seen.    Lumbar spine:    The vertebral body height and alignment appear normal. Disc desiccation   is seen involving the L4-5 and L5-S1 levels secondary to chronic   degenerative change    Modic type II degenerative changes are seen involving the endplates  adjacent to the L4-5 disc which is secondary to chronic degenerative   change    Nonspecific focus of enhancement is seen involving the L5-S1 disc. This   is less likely compatible with underlying infection given the normal   appearance of the adjacent vertebral bodies though continued close   interval follow-up is recommended    T11-12: Normal    T12-L1: Normal    L1-2: Normal    L2-3: Bilateral hypertrophic facet joint changes. No significant   compromise of the right neural foramen.    L3-4:Disc bulge and bilateral hypertrophic facet joint changes. No   significant commas of the spinal canal or either neural foramen.    L4-5: Disc bulge and bilateral hypertrophic facet joint change. No   significant commas of the spinal canal or either neural foramen    L5-S1: Disc bulge and bilateral hypertrophic facet changes. No   significant compromise of the spinal canal or either neural foramen    The conus ends at the bottom of L1 and appears normal    Left-sided renal cyst is seen.    Evaluation of the paraspinal soft tissues appear normal.    IMPRESSION: No evidence of acute hemorrhage mass mass effect or abnormal   enhancement in the posterior fossa supratentorial region.    Evaluation of the lumbar spine demonstrates nonspecific focusof   enhancement involving the L5-S1 disc as described above. Degenerative   changes as described above.        --- End of Report ---            SKY GARDUNO MD; Attending Radiologist  This document has been electronically signed. Mar 12 2022  9:18AM    < end of copied text >

## 2022-03-13 NOTE — PROGRESS NOTE ADULT - SUBJECTIVE AND OBJECTIVE BOX
Patient seen and examined at bedside. has some mild abd pain. has some cough     MEDICATIONS  (STANDING):  chlorhexidine 2% Cloths 1 Application(s) Topical daily  codeine 15 milliGRAM(s) Oral every 6 hours  enoxaparin Injectable 40 milliGRAM(s) SubCutaneous every 12 hours  hydrocodone/homatropine Syrup 5 milliLiter(s) Oral every 6 hours  losartan 100 milliGRAM(s) Oral daily  pantoprazole    Tablet 40 milliGRAM(s) Oral before breakfast  polyethylene glycol 3350 17 Gram(s) Oral daily  predniSONE   Tablet 30 milliGRAM(s) Oral two times a day  senna 2 Tablet(s) Oral at bedtime  trimethoprim  160 mG/sulfamethoxazole 800 mG 1 Tablet(s) Oral <User Schedule>    MEDICATIONS  (PRN):  acetaminophen     Tablet .. 650 milliGRAM(s) Oral every 4 hours PRN Temp greater or equal to 38.5C (101.3F)  acetaminophen     Tablet .. 650 milliGRAM(s) Oral every 6 hours PRN Mild Pain (1 - 3), Moderate Pain (4 - 6)  ALBUTerol    90 MICROgram(s) HFA Inhaler 2 Puff(s) Inhalation every 4 hours PRN Shortness of Breath and/or Wheezing  benzonatate 200 milliGRAM(s) Oral three times a day PRN Cough  clonazePAM  Tablet 0.5 milliGRAM(s) Oral at bedtime PRN anxiety  ondansetron Injectable 4 milliGRAM(s) IV Push every 8 hours PRN Nausea and/or Vomiting  oxyCODONE    IR 5 milliGRAM(s) Oral every 4 hours PRN Severe Pain (7 - 10)        Vital Signs Last 24 Hrs  T(C): 36.6 (13 Mar 2022 12:33), Max: 36.8 (13 Mar 2022 05:28)  T(F): 97.9 (13 Mar 2022 12:33), Max: 98.2 (13 Mar 2022 05:28)  HR: 97 (13 Mar 2022 12:33) (83 - 97)  BP: 120/72 (13 Mar 2022 12:33) (104/68 - 120/72)  BP(mean): --  RR: 18 (13 Mar 2022 12:33) (17 - 18)  SpO2: 99% (13 Mar 2022 12:33) (94% - 99%)      PHYSICAL EXAM:     GENERAL:  Appears stated age, well-groomed  HEENT:  NC/AT,  conjunctivae clear and pink  ABDOMEN:  Soft, non-tender,  EXTEREMITIES: + left foot brace   SKIN:  No rash/erythema/ecchymoses  NEURO:  Alert, oriented, no asterixis                            8.3    11.08 )-----------( 309      ( 13 Mar 2022 07:02 )             26.9       03-13    137  |  100  |  16  ----------------------------<  100<H>  4.1   |  25  |  0.81    Ca    8.7      13 Mar 2022 07:02

## 2022-03-13 NOTE — PROGRESS NOTE ADULT - SUBJECTIVE AND OBJECTIVE BOX
SUBJECTIVE / OVERNIGHT EVENTS:pt seen and examined  22     MEDICATIONS  (STANDING):  chlorhexidine 2% Cloths 1 Application(s) Topical daily  codeine 15 milliGRAM(s) Oral every 6 hours  enoxaparin Injectable 40 milliGRAM(s) SubCutaneous every 12 hours  hydrocodone/homatropine Syrup 5 milliLiter(s) Oral every 6 hours  losartan 100 milliGRAM(s) Oral daily  pantoprazole    Tablet 40 milliGRAM(s) Oral before breakfast  polyethylene glycol 3350 17 Gram(s) Oral daily  predniSONE   Tablet 30 milliGRAM(s) Oral two times a day  senna 2 Tablet(s) Oral at bedtime  trimethoprim  160 mG/sulfamethoxazole 800 mG 1 Tablet(s) Oral <User Schedule>    MEDICATIONS  (PRN):  acetaminophen     Tablet .. 650 milliGRAM(s) Oral every 4 hours PRN Temp greater or equal to 38.5C (101.3F)  acetaminophen     Tablet .. 650 milliGRAM(s) Oral every 6 hours PRN Mild Pain (1 - 3), Moderate Pain (4 - 6)  ALBUTerol    90 MICROgram(s) HFA Inhaler 2 Puff(s) Inhalation every 4 hours PRN Shortness of Breath and/or Wheezing  benzonatate 200 milliGRAM(s) Oral three times a day PRN Cough  clonazePAM  Tablet 0.5 milliGRAM(s) Oral at bedtime PRN anxiety  ondansetron Injectable 4 milliGRAM(s) IV Push every 8 hours PRN Nausea and/or Vomiting  oxyCODONE    IR 5 milliGRAM(s) Oral every 4 hours PRN Severe Pain (7 - 10)    Vital Signs Last 24 Hrs  T(C): 36.7 (22 @ 17:56), Max: 36.8 (22 @ 05:28)  T(F): 98.1 (22 @ 17:56), Max: 98.2 (22 @ 05:28)  HR: 91 (22 @ 17:56) (83 - 97)  BP: 125/78 (22 @ 17:56) (104/68 - 125/78)  BP(mean): --  RR: 18 (22 @ 17:56) (17 - 18)  SpO2: 98% (22 @ 17:56) (94% - 99%)            Constitutional: No fever, fatigue  Skin: No rash.  Eyes: No recent vision problems or eye pain.  ENT: No congestion, ear pain, or sore throat.  Cardiovascular: No chest pain or palpation.  Respiratory: No cough, shortness of breath, congestion, or wheezing.  Gastrointestinal: No abdominal pain, nausea, vomiting, or diarrhea.  Genitourinary: No dysuria.  Musculoskeletal: No joint swelling.left foot drop  Neurologic: No headache.    PHYSICAL EXAM:  GENERAL: NAD  EYES: EOMI, PERRLA  NECK: Supple, No JVD  CHEST/LUNG: dec breath sounds at bases   HEART:  S1 , S2 +  ABDOMEN: soft , bs+  EXTREMITIES:  trace edema  NEUROLOGY:alert awake oriented    LABS:      137  |  100  |  16  ----------------------------<  100<H>  4.1   |  25  |  0.81    Ca    8.7      13 Mar 2022 07:02      Creatinine Trend: 0.81 <--, 0.87 <--, 0.82 <--, 0.80 <--, 0.78 <--, 0.80 <--                        8.3    11.08 )-----------( 309      ( 13 Mar 2022 07:02 )             26.9     Urine Studies:  Urinalysis Basic - ( 07 Mar 2022 19:26 )    Color: Colorless / Appearance: Clear / S.027 / pH:   Gluc:  / Ketone: Negative  / Bili: Negative / Urobili: Negative   Blood:  / Protein: Negative / Nitrite: Negative   Leuk Esterase: Negative / RBC: 1 /hpf / WBC 0 /HPF   Sq Epi:  / Non Sq Epi: 0 /hpf / Bacteria: Negative

## 2022-03-13 NOTE — PROGRESS NOTE ADULT - PROBLEM SELECTOR PROBLEM 5
Need for prophylactic measure
Palliative care encounter
Need for prophylactic measure

## 2022-03-14 ENCOUNTER — TRANSCRIPTION ENCOUNTER (OUTPATIENT)
Age: 62
End: 2022-03-14

## 2022-03-14 VITALS
SYSTOLIC BLOOD PRESSURE: 124 MMHG | TEMPERATURE: 98 F | RESPIRATION RATE: 18 BRPM | DIASTOLIC BLOOD PRESSURE: 87 MMHG | HEART RATE: 94 BPM | OXYGEN SATURATION: 97 %

## 2022-03-14 PROCEDURE — 83605 ASSAY OF LACTIC ACID: CPT

## 2022-03-14 PROCEDURE — U0003: CPT

## 2022-03-14 PROCEDURE — 71045 X-RAY EXAM CHEST 1 VIEW: CPT

## 2022-03-14 PROCEDURE — 85379 FIBRIN DEGRADATION QUANT: CPT

## 2022-03-14 PROCEDURE — 83550 IRON BINDING TEST: CPT

## 2022-03-14 PROCEDURE — 86850 RBC ANTIBODY SCREEN: CPT

## 2022-03-14 PROCEDURE — 96374 THER/PROPH/DIAG INJ IV PUSH: CPT

## 2022-03-14 PROCEDURE — 93306 TTE W/DOPPLER COMPLETE: CPT | Mod: 26

## 2022-03-14 PROCEDURE — 70553 MRI BRAIN STEM W/O & W/DYE: CPT

## 2022-03-14 PROCEDURE — 87040 BLOOD CULTURE FOR BACTERIA: CPT

## 2022-03-14 PROCEDURE — 82947 ASSAY GLUCOSE BLOOD QUANT: CPT

## 2022-03-14 PROCEDURE — U0005: CPT

## 2022-03-14 PROCEDURE — 84132 ASSAY OF SERUM POTASSIUM: CPT

## 2022-03-14 PROCEDURE — 83880 ASSAY OF NATRIURETIC PEPTIDE: CPT

## 2022-03-14 PROCEDURE — 82803 BLOOD GASES ANY COMBINATION: CPT

## 2022-03-14 PROCEDURE — 93970 EXTREMITY STUDY: CPT

## 2022-03-14 PROCEDURE — 99285 EMERGENCY DEPT VISIT HI MDM: CPT | Mod: 25

## 2022-03-14 PROCEDURE — 82330 ASSAY OF CALCIUM: CPT

## 2022-03-14 PROCEDURE — 85018 HEMOGLOBIN: CPT

## 2022-03-14 PROCEDURE — 82728 ASSAY OF FERRITIN: CPT

## 2022-03-14 PROCEDURE — 71275 CT ANGIOGRAPHY CHEST: CPT | Mod: MA

## 2022-03-14 PROCEDURE — 85027 COMPLETE CBC AUTOMATED: CPT

## 2022-03-14 PROCEDURE — 86900 BLOOD TYPING SEROLOGIC ABO: CPT

## 2022-03-14 PROCEDURE — 36415 COLL VENOUS BLD VENIPUNCTURE: CPT

## 2022-03-14 PROCEDURE — 94640 AIRWAY INHALATION TREATMENT: CPT

## 2022-03-14 PROCEDURE — 85025 COMPLETE CBC W/AUTO DIFF WBC: CPT

## 2022-03-14 PROCEDURE — 70450 CT HEAD/BRAIN W/O DYE: CPT

## 2022-03-14 PROCEDURE — 96375 TX/PRO/DX INJ NEW DRUG ADDON: CPT

## 2022-03-14 PROCEDURE — 99232 SBSQ HOSP IP/OBS MODERATE 35: CPT

## 2022-03-14 PROCEDURE — 82565 ASSAY OF CREATININE: CPT

## 2022-03-14 PROCEDURE — 86901 BLOOD TYPING SEROLOGIC RH(D): CPT

## 2022-03-14 PROCEDURE — 87086 URINE CULTURE/COLONY COUNT: CPT

## 2022-03-14 PROCEDURE — 84484 ASSAY OF TROPONIN QUANT: CPT

## 2022-03-14 PROCEDURE — 72158 MRI LUMBAR SPINE W/O & W/DYE: CPT

## 2022-03-14 PROCEDURE — 86803 HEPATITIS C AB TEST: CPT

## 2022-03-14 PROCEDURE — 82435 ASSAY OF BLOOD CHLORIDE: CPT

## 2022-03-14 PROCEDURE — 85014 HEMATOCRIT: CPT

## 2022-03-14 PROCEDURE — 84295 ASSAY OF SERUM SODIUM: CPT

## 2022-03-14 PROCEDURE — 80053 COMPREHEN METABOLIC PANEL: CPT

## 2022-03-14 PROCEDURE — 82272 OCCULT BLD FECES 1-3 TESTS: CPT

## 2022-03-14 PROCEDURE — 83735 ASSAY OF MAGNESIUM: CPT

## 2022-03-14 PROCEDURE — 93306 TTE W/DOPPLER COMPLETE: CPT

## 2022-03-14 PROCEDURE — 80048 BASIC METABOLIC PNL TOTAL CA: CPT

## 2022-03-14 PROCEDURE — 83540 ASSAY OF IRON: CPT

## 2022-03-14 PROCEDURE — 81001 URINALYSIS AUTO W/SCOPE: CPT

## 2022-03-14 PROCEDURE — A9585: CPT

## 2022-03-14 PROCEDURE — 84100 ASSAY OF PHOSPHORUS: CPT

## 2022-03-14 RX ORDER — POLYETHYLENE GLYCOL 3350 17 G/17G
17 POWDER, FOR SOLUTION ORAL
Qty: 510 | Refills: 0
Start: 2022-03-14 | End: 2022-04-12

## 2022-03-14 RX ORDER — CODEINE SULFATE 60 MG/1
1 TABLET ORAL
Qty: 20 | Refills: 0
Start: 2022-03-14 | End: 2022-03-18

## 2022-03-14 RX ORDER — CLONAZEPAM 1 MG
0.5 TABLET ORAL AT BEDTIME
Refills: 0 | Status: DISCONTINUED | OUTPATIENT
Start: 2022-03-14 | End: 2022-03-14

## 2022-03-14 RX ORDER — INFLUENZA VIRUS VACCINE 15; 15; 15; 15 UG/.5ML; UG/.5ML; UG/.5ML; UG/.5ML
0.5 SUSPENSION INTRAMUSCULAR ONCE
Refills: 0 | Status: DISCONTINUED | OUTPATIENT
Start: 2022-03-14 | End: 2022-03-14

## 2022-03-14 RX ADMIN — Medication 1 TABLET(S): at 10:04

## 2022-03-14 RX ADMIN — PANTOPRAZOLE SODIUM 40 MILLIGRAM(S): 20 TABLET, DELAYED RELEASE ORAL at 05:27

## 2022-03-14 RX ADMIN — Medication 0.5 MILLIGRAM(S): at 03:34

## 2022-03-14 RX ADMIN — ENOXAPARIN SODIUM 40 MILLIGRAM(S): 100 INJECTION SUBCUTANEOUS at 05:27

## 2022-03-14 RX ADMIN — CODEINE SULFATE 15 MILLIGRAM(S): 60 TABLET ORAL at 10:15

## 2022-03-14 RX ADMIN — LOSARTAN POTASSIUM 100 MILLIGRAM(S): 100 TABLET, FILM COATED ORAL at 05:28

## 2022-03-14 RX ADMIN — CODEINE SULFATE 15 MILLIGRAM(S): 60 TABLET ORAL at 03:34

## 2022-03-14 RX ADMIN — CHLORHEXIDINE GLUCONATE 1 APPLICATION(S): 213 SOLUTION TOPICAL at 11:36

## 2022-03-14 RX ADMIN — Medication 30 MILLIGRAM(S): at 05:27

## 2022-03-14 NOTE — DISCHARGE NOTE NURSING/CASE MANAGEMENT/SOCIAL WORK - NSDCPEFALRISK_GEN_ALL_CORE
For information on Fall & Injury Prevention, visit: [email or web address] OR  [email or web address] OR  https://www.cdc.gov/steadi/patient.html

## 2022-03-14 NOTE — DISCHARGE NOTE PROVIDER - PROVIDER TOKENS
PROVIDER:[TOKEN:[[id number]],FOLLOWUP:[1 week]],PROVIDER:[TOKEN:[[id number]],FOLLOWUP:[1 week]] PROVIDER:[TOKEN:[[id number]],FOLLOWUP:[1 week]],PROVIDER:[TOKEN:[[id number]],FOLLOWUP:[1 week]],PROVIDER:[TOKEN:[[id number]]] PROVIDER:[TOKEN:[[id number]],FOLLOWUP:[1 week]],PROVIDER:[TOKEN:[[id number]],FOLLOWUP:[1 week]],PROVIDER:[TOKEN:[[id number]]],PROVIDER:[TOKEN:[[id number]],FOLLOWUP:[1 week]]

## 2022-03-14 NOTE — PROGRESS NOTE ADULT - ASSESSMENT
61yo M w/ PMHx of Stage IV gastric adenocarcinoma w/ mets to peritoneum, HTN, Hx of PE (s/p Lovenox & IVC filter) presents for cough/SOB, admitted with COVID PNA.    - No evidence of acute ischemia.  Pain was clearly related to coughing.  Hs trop negative.     - No evidence of meaningful volume overload, with only mild edema of his feet  - BP well controlled on losartan. Can continue  - Supp oxygen, steroids for COVID PNA  - Monitor and replete lytes  - neuro and hem/onc fu  - TO follow closely while admitted

## 2022-03-14 NOTE — PROGRESS NOTE ADULT - SUBJECTIVE AND OBJECTIVE BOX
Neurology Progress Note    S: Patient seen and examined in covid unit. No new events overnight. patient denied  HA. c/o CP from cough. seen by ortho for AFO brace     Medication:  MEDICATIONS  (STANDING):  chlorhexidine 2% Cloths 1 Application(s) Topical daily  codeine 15 milliGRAM(s) Oral every 6 hours  enoxaparin Injectable 40 milliGRAM(s) SubCutaneous every 12 hours  hydrocodone/homatropine Syrup 5 milliLiter(s) Oral every 6 hours  losartan 100 milliGRAM(s) Oral daily  pantoprazole    Tablet 40 milliGRAM(s) Oral before breakfast  polyethylene glycol 3350 17 Gram(s) Oral daily  predniSONE   Tablet 30 milliGRAM(s) Oral two times a day  senna 2 Tablet(s) Oral at bedtime  trimethoprim  160 mG/sulfamethoxazole 800 mG 1 Tablet(s) Oral <User Schedule>    MEDICATIONS  (PRN):  acetaminophen     Tablet .. 650 milliGRAM(s) Oral every 4 hours PRN Temp greater or equal to 38.5C (101.3F)  acetaminophen     Tablet .. 650 milliGRAM(s) Oral every 6 hours PRN Mild Pain (1 - 3), Moderate Pain (4 - 6)  ALBUTerol    90 MICROgram(s) HFA Inhaler 2 Puff(s) Inhalation every 4 hours PRN Shortness of Breath and/or Wheezing  benzonatate 200 milliGRAM(s) Oral three times a day PRN Cough  clonazePAM  Tablet 0.5 milliGRAM(s) Oral at bedtime PRN anxiety  ondansetron Injectable 4 milliGRAM(s) IV Push every 8 hours PRN Nausea and/or Vomiting  oxyCODONE    IR 5 milliGRAM(s) Oral every 4 hours PRN Severe Pain (7 - 10)      Vitals:    Vital Signs Last 24 Hrs  T(C): 36.7 (03-14-22 @ 05:26), Max: 36.7 (03-13-22 @ 17:56)  T(F): 98.1 (03-14-22 @ 05:26), Max: 98.1 (03-13-22 @ 17:56)  HR: 80 (03-14-22 @ 05:26) (80 - 97)  BP: 107/67 (03-14-22 @ 05:26) (107/67 - 125/78)  BP(mean): --  RR: 18 (03-14-22 @ 05:26) (18 - 18)  SpO2: 96% (03-14-22 @ 05:26) (96% - 99%)          General Exam:   General Appearance: Appropriately dressed and in no acute distress       Head: Normocephalic, atraumatic and no dysmorphic features  Ear, Nose, and Throat: Moist mucous membranes  CVS: S1S2+  Resp: No SOB, no wheeze or rhonchi  Abd: soft NTND  Extremities: No edema, no cyanosis  Skin: No bruises, no rashes     Neurological Exam:  Mental Status: Awake, alert and oriented x 3.  Able to follow simple and complex verbal commands. Able to name and repeat. fluent speech. No obvious aphasia or dysarthria noted.   Cranial Nerves: PERRL, EOMI, VFFC, sensation V1-V3 intact,  no obvious facial asymmetry, equal elevation of palate, scm/trap 5/5, tongue is midline on protrusion. no obvious papilledema on fundoscopic exam. hearing is grossly intact.   Motor: Normal bulk, tone and strength throughout except L weakness in dorsiflexion 3/5 . Fine finger movements were intact and symmetric. no tremors or drift noted.    Sensation: Intact to light touch and pinprick throughout. no right/left confusion. no extinction to tactile on DSS. Romberg was negative.   Reflexes: 1+ throughout at biceps, brachioradialis, triceps, patellars and ankles bilaterally and equal. No clonus. R toe and L toe were both downgoing.  Coordination: No dysmetria on FNF   Gait: deferred      I personally reviewed the below data/images/labs:    CBC Full  -  ( 13 Mar 2022 07:02 )  WBC Count : 11.08 K/uL  RBC Count : 3.26 M/uL  Hemoglobin : 8.3 g/dL  Hematocrit : 26.9 %  Platelet Count - Automated : 309 K/uL  Mean Cell Volume : 82.5 fl  Mean Cell Hemoglobin : 25.5 pg  Mean Cell Hemoglobin Concentration : 30.9 gm/dL  Auto Neutrophil # : x  Auto Lymphocyte # : x  Auto Monocyte # : x  Auto Eosinophil # : x  Auto Basophil # : x  Auto Neutrophil % : x  Auto Lymphocyte % : x  Auto Monocyte % : x  Auto Eosinophil % : x  Auto Basophil % : x      03-13    137  |  100  |  16  ----------------------------<  100<H>  4.1   |  25  |  0.81    Ca    8.7      13 Mar 2022 07:02          ACC: [id number] EXAM:  CT BRAIN                          PROCEDURE DATE:  03/10/2022          INTERPRETATION:  .    CLINICAL INFORMATION: Left-sided foot weakness. Stage IV gastric cancer.    TECHNIQUE: Multiple axial CT images of the head were obtained without   contrast. Sagittal and coronal reconstructed images were acquired from   the source data.    COMPARISON: No prior CT studies of the brain are available for comparison   at this institution.    FINDINGS: There is no acute intracranial hemorrhage, mass effect, shift   of the midline structures, herniation, extra-axial fluid collection, or   hydrocephalus.    There is diffuse cerebral volume loss with prominence of the sulci,   fissures, and cisternal spaces which is normal for the patient's age.   There is minimal deep and periventricular white matter hypoattenuation   statistically compatible with microvascular changes given calcific   atherosclerotic disease of the intracranial arteries.    Scattered mucosal thickening is seen throughout the paranasal sinuses,   and associated in the left sphenoid sinus. The calvarium is intact. The   imaged orbits are unremarkable.    IMPRESSION: No acute intracranial hemorrhage, mass effect, or shift of   the midline structures.    --- End of Report ---            < from: MR Head w/wo IV Cont (03.11.22 @ 22:32) >    ACC: [id number] EXAM:  MR SPINE LUMBAR WAW                         ACC: [id number] EXAM:  MR BRAIN WAW IC                          PROCEDURE DATE:  03/11/2022          INTERPRETATION:  Clinical indications: Left foot drop. Adenocarcinoma of   the stomach. Staging MRI    MRI of the brain was performed using sagittal T1 axial SPGR T2 T2 FLAIR   diffusion and susceptibility weighted sequence. The patient was injected   with approximately 6 cc gadavist IV with 1.5 cc of contrast discarded.    MRI of the lumbar spine was performed using sagittal T1-T2 and   T2-weighted sequences fat suppression. Axial T1 and T2-weighted sequences   were performed as well. The patient was injected with gadavist IV and   sagittal and axial T1-weighted sequences wereperformed.    This exam is compared with prior head CT performed on March 10, 2022.    Brain:    Parenchymal volume loss is seen.    There is no evidence acute hemorrhage mass, mass effect or abnormal   enhancement seen.    Evaluation of the diffusion weighted sequence demonstrates no abnormal   areas of restricted diffusion to suggest acute infarct.    The large vessels demonstrate normal flow voids    Left sphenoid sinus mucosal thickening is seen. Loss of the normal lumbar   lordosis is seen.    Lumbar spine:    The vertebral body height and alignment appear normal. Disc desiccation   is seen involving the L4-5 and L5-S1 levels secondary to chronic   degenerative change    Modic type II degenerative changes are seen involving the endplates  adjacent to the L4-5 disc which is secondary to chronic degenerative   change    Nonspecific focus of enhancement is seen involving the L5-S1 disc. This   is less likely compatible with underlying infection given the normal   appearance of the adjacent vertebral bodies though continued close   interval follow-up is recommended    T11-12: Normal    T12-L1: Normal    L1-2: Normal    L2-3: Bilateral hypertrophic facet joint changes. No significant   compromise of the right neural foramen.    L3-4:Disc bulge and bilateral hypertrophic facet joint changes. No   significant commas of the spinal canal or either neural foramen.    L4-5: Disc bulge and bilateral hypertrophic facet joint change. No   significant commas of the spinal canal or either neural foramen    L5-S1: Disc bulge and bilateral hypertrophic facet changes. No   significant compromise of the spinal canal or either neural foramen    The conus ends at the bottom of L1 and appears normal    Left-sided renal cyst is seen.    Evaluation of the paraspinal soft tissues appear normal.    IMPRESSION: No evidence of acute hemorrhage mass mass effect or abnormal   enhancement in the posterior fossa supratentorial region.    Evaluation of the lumbar spine demonstrates nonspecific focusof   enhancement involving the L5-S1 disc as described above. Degenerative   changes as described above.        --- End of Report ---            SKY GARDUNO MD; Attending Radiologist  This document has been electronically signed. Mar 12 2022  9:18AM    < end of copied text >

## 2022-03-14 NOTE — PROGRESS NOTE ADULT - SUBJECTIVE AND OBJECTIVE BOX
[facility] -- Kanchan Meehan, David, Brenda, Michael Rodgers Savella  Office # [phone]      Follow Up:  chest pain     Subjective/Observations:   Patient seen and examined. Events noted. Resting comfortably in bed. No complaints of chest pain, dyspnea, or palpitations reported. No signs of orthopnea or PND.     REVIEW OF SYSTEMS: All other review of systems is negative unless indicated above    PAST MEDICAL & SURGICAL HISTORY:  Gastric adenocarcinoma  stage 4    Hypertension    S/P IVC filter    Anorectal abscess        MEDICATIONS  (STANDING):  chlorhexidine 2% Cloths 1 Application(s) Topical daily  codeine 15 milliGRAM(s) Oral every 6 hours  enoxaparin Injectable 40 milliGRAM(s) SubCutaneous every 12 hours  hydrocodone/homatropine Syrup 5 milliLiter(s) Oral every 6 hours  losartan 100 milliGRAM(s) Oral daily  pantoprazole    Tablet 40 milliGRAM(s) Oral before breakfast  polyethylene glycol 3350 17 Gram(s) Oral daily  predniSONE   Tablet 30 milliGRAM(s) Oral two times a day  senna 2 Tablet(s) Oral at bedtime  trimethoprim  160 mG/sulfamethoxazole 800 mG 1 Tablet(s) Oral <User Schedule>    MEDICATIONS  (PRN):  acetaminophen     Tablet .. 650 milliGRAM(s) Oral every 4 hours PRN Temp greater or equal to 38.5C (101.3F)  acetaminophen     Tablet .. 650 milliGRAM(s) Oral every 6 hours PRN Mild Pain (1 - 3), Moderate Pain (4 - 6)  ALBUTerol    90 MICROgram(s) HFA Inhaler 2 Puff(s) Inhalation every 4 hours PRN Shortness of Breath and/or Wheezing  benzonatate 200 milliGRAM(s) Oral three times a day PRN Cough  clonazePAM  Tablet 0.5 milliGRAM(s) Oral at bedtime PRN anxiety  ondansetron Injectable 4 milliGRAM(s) IV Push every 8 hours PRN Nausea and/or Vomiting  oxyCODONE    IR 5 milliGRAM(s) Oral every 4 hours PRN Severe Pain (7 - 10)      Allergies    No Known Allergies    Intolerances            Vital Signs Last 24 Hrs  T(C): 36.7 (14 Mar 2022 05:26), Max: 36.7 (13 Mar 2022 17:56)  T(F): 98.1 (14 Mar 2022 05:26), Max: 98.1 (13 Mar 2022 17:56)  HR: 80 (14 Mar 2022 05:26) (80 - 97)  BP: 107/67 (14 Mar 2022 05:26) (107/67 - 125/78)  BP(mean): --  RR: 18 (14 Mar 2022 05:26) (18 - 18)  SpO2: 96% (14 Mar 2022 05:26) (96% - 99%)    I&O's Summary    13 Mar 2022 07:01  -  14 Mar 2022 07:00  --------------------------------------------------------  IN: 0 mL / OUT: 1050 mL / NET: -1050 mL          PHYSICAL EXAM:     Constitutional: NAD, awake and alert, well-developed  HEENT: Moist Mucous Membranes, Anicteric  Pulmonary: Non-labored, breath sounds are clear bilaterally, No wheezing, rales or rhonchi  Cardiovascular: Regular, S1 and S2, No murmurs, rubs, gallops or clicks  Gastrointestinal: Bowel Sounds present, soft, nontender.   Lymph: No peripheral edema. No lymphadenopathy.  Skin: No visible rashes or ulcers.  Psych:  Mood & affect appropriate for situation    LABS: All Labs Reviewed:                        8.3    11.08 )-----------( 309      ( 13 Mar 2022 07:02 )             26.9                         8.8    11.15 )-----------( 360      ( 12 Mar 2022 07:05 )             28.9     13 Mar 2022 07:02    137    |  100    |  16     ----------------------------<  100    4.1     |  25     |  0.81     Ca    8.7        13 Mar 2022 07:02

## 2022-03-14 NOTE — DISCHARGE NOTE PROVIDER - CARE PROVIDERS DIRECT ADDRESSES
[email or web address],[email or web address],DirectAddress_Unknown,DirectAddress_Unknown,DirectAddress_Unknown

## 2022-03-14 NOTE — PROGRESS NOTE ADULT - PROBLEM SELECTOR PLAN 1
CTA chest with conglomerate lymphadenopathy in the mediastinal along with thickening of the bronchovascular interstitium suggestive of lymphangitic spread  -Pt normoxic but with persistent cough, SOB. Cough had been improving with Hycodan and steroids, now with increased cough again  -Continue hycodan 5cc q6h (hold for sedation), codeine 15mg PO q6h (hold for sedation)  -Prednisone 30mg PO BID, continue until outpatient f/u  -Continue Protonix 40mg PO qd while on steroids   -Bactrim for PCP ppx as per ID  -F/u in office Monday 3/21. CTA chest with conglomerate lymphadenopathy in the mediastinal along with thickening of the bronchovascular interstitium suggestive of lymphangitic spread  -Pt normoxic but with persistent cough, SOB. Cough had been improving with Hycodan and steroids, now with increased cough again  -Continue hycodan 5cc q6h (hold for sedation), codeine 15mg PO q6h (hold for sedation)  -Prednisone 30mg PO BID, continue until outpatient f/u  -Continue Protonix 40mg PO qd while on steroids   -Bactrim for PCP ppx as per ID  -F/u in office Monday 3/21..

## 2022-03-14 NOTE — DISCHARGE NOTE NURSING/CASE MANAGEMENT/SOCIAL WORK - NSDCFUADDAPPT_GEN_ALL_CORE_FT
Please followup with your PCP and Heme/onc in 1-2 weeks for further workup and management. Please follow up with Neuro to repeat MR head and MR Lumbar spine for your foot drop/

## 2022-03-14 NOTE — DISCHARGE NOTE PROVIDER - HOSPITAL COURSE
63yo M w/ PMHx of Stage IV gastric adenocarcinoma w/ mets to peritoneum, HTN, Hx of PE (s/p Lovenox & IVC filter) presents for cough/SOB, found to be anemic and COVID positive. Seen and evaluated by ID. s/p RDV and steroid, monitor off ABx. Elevated d-dimer, US LE neg for DVT. Likely elevated in setting of malignancy and COVID. Now down-trending. CTA neg for PE. Patient also seen by Neuro for Left food drop. CTH neg. MRI brain neg  MRI L spine with non specific enhancement L5/S1; f/u in 6 months recommended. L foot Dorsi flexion weakness of unclear etiology;  chemo can cause neuropathy not weakness. Seen by Ortho for AFO brace. Patient remains stable for DC with close follow up with PCP, Heme/onc, Neuro, as per Dr. Villanueva

## 2022-03-14 NOTE — PROGRESS NOTE ADULT - PROBLEM SELECTOR PROBLEM 3
Gastric adenocarcinoma
2019 novel coronavirus disease (COVID-19)
Anemia
Anemia
Gastric adenocarcinoma
Gastric adenocarcinoma
Anemia

## 2022-03-14 NOTE — DISCHARGE NOTE PROVIDER - NSDCCPCAREPLAN_GEN_ALL_CORE_FT
PRINCIPAL DISCHARGE DIAGNOSIS  Diagnosis: 2019 novel coronavirus disease (COVID-19)  Assessment and Plan of Treatment: You tested positive for COVID 19.  You no longer require hospitalization. You should quarantine for 10 days.  Please restrict activities outside of your home except for getting medical care.  Do not go to work, school, or public areas.  Avoid using public transportation, ride-sharing, or taxis.  Separate yourself from other people and animals in your home.  Call ahead before visiting your doctor.  Wear a facemask when you are around other people. Cover your cough and sneezes.  Clean your hands often.  Avoid sharing personal household items.  Clean all frequently touched surfaces daily.        SECONDARY DISCHARGE DIAGNOSES  Diagnosis: Hypertension  Assessment and Plan of Treatment: Hypertension, also known simply as "high blood pressure" is very common, however can lead to many significant complications if left uncontrolled. When the blood pressure is elevated, the force the blood puts on the walls of the arteries is high and can lead to artery damage. Also, when the heart muscle has to pump blood against a high blood pressure, it thickens and enlarges, just like any muscle does when it has to do more work (think of a weight ). When the blood pressure is very high, people may feel a headache or tired. Some people can feel pounding in their head or have blurry vision. Hearing the heart beating in the ear especially at night can be a sign of high blood pressure. Eventually, symptoms of stroke, heart attack, heart failure or irregular heartbeats can occur  - Exercise: Doing cardiovascular exercise such as running, biking or swimming at least 30 minutes per day most days of the week is recommended to help keep blood pressure healthy  - Lose weight: Maintaining a normal BMI (body mass index) is very important in keeping blood pressure readings normal   - Avoid salt: Sodium in the diet increases the blood pressure in many ways. Salt comes in many foods, so just because you don't add salt to your food it does not mean that you are eating a low salt diet. Read labels and keep sodium intake to less than 2000 mg per day   - Avoid alcohol: Even 1 or 2 alcoholic drinks can significantly increase blood pressure   - DASH Diet: The DASH diet has been shown to reduce blood pressure   - Take all medication as prescribed.   - Follow up with your medical doctor for routine blood pressure monitoring at your next visit.   - Notify your doctor if you have any of the following symptoms:    - Dizziness, Lightheadedness, Blurry vision, Headache, Chest pain, Shortness of breath

## 2022-03-14 NOTE — DISCHARGE NOTE PROVIDER - CARE PROVIDER_API CALL
Gama Fernandes  INTERNAL MEDICINE  [address] [place] [address]  Phone: [phone]  Fax: [phone]  Follow Up Time: 1 week    Brad Rojas  Hematology/Oncology  [address] [place] [address]  Phone: [phone]  Fax: [phone]  Follow Up Time: 1 week   Gama Fernandes  INTERNAL MEDICINE  [address] [place] [address]  Phone: [phone]  Fax: [phone]  Follow Up Time: 1 week    Brad Rojas  Hematology/Oncology  [address] [place] [address]  Phone: [phone]  Fax: [phone]  Follow Up Time: 1 week    Red Novak)  Neurology; Vascular Neurology  [address] [facility], [address]  [place] [address]  Phone: [phone]  Fax: [phone]  Follow Up Time:    Gama Fernandes  INTERNAL MEDICINE  [address] [place] [address]  Phone: [phone]  Fax: [phone]  Follow Up Time: 1 week    Brad Rojas  Hematology/Oncology  [address] [place] [address]  Phone: [phone]  Fax: [phone]  Follow Up Time: 1 week    Red Novak)  Neurology; Vascular Neurology  [address] [facility], [address]  [place] [address]  Phone: [phone]  Fax: [phone]  Follow Up Time:     Bryan Christensen)  Critical Care Medicine  [address] [facility]  [place] [address]  Phone: [phone]  Fax: [phone]  Follow Up Time: 1 week

## 2022-03-14 NOTE — DISCHARGE NOTE PROVIDER - NPI NUMBER (FOR SYSADMIN USE ONLY) :
[[id number]],[[id number]] [[id number]],[[id number]],[[id number]] [[id number]],[[id number]],[[id number]],[[id number]]

## 2022-03-14 NOTE — DISCHARGE NOTE PROVIDER - NSDCFUADDAPPT_GEN_ALL_CORE_FT
Please followup with your PCP and Heme/onc in 1-2 weeks for further workup and management. Please follow up with Neuro to repeat MR head and MR Lumbar spine for your foot drop/ Please followup with your PCP and Heme/onc in 1-2 weeks for further workup and management. Please follow up with Neuro to repeat MR head and MR Lumbar spine for your foot drop  Please follow up with Pulm, Dr. Rachel on Monday for further assessment for your breathing and need for medications including steroid taper**

## 2022-03-14 NOTE — PROGRESS NOTE ADULT - ASSESSMENT
63yo M w/  Stage IV gastric adenocarcinoma (dx 11/2021) w/ mets to peritoneum, HTN, Hx of PE (s/p Lovenox & IVC filter) presents for cough/SOB, found to be COVID +, CTA chest negative for PE. neuro called for L foot weakness.   o/e L foot weakness DF.   CTH neg  dimer 2800  MRI brain neg  MRI L spine with non specific enhancement L5/S1; f/u in 6 months recommended     Impression L foot Dorsi flexion weakness of unclear etiology;  chemo can cause neuropathy not weakness    - for covid, getting remdesivir until 3/12  and steroids   - get AFO brace for left foot ; seen by ortho for AFO brace   - c/f melana.   - palliaitve eval  - heme/onc recs appreicated   - c/w covid airborne and contact precautions   - possible palliative RT  - repeat MRI L spine in 6 months   - telemetry  - PT/OT/SS/SLP, OOBC  - check FS, glucose control <180  - further recs based on above  - can consider outpatient EMG/NCS   - GI/DVT ppx  - Thank you for allowing me to participate in the care of this patient. Call with questions.   - no further neuro workup at this time   Red Novak MD  Vascular Neurology  Office: [phone]

## 2022-03-14 NOTE — PROGRESS NOTE ADULT - SUBJECTIVE AND OBJECTIVE BOX
Patient is a 62y old  Male who presents with a chief complaint of Cough/SOB (14 Mar 2022 10:46)    Patient seen this morning. Still coughing but O2 saturation normal on room air.    MEDICATIONS  (STANDING):  chlorhexidine 2% Cloths 1 Application(s) Topical daily  codeine 15 milliGRAM(s) Oral every 6 hours  enoxaparin Injectable 40 milliGRAM(s) SubCutaneous every 12 hours  hydrocodone/homatropine Syrup 5 milliLiter(s) Oral every 6 hours  losartan 100 milliGRAM(s) Oral daily  pantoprazole    Tablet 40 milliGRAM(s) Oral before breakfast  polyethylene glycol 3350 17 Gram(s) Oral daily  predniSONE   Tablet 30 milliGRAM(s) Oral two times a day  senna 2 Tablet(s) Oral at bedtime  trimethoprim  160 mG/sulfamethoxazole 800 mG 1 Tablet(s) Oral <User Schedule>    MEDICATIONS  (PRN):  acetaminophen     Tablet .. 650 milliGRAM(s) Oral every 4 hours PRN Temp greater or equal to 38.5C (101.3F)  acetaminophen     Tablet .. 650 milliGRAM(s) Oral every 6 hours PRN Mild Pain (1 - 3), Moderate Pain (4 - 6)  ALBUTerol    90 MICROgram(s) HFA Inhaler 2 Puff(s) Inhalation every 4 hours PRN Shortness of Breath and/or Wheezing  benzonatate 200 milliGRAM(s) Oral three times a day PRN Cough  clonazePAM  Tablet 0.5 milliGRAM(s) Oral at bedtime PRN anxiety  ondansetron Injectable 4 milliGRAM(s) IV Push every 8 hours PRN Nausea and/or Vomiting  oxyCODONE    IR 5 milliGRAM(s) Oral every 4 hours PRN Severe Pain (7 - 10)      ROS  No fever, sweats, chills  No epistaxis, HA, sore throat  Dry cough - ongoing  No n/v/d, abd pain, melena, hematochezia  No edema  No rash  No anxiety  Left foot in brace secondary to unilateral foot drop  No bleeding, bruising  No dysuria, hematuria    Vital Signs Last 24 Hrs  T(C): 36.7 (14 Mar 2022 05:26), Max: 36.7 (13 Mar 2022 17:56)  T(F): 98.1 (14 Mar 2022 05:26), Max: 98.1 (13 Mar 2022 17:56)  HR: 80 (14 Mar 2022 05:26) (80 - 97)  BP: 107/67 (14 Mar 2022 05:26) (107/67 - 125/78)  BP(mean): --  RR: 18 (14 Mar 2022 05:26) (18 - 18)  SpO2: 96% (14 Mar 2022 05:26) (96% - 99%)    PE  NAD  Awake, alert  Anicteric, MMM  RRR  CTAB  Abd soft, NT, ND  No c/c/e  No rash grossly  FROM                          8.3    11.08 )-----------( 309      ( 13 Mar 2022 07:02 )             26.9       03-13    137  |  100  |  16  ----------------------------<  100<H>  4.1   |  25  |  0.81    Ca    8.7      13 Mar 2022 07:02

## 2022-03-14 NOTE — PROGRESS NOTE ADULT - PROBLEM SELECTOR PROBLEM 1
2019 novel coronavirus disease (COVID-19)
Lymphangitic lung metastasis
Lymphangitic lung metastasis
Cough
2019 novel coronavirus disease (COVID-19)
Lymphangitic lung metastasis
2019 novel coronavirus disease (COVID-19)

## 2022-03-14 NOTE — DISCHARGE NOTE PROVIDER - PROVIDER RX CONTACT NUMBER
**Required when the History and Physical has been performed prior to Registration**  (within a 30 day period, if it's over 30 days then a new and complete History and Physical needs to be completed)  **An update to the history and physical must be completed prior to the start of the surgery or procedure requiring anesthesia services.**    The History and Physical has been reviewed and I have examined the patient.  Based upon my physical assessment and interview of the patient:    Check and/or complete:    [X] No significant changes have occurred in the patient's condition since the History and Physical was completed.      OR    [_] Changes to History and Physical (see below):          Josiane Waters PA-C   2/3/2022    [phone]

## 2022-03-14 NOTE — PROGRESS NOTE ADULT - REASON FOR ADMISSION
Cough/SOB

## 2022-03-14 NOTE — PROGRESS NOTE ADULT - PROBLEM SELECTOR PLAN 2
+COVID PCR  -Fully vaccinated  -S/p Remdesivir x5 days  -Normoxic  -DVT ppx  -Trend inflammatory markers   -Pt with IVC filter in place, CTA chest neg PE  -Ddimer elevated, LE duplex neg DVT.

## 2022-03-14 NOTE — PROGRESS NOTE ADULT - SUBJECTIVE AND OBJECTIVE BOX
Follow-up Pulm Progress Note    Still with cough, was able to sleep a little better last night  Denies CP, SOB    Medications:  MEDICATIONS  (STANDING):  chlorhexidine 2% Cloths 1 Application(s) Topical daily  codeine 15 milliGRAM(s) Oral every 6 hours  enoxaparin Injectable 40 milliGRAM(s) SubCutaneous every 12 hours  hydrocodone/homatropine Syrup 5 milliLiter(s) Oral every 6 hours  losartan 100 milliGRAM(s) Oral daily  pantoprazole    Tablet 40 milliGRAM(s) Oral before breakfast  polyethylene glycol 3350 17 Gram(s) Oral daily  predniSONE   Tablet 30 milliGRAM(s) Oral two times a day  senna 2 Tablet(s) Oral at bedtime  trimethoprim  160 mG/sulfamethoxazole 800 mG 1 Tablet(s) Oral <User Schedule>    MEDICATIONS  (PRN):  acetaminophen     Tablet .. 650 milliGRAM(s) Oral every 4 hours PRN Temp greater or equal to 38.5C (101.3F)  acetaminophen     Tablet .. 650 milliGRAM(s) Oral every 6 hours PRN Mild Pain (1 - 3), Moderate Pain (4 - 6)  ALBUTerol    90 MICROgram(s) HFA Inhaler 2 Puff(s) Inhalation every 4 hours PRN Shortness of Breath and/or Wheezing  benzonatate 200 milliGRAM(s) Oral three times a day PRN Cough  clonazePAM  Tablet 0.5 milliGRAM(s) Oral at bedtime PRN anxiety  ondansetron Injectable 4 milliGRAM(s) IV Push every 8 hours PRN Nausea and/or Vomiting  oxyCODONE    IR 5 milliGRAM(s) Oral every 4 hours PRN Severe Pain (7 - 10)    Vital Signs Last 24 Hrs  T(C): 36.9 (14 Mar 2022 11:08), Max: 36.9 (14 Mar 2022 11:08)  T(F): 98.4 (14 Mar 2022 11:08), Max: 98.4 (14 Mar 2022 11:08)  HR: 92 (14 Mar 2022 11:08) (80 - 92)  BP: 99/63 (14 Mar 2022 11:08) (99/63 - 125/78)  BP(mean): --  RR: 18 (14 Mar 2022 11:08) (18 - 18)  SpO2: 97% (14 Mar 2022 11:08) (96% - 98%)    03-13 @ 07:01  -  03-14 @ 07:00  --------------------------------------------------------  IN: 0 mL / OUT: 1050 mL / NET: -1050 mL    LABS:                        8.3    11.08 )-----------( 309      ( 13 Mar 2022 07:02 )             26.9     03-13    137  |  100  |  16  ----------------------------<  100<H>  4.1   |  25  |  0.81    Ca    8.7      13 Mar 2022 07:02    CULTURES:    Culture - Blood (collected 03-07-22 @ 18:32)  Source: .Blood Blood-Venous  Final Report (03-12-22 @ 19:00):    No Growth Final    Culture - Blood (collected 03-07-22 @ 18:31)  Source: .Blood Blood-Venous  Final Report (03-12-22 @ 19:00):    No Growth Final        Culture - Urine (collected 03-07-22 @ 22:12)  Source: Clean Catch Clean Catch (Midstream)  Final Report (03-08-22 @ 17:11):    <10,000 CFU/mL Normal Urogenital Yadira    Physical Examination:  PULM: Clear to auscultation bilaterally, no significant sputum production  CVS: RRR    RADIOLOGY REVIEWED      CT chest:< from: CT Angio Chest PE Protocol w/ IV Cont (03.07.22 @ 16:19) >  INTERPRETATION:  Reason for Exam: Shortness of breath. chest pain.    CTA of the chest was performed from the thoracic inlet to the level of   the adrenal glands following IV contrast injection of  80 cc of Omnipaque   350. No immediate complications were reported.  MIP images were also   created and reviewed.    Comparison: Chest x-ray of same date    Tubes/Lines: Right Mediport catheter seen with tip in SVC    Mediastinum and Heart: Aorta and pulmonary arteries are normal in size.   Thyroid gland is unremarkable. There is conglomerate lymphadenopathy in   the mediastinum including the prevascular, right left paratracheal, AP   window subcarinal and right hilar stations. The largest area of   lymphadenopathy is conglomerate lymphadenopathy in the right hilum   measuring approximately 3 x 3.9 cm. There is narrowing of the proximal   right and left mainstem bronchi from conglomerate lymphadenopathy. No   pericardial effusion.    Lungs, Pleura, and Airways: There is no pulmonary embolus. Bilateral   thickening of the peribronchial vascular interstitium along with areas of   septal thickening suggestive of lymphangitic spread.    Small bilateral pleural effusions noted.    Visualized Abdomen: IVC filter is noted in place. A partially imaged left   adrenal mass measures 4.3 x 3.2 cm.    Bones and soft tissues: Unremarkable.    IMPRESSION:    No pulmonary embolus.    Conglomerate lymphadenopathy in the mediastinal along with thickening of   the bronchovascular interstitium suggestive of lymphangitic spread.    Small bilateral pleural effusions.    Partially imaged left adrenal mass measuring 4.3 x 3.2 cm consistent with   metastatic disease.    --- End of Report ---    < end of copied text >

## 2022-03-14 NOTE — PROGRESS NOTE ADULT - ASSESSMENT
61 y/o M with PMH of Stage IV gastric adenocarcinoma w/ mets to peritoneum (dx 11/2021), HTN, Hx of PE (s/p Lovenox & IVC filter - now off Lovenox d/t to recent melena). Presents with cough/SOB. He went to [facility] and imaging there showed worsening disease progression and patient was prescribed Augmentin and discharged home. Now with continued symptoms, worsening cough, low grade temp. Found to be COVID positive in ED, CTA chest negative for PE, lymphangitic spread.

## 2022-03-14 NOTE — DISCHARGE NOTE PROVIDER - NSDCCPTREATMENT_GEN_ALL_CORE_FT
PRINCIPAL PROCEDURE  Procedure: MRI LS spine  Findings and Treatment: Lumbar spine:  The vertebral body height and alignment appear normal. Disc desiccation   is seen involving the L4-5 and L5-S1 levels secondary to chronic   degenerative change  Modic type II degenerative changes are seen involving the endplates   adjacent to the L4-5 disc which is secondary to chronic degenerative   change  Nonspecific focus of enhancement is seen involving the L5-S1 disc. This   is less likely compatible with underlying infection given the normal   appearance of the adjacent vertebral bodies though continued close   interval follow-up is recommended  T11-12: Normal  T12-L1: Normal  L1-2: Normal  L2-3: Bilateral hypertrophic facet joint changes. No significant   compromise of the right neural foramen.  L3-4: Disc bulge and bilateral hypertrophic facet joint changes. No   significant commas of the spinal canal or either neural foramen.  L4-5: Disc bulge and bilateral hypertrophic facet joint change. No   significant commas of the spinal canal or either neural foramen  L5-S1: Disc bulge and bilateral hypertrophic facet changes. No   significant compromise of the spinal canal or either neural foramen  The conus ends at the bottom of L1 and appears normal  Left-sided renal cyst is seen.  Evaluation of the paraspinal soft tissues appear normal.  IMPRESSION: No evidence of acute hemorrhage mass mass effect or abnormal   enhancement in the posterior fossa supratentorial region.  Evaluation of the lumbar spine demonstrates nonspecific focus of   enhancement involving the L5-S1 disc as described above. Degenerative   changes as described above.

## 2022-03-14 NOTE — PROGRESS NOTE ADULT - ASSESSMENT
Patient is a 62 year old male under [place] [facility] [place] Rojas with stage IV gastric adenocarcinoma (pMMR, HER-2 0+, CPS <1) with mets to the peritoneum. First cycle of chemotherapy was FOLFOX/Nivolumab - patient initially had response but was admitted to [facility] for SOB, cough and found to have progression of disease. CT chest/abdomen/pelvis 3/2/2022 showed significant progression of diease and lymphangitic spread. Patient is also reporting dark bowel movements, strongly concerning for melena.    Patient received one dose of paclitaxel 80 mg/m2 on 3/4/2022. RT consult was placed but he has not yet seen Dr. Forrest of [facility]. He is now at [facility] with ongoing SOB, cough and dark stools.    Stage IV Gastric Cancer  --Under Care by Dr. Brad Rojas at [facility]  --S/P second line of chemotherapy - Paclitaxel 80 mg/m2 - started on 3/4/2022  --Next chemo due 3/11 - hold - patient concerned about chemo delay and wants to resume as soon as he is able  --Patient was also being evaluated for palliative RT  --RT consulted for palliative RT as requested by Dr. Rojas (pain and bleeding) - they are requesting GI eval with EGD first  --GI symptoms have improved - will defer additional RT consideration until after discharge  --Palliative consult appreciated for symptom management with persistent cough.    Anemia  --Most likely multifactorial  --Anemia of chronic disease/chemotherapy  -- 7.8 today     Respiratory Distress/SOB/FEVERS  --multifocal pna , underlying lymphangitic disease  --patient COVID-19 Positive,   --Pulmonary recs appreciated  --Prednisone 30mg PO being tapered (2 days) - will discharge on 20 mg PO  --Completed remdesivir  --c/w bactrim ppx      L foot Dorsi flexion weakness of unclear etiology  --neurology consult appreciated -   --MRI brain neg  --MRI L spine with non specific enhancement L5/S1   --neuro recommended AFO brace for left foot ,  repeat MRI LS spine in 6 months   --unclear etiology of weakness of left foot    We will continue to follow patient and coordinate with [facility].    Alejandro Ureña PA-C  Hematology/Oncology  [facility]   [phone] (office)  [phone] (alt office)  Evenings and weekends please call MD on call or office

## 2022-03-14 NOTE — DISCHARGE NOTE NURSING/CASE MANAGEMENT/SOCIAL WORK - PATIENT PORTAL LINK FT
You can access the FollowMyHealth Patient Portal offered by [facility] by registering at the following website: [email or web address]. By joining [employer]’s FollowMyHealth portal, you will also be able to view your health information using other applications (apps) compatible with our system.

## 2022-03-14 NOTE — PROGRESS NOTE ADULT - PROVIDER SPECIALTY LIST ADULT
Cardiology
Heme/Onc
Infectious Disease
Neurology
Orthopedics
Pulmonology
Cardiology
Cardiology
Heme/Onc
Infectious Disease
Neurology
Cardiology
Heme/Onc
Infectious Disease
Internal Medicine
Neurology
Pulmonology
Internal Medicine
Pulmonology
Internal Medicine
Internal Medicine
Palliative Care
Internal Medicine
Internal Medicine

## 2022-03-14 NOTE — PROGRESS NOTE ADULT - NSPROGADDITIONALINFOA_GEN_ALL_CORE
- Counseling on diet, exercise, and medication adherence was done  - Counseling on smoking cessation and alcohol consumption offered when appropriate.  - Pain assessed and judicious use of narcotics when appropriate was discussed.    - Stroke education given when appropriate.  - Importance of fall prevention discussed.   - Differential diagnosis and plan of care discussed with patient and/or family and primary team  seen in covid unit
- Counseling on diet, exercise, and medication adherence was done  - Counseling on smoking cessation and alcohol consumption offered when appropriate.  - Pain assessed and judicious use of narcotics when appropriate was discussed.    - Stroke education given when appropriate.  - Importance of fall prevention discussed.   - Differential diagnosis and plan of care discussed with patient and/or family and primary team  seen in covid unit
- Counseling on diet, exercise, and medication adherence was done  - Counseling on smoking cessation and alcohol consumption offered when appropriate.  - Pain assessed and judicious use of narcotics when appropriate was discussed.    - Stroke education given when appropriate.  - Importance of fall prevention discussed.   - Differential diagnosis and plan of care discussed with patient and/or family and primary team
discussed pts current clinical status , management plan in detail in length with pt  all questions/ concerns addressed   [place] code 08281  discussed management plan with acp covering pt
discussed pts current clinical status , management plan in detail in length with pt  all questions/ concerns addressed   [facility] health code 78137  discussed management plan with acp covering pt
discussed pts current clinical status , management plan in detail in length with pt  all questions/ concerns addressed   [place] health code 68520  discussed management plan with acp covering pt

## 2022-03-14 NOTE — PROGRESS NOTE ADULT - PROBLEM SELECTOR PROBLEM 2
Gastric adenocarcinoma
Gastric adenocarcinoma
2019 novel coronavirus disease (COVID-19)
Neoplasm related pain
2019 novel coronavirus disease (COVID-19)
Gastric adenocarcinoma
2019 novel coronavirus disease (COVID-19)
Gastric adenocarcinoma

## 2022-03-14 NOTE — DISCHARGE NOTE PROVIDER - NSDCMRMEDTOKEN_GEN_ALL_CORE_FT
benzonatate 100 mg oral capsule: 2 cap(s) orally 3 times a day, As needed, Cough  chlorproMAZINE 10 mg oral tablet: 1 tab(s) orally 3 times a day  clonazePAM 0.5 mg oral tablet: 1 tab(s) orally once a day (at bedtime)  cyclobenzaprine 10 mg oral tablet:   hydrocodone-homatropine 5 mg-1.5 mg/5 mL oral syrup: 5 milliliter(s) orally every 6 hours  oxyCODONE 5 mg oral tablet: 1 tab(s) orally every 6 hours  pantoprazole 40 mg oral delayed release tablet: 1 tab(s) orally once a day  prochlorperazine 10 mg oral tablet:   telmisartan 80 mg oral tablet: 1 tab(s) orally once a day   benzonatate 100 mg oral capsule: 2 cap(s) orally 3 times a day, As needed, Cough  chlorproMAZINE 10 mg oral tablet: 1 tab(s) orally 3 times a day  clonazePAM 0.5 mg oral tablet: 1 tab(s) orally once a day (at bedtime)  codeine sulfate 15 mg oral tablet: 1 tab(s) orally every 6 hours MDD:4  cyclobenzaprine 10 mg oral tablet:   Hycodan 5 mg-1.5 mg/5 mL oral syrup: 5 milliliter(s) orally every 6 hours MDD:20mL  oxyCODONE 5 mg oral tablet: 1 tab(s) orally every 6 hours  pantoprazole 40 mg oral delayed release tablet: 1 tab(s) orally once a day  polyethylene glycol 3350 oral powder for reconstitution: 17 gram(s) orally once a day  predniSONE 10 mg oral tablet: 3 tab(s) orally 2 times a day  prochlorperazine 10 mg oral tablet:   sulfamethoxazole-trimethoprim 800 mg-160 mg oral tablet: 1 tab(s) orally 3 times a week (Mon, Wed, Fri)   telmisartan 80 mg oral tablet: 1 tab(s) orally once a day

## 2022-03-15 ENCOUNTER — TRANSCRIPTION ENCOUNTER (OUTPATIENT)
Age: 62
End: 2022-03-15

## 2022-03-15 ENCOUNTER — INPATIENT (INPATIENT)
Facility: HOSPITAL | Age: 62
LOS: 1 days | Discharge: HOME | DRG: 314 | End: 2022-03-17
Attending: STUDENT IN AN ORGANIZED HEALTH CARE EDUCATION/TRAINING PROGRAM | Admitting: INTERNAL MEDICINE
Payer: COMMERCIAL

## 2022-03-15 VITALS
SYSTOLIC BLOOD PRESSURE: 113 MMHG | OXYGEN SATURATION: 97 % | RESPIRATION RATE: 22 BRPM | HEART RATE: 122 BPM | HEIGHT: 65 IN | DIASTOLIC BLOOD PRESSURE: 72 MMHG | TEMPERATURE: 98 F

## 2022-03-15 DIAGNOSIS — Z29.9 ENCOUNTER FOR PROPHYLACTIC MEASURES, UNSPECIFIED: ICD-10-CM

## 2022-03-15 DIAGNOSIS — I31.4 CARDIAC TAMPONADE: ICD-10-CM

## 2022-03-15 DIAGNOSIS — I31.3 PERICARDIAL EFFUSION (NONINFLAMMATORY): ICD-10-CM

## 2022-03-15 DIAGNOSIS — I10 ESSENTIAL (PRIMARY) HYPERTENSION: ICD-10-CM

## 2022-03-15 DIAGNOSIS — U07.1 COVID-19: ICD-10-CM

## 2022-03-15 DIAGNOSIS — C16.9 MALIGNANT NEOPLASM OF STOMACH, UNSPECIFIED: ICD-10-CM

## 2022-03-15 DIAGNOSIS — R07.9 CHEST PAIN, UNSPECIFIED: ICD-10-CM

## 2022-03-15 DIAGNOSIS — K61.2 ANORECTAL ABSCESS: Chronic | ICD-10-CM

## 2022-03-15 DIAGNOSIS — Z95.828 PRESENCE OF OTHER VASCULAR IMPLANTS AND GRAFTS: Chronic | ICD-10-CM

## 2022-03-15 LAB
ALBUMIN SERPL ELPH-MCNC: 3.5 G/DL — SIGNIFICANT CHANGE UP (ref 3.3–5)
ALP SERPL-CCNC: 85 U/L — SIGNIFICANT CHANGE UP (ref 40–120)
ALT FLD-CCNC: 20 U/L — SIGNIFICANT CHANGE UP (ref 10–45)
ANION GAP SERPL CALC-SCNC: 14 MMOL/L — SIGNIFICANT CHANGE UP (ref 5–17)
ANISOCYTOSIS BLD QL: SIGNIFICANT CHANGE UP
AST SERPL-CCNC: 21 U/L — SIGNIFICANT CHANGE UP (ref 10–40)
BASE EXCESS BLDV CALC-SCNC: -0.2 MMOL/L — SIGNIFICANT CHANGE UP (ref -2–2)
BASOPHILS # BLD AUTO: 0 K/UL — SIGNIFICANT CHANGE UP (ref 0–0.2)
BASOPHILS NFR BLD AUTO: 0 % — SIGNIFICANT CHANGE UP (ref 0–2)
BILIRUB SERPL-MCNC: 0.8 MG/DL — SIGNIFICANT CHANGE UP (ref 0.2–1.2)
BUN SERPL-MCNC: 22 MG/DL — SIGNIFICANT CHANGE UP (ref 7–23)
BURR CELLS BLD QL SMEAR: PRESENT — SIGNIFICANT CHANGE UP
CA-I SERPL-SCNC: 1.17 MMOL/L — SIGNIFICANT CHANGE UP (ref 1.15–1.33)
CALCIUM SERPL-MCNC: 9 MG/DL — SIGNIFICANT CHANGE UP (ref 8.4–10.5)
CHLORIDE BLDV-SCNC: 99 MMOL/L — SIGNIFICANT CHANGE UP (ref 96–108)
CHLORIDE SERPL-SCNC: 99 MMOL/L — SIGNIFICANT CHANGE UP (ref 96–108)
CO2 BLDV-SCNC: 27 MMOL/L — HIGH (ref 22–26)
CO2 SERPL-SCNC: 25 MMOL/L — SIGNIFICANT CHANGE UP (ref 22–31)
CREAT SERPL-MCNC: 1.11 MG/DL — SIGNIFICANT CHANGE UP (ref 0.5–1.3)
DACRYOCYTES BLD QL SMEAR: SLIGHT — SIGNIFICANT CHANGE UP
EGFR: 76 ML/MIN/1.73M2 — SIGNIFICANT CHANGE UP
ELLIPTOCYTES BLD QL SMEAR: SLIGHT — SIGNIFICANT CHANGE UP
EOSINOPHIL # BLD AUTO: 0 K/UL — SIGNIFICANT CHANGE UP (ref 0–0.5)
EOSINOPHIL NFR BLD AUTO: 0 % — SIGNIFICANT CHANGE UP (ref 0–6)
GAS PNL BLDV: 134 MMOL/L — LOW (ref 136–145)
GAS PNL BLDV: SIGNIFICANT CHANGE UP
GAS PNL BLDV: SIGNIFICANT CHANGE UP
GLUCOSE BLDV-MCNC: 100 MG/DL — HIGH (ref 70–99)
GLUCOSE SERPL-MCNC: 105 MG/DL — HIGH (ref 70–99)
HCO3 BLDV-SCNC: 26 MMOL/L — SIGNIFICANT CHANGE UP (ref 22–29)
HCT VFR BLD CALC: 31.9 % — LOW (ref 39–50)
HCT VFR BLDA CALC: 30 % — LOW (ref 39–51)
HGB BLD CALC-MCNC: 10 G/DL — LOW (ref 12.6–17.4)
HGB BLD-MCNC: 9.7 G/DL — LOW (ref 13–17)
HYPOCHROMIA BLD QL: SLIGHT — SIGNIFICANT CHANGE UP
LACTATE BLDV-MCNC: 3.2 MMOL/L — HIGH (ref 0.7–2)
LYMPHOCYTES # BLD AUTO: 0.82 K/UL — LOW (ref 1–3.3)
LYMPHOCYTES # BLD AUTO: 5.2 % — LOW (ref 13–44)
MACROCYTES BLD QL: SLIGHT — SIGNIFICANT CHANGE UP
MANUAL SMEAR VERIFICATION: SIGNIFICANT CHANGE UP
MCHC RBC-ENTMCNC: 24.8 PG — LOW (ref 27–34)
MCHC RBC-ENTMCNC: 30.4 GM/DL — LOW (ref 32–36)
MCV RBC AUTO: 81.6 FL — SIGNIFICANT CHANGE UP (ref 80–100)
MICROCYTES BLD QL: SLIGHT — SIGNIFICANT CHANGE UP
MONOCYTES # BLD AUTO: 1.23 K/UL — HIGH (ref 0–0.9)
MONOCYTES NFR BLD AUTO: 7.8 % — SIGNIFICANT CHANGE UP (ref 2–14)
NEUTROPHILS # BLD AUTO: 13.67 K/UL — HIGH (ref 1.8–7.4)
NEUTROPHILS NFR BLD AUTO: 87 % — HIGH (ref 43–77)
OVALOCYTES BLD QL SMEAR: SLIGHT — SIGNIFICANT CHANGE UP
PCO2 BLDV: 48 MMHG — SIGNIFICANT CHANGE UP (ref 42–55)
PH BLDV: 7.34 — SIGNIFICANT CHANGE UP (ref 7.32–7.43)
PLAT MORPH BLD: NORMAL — SIGNIFICANT CHANGE UP
PLATELET # BLD AUTO: 367 K/UL — SIGNIFICANT CHANGE UP (ref 150–400)
PO2 BLDV: 46 MMHG — HIGH (ref 25–45)
POIKILOCYTOSIS BLD QL AUTO: SLIGHT — SIGNIFICANT CHANGE UP
POLYCHROMASIA BLD QL SMEAR: SLIGHT — SIGNIFICANT CHANGE UP
POTASSIUM BLDV-SCNC: 4.4 MMOL/L — SIGNIFICANT CHANGE UP (ref 3.5–5.1)
POTASSIUM SERPL-MCNC: 5.2 MMOL/L — SIGNIFICANT CHANGE UP (ref 3.5–5.3)
POTASSIUM SERPL-SCNC: 5.2 MMOL/L — SIGNIFICANT CHANGE UP (ref 3.5–5.3)
PROT SERPL-MCNC: 6.4 G/DL — SIGNIFICANT CHANGE UP (ref 6–8.3)
RBC # BLD: 3.91 M/UL — LOW (ref 4.2–5.8)
RBC # FLD: 29.4 % — HIGH (ref 10.3–14.5)
RBC BLD AUTO: ABNORMAL
SAO2 % BLDV: 68.9 % — SIGNIFICANT CHANGE UP (ref 67–88)
SARS-COV-2 RNA SPEC QL NAA+PROBE: DETECTED
SODIUM SERPL-SCNC: 138 MMOL/L — SIGNIFICANT CHANGE UP (ref 135–145)
TARGETS BLD QL SMEAR: SLIGHT — SIGNIFICANT CHANGE UP
TROPONIN T, HIGH SENSITIVITY RESULT: 13 NG/L — SIGNIFICANT CHANGE UP (ref 0–51)
WBC # BLD: 15.71 K/UL — HIGH (ref 3.8–10.5)
WBC # FLD AUTO: 15.71 K/UL — HIGH (ref 3.8–10.5)

## 2022-03-15 PROCEDURE — 71045 X-RAY EXAM CHEST 1 VIEW: CPT | Mod: 26

## 2022-03-15 PROCEDURE — 99223 1ST HOSP IP/OBS HIGH 75: CPT

## 2022-03-15 PROCEDURE — 99285 EMERGENCY DEPT VISIT HI MDM: CPT

## 2022-03-15 PROCEDURE — 93010 ELECTROCARDIOGRAM REPORT: CPT

## 2022-03-15 RX ORDER — CYCLOBENZAPRINE HYDROCHLORIDE 10 MG/1
10 TABLET, FILM COATED ORAL THREE TIMES A DAY
Refills: 0 | Status: DISCONTINUED | OUTPATIENT
Start: 2022-03-15 | End: 2022-03-17

## 2022-03-15 RX ORDER — CLONAZEPAM 1 MG
0.5 TABLET ORAL AT BEDTIME
Refills: 0 | Status: DISCONTINUED | OUTPATIENT
Start: 2022-03-15 | End: 2022-03-17

## 2022-03-15 RX ORDER — ENOXAPARIN SODIUM 100 MG/ML
40 INJECTION SUBCUTANEOUS EVERY 24 HOURS
Refills: 0 | Status: DISCONTINUED | OUTPATIENT
Start: 2022-03-15 | End: 2022-03-17

## 2022-03-15 RX ORDER — LANOLIN ALCOHOL/MO/W.PET/CERES
3 CREAM (GRAM) TOPICAL AT BEDTIME
Refills: 0 | Status: DISCONTINUED | OUTPATIENT
Start: 2022-03-15 | End: 2022-03-17

## 2022-03-15 RX ORDER — FUROSEMIDE 40 MG
20 TABLET ORAL ONCE
Refills: 0 | Status: DISCONTINUED | OUTPATIENT
Start: 2022-03-15 | End: 2022-03-16

## 2022-03-15 RX ORDER — ACETAMINOPHEN 500 MG
650 TABLET ORAL EVERY 6 HOURS
Refills: 0 | Status: DISCONTINUED | OUTPATIENT
Start: 2022-03-15 | End: 2022-03-17

## 2022-03-15 RX ORDER — LOSARTAN POTASSIUM 100 MG/1
100 TABLET, FILM COATED ORAL DAILY
Refills: 0 | Status: DISCONTINUED | OUTPATIENT
Start: 2022-03-15 | End: 2022-03-17

## 2022-03-15 RX ORDER — MORPHINE SULFATE 50 MG/1
2 CAPSULE, EXTENDED RELEASE ORAL EVERY 4 HOURS
Refills: 0 | Status: DISCONTINUED | OUTPATIENT
Start: 2022-03-15 | End: 2022-03-16

## 2022-03-15 RX ORDER — MORPHINE SULFATE 50 MG/1
2 CAPSULE, EXTENDED RELEASE ORAL ONCE
Refills: 0 | Status: COMPLETED | OUTPATIENT
Start: 2022-03-15 | End: 2022-03-15

## 2022-03-15 RX ORDER — PANTOPRAZOLE SODIUM 20 MG/1
40 TABLET, DELAYED RELEASE ORAL
Refills: 0 | Status: DISCONTINUED | OUTPATIENT
Start: 2022-03-15 | End: 2022-03-17

## 2022-03-15 RX ORDER — POLYETHYLENE GLYCOL 3350 17 G/17G
17 POWDER, FOR SOLUTION ORAL DAILY
Refills: 0 | Status: DISCONTINUED | OUTPATIENT
Start: 2022-03-15 | End: 2022-03-17

## 2022-03-15 RX ORDER — CHLORPROMAZINE HCL 10 MG
10 TABLET ORAL THREE TIMES A DAY
Refills: 0 | Status: DISCONTINUED | OUTPATIENT
Start: 2022-03-15 | End: 2022-03-17

## 2022-03-15 RX ORDER — ONDANSETRON 8 MG/1
4 TABLET, FILM COATED ORAL EVERY 8 HOURS
Refills: 0 | Status: DISCONTINUED | OUTPATIENT
Start: 2022-03-15 | End: 2022-03-17

## 2022-03-15 RX ORDER — OXYCODONE HYDROCHLORIDE 5 MG/1
5 TABLET ORAL EVERY 6 HOURS
Refills: 0 | Status: DISCONTINUED | OUTPATIENT
Start: 2022-03-15 | End: 2022-03-16

## 2022-03-15 RX ORDER — ACETAMINOPHEN 500 MG
1000 TABLET ORAL ONCE
Refills: 0 | Status: COMPLETED | OUTPATIENT
Start: 2022-03-15 | End: 2022-03-15

## 2022-03-15 RX ADMIN — OXYCODONE HYDROCHLORIDE 5 MILLIGRAM(S): 5 TABLET ORAL at 22:38

## 2022-03-15 RX ADMIN — Medication 400 MILLIGRAM(S): at 21:46

## 2022-03-15 RX ADMIN — Medication 30 MILLIGRAM(S): at 22:02

## 2022-03-15 RX ADMIN — Medication 0.5 MILLIGRAM(S): at 22:01

## 2022-03-15 RX ADMIN — OXYCODONE HYDROCHLORIDE 5 MILLIGRAM(S): 5 TABLET ORAL at 23:30

## 2022-03-15 RX ADMIN — Medication 200 MILLIGRAM(S): at 22:37

## 2022-03-15 RX ADMIN — ENOXAPARIN SODIUM 40 MILLIGRAM(S): 100 INJECTION SUBCUTANEOUS at 22:02

## 2022-03-15 RX ADMIN — Medication 200 MILLIGRAM(S): at 17:48

## 2022-03-15 RX ADMIN — MORPHINE SULFATE 2 MILLIGRAM(S): 50 CAPSULE, EXTENDED RELEASE ORAL at 19:35

## 2022-03-15 RX ADMIN — MORPHINE SULFATE 2 MILLIGRAM(S): 50 CAPSULE, EXTENDED RELEASE ORAL at 20:15

## 2022-03-15 RX ADMIN — Medication 1000 MILLIGRAM(S): at 23:40

## 2022-03-15 NOTE — ED ADULT NURSE NOTE - OBJECTIVE STATEMENT
62y male PMH HTN, gastric adenocarcinoma with chemotherapy to the ED from home c/o of cough. Pt was admitted to [facility] last Tuesday for COVID-19. Pt was at the hospital for 7 days and d/c yesterday. Pt reports that cardiologist called pt today and instructed pt to go to ER for echocardiogram results which revealed a pericardial effusion. Pt endorses cough, SOB, chest pain, and abdominal pain at this time. Pt reports the cough has been going on for about a month and is productive. Pt also endorses slight swelling in the legs. Pt denies any n/v, changes in bowel or bladder habits. Pt is vaccinated x3 for COVID-19. Pt son at the bedside and participating in care. Stretcher in lowest position and locked, appropriate side rails in place, blankets given for comfort.

## 2022-03-15 NOTE — H&P ADULT - NSHPLABSRESULTS_GEN_ALL_CORE
TTE 3/14: Conclusions:  1. Mitral annular calcification, otherwise normal mitral  valve. Minimal mitral regurgitation.  2. Calcified aortic valve. No aortic valve regurgitation  seen.  3. Normal left ventricular systolic function. No segmental  wall motion abnormalities.  4. Normal right ventricular size and function.  5. Moderate pericardial effusion.  The effusion measures  1.7cm anterior to the right ventricle as seen from the  subcostal view.  There is significant inflow variation  measured across the tricuspid valve.  Invagination of the  right atrium is present along with partial right  ventricular compression.   Echocardiographic evidence of pericardial tamponade.    EKG personally reviewed: Sinus Tachycardia, no electrical alternans    CXR personally reviewed - chronic interstitial bilateral opacities    Leukocytosis of 15 likely reactive to steroids, no electrolyte abnormalities

## 2022-03-15 NOTE — H&P ADULT - ASSESSMENT
61yo M w/ PMHx of Stage IV gastric adenocarcinoma w/ mets to peritoneum, HTN, Hx of PE (s/p Lovenox & IVC filter) sent in by PMD after TTE was found to have a moderate pericardial effusion with echocardiographic evidence of tamponade

## 2022-03-15 NOTE — ED PROVIDER NOTE - NS ED ROS FT
CONSTITUTIONAL: No fevers, no chills, no lightheadedness, no dizziness  EYES: no visual changes, no eye pain  EARS: no ear drainage, no ear pain, no change in hearing  MOUTH/THROAT: no sore throat  CV: No chest pain, no palpitations  RESP: + SOB, + cough  GI: No n/v/d, no abd pain  : no dysuria, no hematuria, no flank pain  MSK: no back pain, no extremity pain  SKIN: no rashes  NEURO: no headache, no focal weakness, no decreased sensation/paresthesias

## 2022-03-15 NOTE — H&P ADULT - PROBLEM SELECTOR PLAN 2
Likely pleuritic due to severe cough from peritoneal and possibly lung mets  Start humidified oxygen  Give Morphine 2mg IVP STAT  start Morphine 2mg q4h PRN severe pain  Continue oxycodone 5mg PRN  continue hydrocodone syrup  Cardiology on board as above

## 2022-03-15 NOTE — ED PROVIDER NOTE - ATTENDING CONTRIBUTION TO CARE
62y male PMH HTN, gastric adenocarcinoma with chemotherapy to the ED from home c/o of cough was just d/c yesterday with admission for covid pos resp distress, had outpt echo yesterday called in for mod pericardial effusion, no signs of tamponade, ekg with st only, bp nl, no signs of r heart strain, to discuss with dr hidalgo for admission, possible procedure.

## 2022-03-15 NOTE — ED ADULT NURSE NOTE - NSIMPLEMENTINTERV_GEN_ALL_ED
Implemented All Fall Risk Interventions:  [place] to call system. Call bell, personal items and telephone within reach. Instruct patient to call for assistance. Room bathroom lighting operational. Non-slip footwear when patient is off stretcher. Physically safe environment: no spills, clutter or unnecessary equipment. Stretcher in lowest position, wheels locked, appropriate side rails in place. Provide visual cue, wrist band, yellow gown, etc. Monitor gait and stability. Monitor for mental status changes and reorient to person, place, and time. Review medications for side effects contributing to fall risk. Reinforce activity limits and safety measures with patient and family.

## 2022-03-15 NOTE — ED ADULT NURSE REASSESSMENT NOTE - NS ED NURSE REASSESS COMMENT FT1
Report given to inpatient RN. Transport here to bring pt upstairs. As per ED resident Kaveh's verbal orders pt okay to go upstairs off telemetry.

## 2022-03-15 NOTE — H&P ADULT - HISTORY OF PRESENT ILLNESS
63yo M w/ PMHx of Stage IV gastric adenocarcinoma w/ mets to peritoneum, HTN, Hx of PE (s/p Lovenox & IVC filter) sent in by PMD after TTE was found to have a moderate pericardial effusion with echocardiographic evidence of tamponade. He is complaining constant chest pain with severe unrelenting cough, no lightheadedness or diaphoresis. Cough sometimes responds to Robitussin AC but not to tessalon perles. He takes Oxycodone for pain.    Discharged yesterday for cough/SOB, found to be anemic and COVID positive. Seen and evaluated by ID. s/p RDV and steroid, monitor off ABx. Elevated d-dimer, US LE neg for DVT. Likely elevated in setting of malignancy and COVID. Now down-trending. CTA neg for PE. Patient also seen by Neuro for Left food drop. CTH neg. MRI brain neg  MRI L spine with non specific enhancement L5/S1; f/u in 6 months recommended. L foot Dorsi flexion weakness of unclear etiology;  chemo can cause neuropathy not weakness. Seen by Ortho for AFO brace.

## 2022-03-15 NOTE — ED PROVIDER NOTE - OBJECTIVE STATEMENT
62y M w/ PMHx with stage IV gastric adenocarcinoma (pMMR, HER-2 0+, CPS <1) with mets to the peritoneum presenting to the ED with SOB 62y M w/ PMHx with stage IV gastric adenocarcinoma (pMMR, HER-2 0+, CPS <1) with mets to the peritoneum sent to the ED by Dr. Villanueva w/ findings of pericardial effusion on TTE performed yesterday 3/14/22. Patient is complaining of persistent sob since his admission on Tuesday, was found to be COVID+. Patient also complaining of persistent dry cough. Denies dizziness, chest pain, syncope, abd pain, nausea, vomiting, lower extremity swelling. Arrived to room satting >96% on RA. Lungs CTAB, Will rpt labs, CXR and admit on tele. VS WNL.

## 2022-03-15 NOTE — ED PROVIDER NOTE - PHYSICAL EXAMINATION
Physical Exam:  Gen: NAD, AOx3, non-toxic appearing  HEENT: EOMI, PEERL, normal conjunctiva, tongue midline, oral mucosa moist  Lung: CTAB, no respiratory distress, no wheezes/rhonchi/rales B/L, speaking in short sentences interrupted by coughing   CV: RRR, no murmurs, rubs or gallops  Abd: soft, NT, ND, no guarding, no rigidity, no rebound tenderness  MSK: no visible deformities, ROM normal in UE/LE, no back pain  Neuro: No focal sensory or motor deficits  Skin: Warm, well perfused, no rash, no leg swelling  Psych: normal affect, calm  Iris Linn D.O. -Resident

## 2022-03-15 NOTE — H&P ADULT - RS GEN PE MLT RESP DETAILS PC
Hospitalist Progress Note NAME: [facility] :  1939 MRN:  [id number] Assessment / Plan: 
Acute on chronic systolic heart failure; LVEF EF 21-25% (ECHO 2019) CAD -s/p PCI to LAD and LCx on  with Dr. Marcelino Fishman 
Secondary hyperaldosteronism r/t HF Monomorphic V. tach s/p AICD placement 2019 Hypercoagulable state secondary to afib Paroxysmal atrial fibrillation / HTN with h/o orthostatic hypotension  
- clinically: back to baseline BP 90/100, HR stable NT pro-BNP 4,038 on presentation CXR:  1. Small left pleural effusion and left basilar atelectasis. 2. Unchanged cardiomegaly. Multiple recurrent hospitalizations in the past year  
- cardiology help appreciated ( Dr Augustine Kumar):  
end-stage heart failure situation that will not improve or get better. - diuresing: Lasix 40 IV BID --> back on home PO lasix - Continue metoprolol with holding parameters Cont ASA/Plavix and ranolazine (500 mg BID, did not tolerate 1000 mg BID) Poor candidate for full AC due to Becker Prashanth 
in 2019 was started on midodrine Not on ACE inhibitor or Entresto at home due to hypotension Hypokalemia 
-cont daily K supplement  
  
Diabetes mellitus type II 
-BS acceptable in this frail pt  
hba1c 6.9 2019  
-holding Janumet , may consider to stop completely CKD stage III Cr at baseline ~ , stable 
monitor closely. Avoid nephrotoxic drugs, adjust all meds to GFR.  
  
Debility/ recurrent falls, poor candidate for PT Tubular adenoma by biopsy  Pressure ulcer R heel stage III, poa Remote L renal artery stenting PVD Hyperlipidemia,cont statin  
BPH, continue on Flomax and finasteride GERD. Continue Pepcid 
  
  
  
  
Code Status: wife wants to continue full code/  ICD ( she met with hospice today) Surrogate Decision Maker: Wife Zehra Mason DVT Prophylaxis: Heparin  
  
Baseline: lives with wife; poor functional baseline status, ambulating at home with walker Recommended Disposition: Home with Hospice on Monday Subjective: Chief Complaint / Reason for Physician Visit: following HF/ DM  
\" I am ok\" Denies dyspnea Discussed with RN events overnight. Review of Systems: 
Symptom Y/N Comments  Symptom Y/N Comments Fever/Chills    Chest Pain Poor Appetite    Edema y Cough    Abdominal Pain Sputum    Joint Pain SOB/SIBLEY n   Pruritis/Rash Nausea/vomit    Tolerating PT/OT Diarrhea    Tolerating Diet Constipation    Other Could NOT obtain due to:   
 
Objective: VITALS:  
Last 24hrs VS reviewed since prior progress note. Most recent are: 
Patient Vitals for the past 24 hrs: 
 Temp Pulse Resp BP SpO2  
03/21/20 1151 97.7 °F (36.5 °C) 73 18 102/57 100 % 03/21/20 0852 97.5 °F (36.4 °C) 70 17 106/56 98 % 03/21/20 0505 97.7 °F (36.5 °C) 75 17 95/54 97 % 03/20/20 1944 97.4 °F (36.3 °C) 73 18 93/49 99 % 03/20/20 1834  70  100/54   
03/20/20 1442 97.3 °F (36.3 °C) 69 16 97/60 99 % Intake/Output Summary (Last 24 hours) at 3/21/2020 1405 Last data filed at 3/20/2020 6378 Gross per 24 hour Intake 240 ml Output 200 ml Net 40 ml PHYSICAL EXAM: 
General: WD, WN. Alert, cooperative, no acute distress   
EENT:  EOMI. Anicteric sclerae. MMM Resp:  CTA bilaterally, no wheezing or rales. No accessory muscle use CV:  Regular  rhythm,  No edema GI:  Soft, Non distended, Non tender.  +Bowel sounds Neurologic:  Alert and oriented X 3, normal speech, Psych:   Good insight. Not anxious nor agitated Skin:  No rashes. No jaundice Reviewed most current lab test results and cultures  YES Reviewed most current radiology test results   YES Review and summation of old records today    NO Reviewed patient's current orders and MAR    YES 
PMH/SH reviewed - no change compared to H&P 
________________________________________________________________________ Care Plan discussed with: 
  Comments Patient y Family RN y   
Care Manager Consultant  y Hospice Multidiciplinary team rounds were held today with , nursing, pharmacist and clinical coordinator. Patient's plan of care was discussed; medications were reviewed and discharge planning was addressed. ________________________________________________________________________ Total NON critical care TIME:  25   Minutes Total CRITICAL CARE TIME Spent:   Minutes non procedure based Comments >50% of visit spent in counseling and coordination of care    
________________________________________________________________________ Zenia Ulloa MD  
 
Procedures: see electronic medical records for all procedures/Xrays and details which were not copied into this note but were reviewed prior to creation of Plan. LABS: 
I reviewed today's most current labs and imaging studies. Pertinent labs include: 
Recent Labs  
  03/20/20 0450 03/19/20 
1026 WBC 5.4 5.8 HGB 10.4* 10.5* HCT 32.9* 33.3*  
* 142* Recent Labs  
  03/20/20 
0450 03/19/20 
1026  141  
K 3.4* 3.3*  
 108 CO2 29 30 * 110* BUN 21* 27* CREA 1.62* 1.60* CA 8.6 8.4* MG  --  2.3 Signed: Zenia Ulloa MD 
 
 
 
 
 good air movement/respirations non-labored

## 2022-03-15 NOTE — ED CLERICAL - NS ED CLERK NOTE PRE-ARRIVAL INFORMATION; ADDITIONAL PRE-ARRIVAL INFORMATION
CC/Reason For referral: [facility] patient with metastatic gastric ca, recently d/luma from [place]. echo read reveals pericardial effusion with possible tamponade. called in to ED by Dr Villanueva  Preferred Consultant(if applicable): caesar  Who admits for you (if needed): richard  Do you have documents you would like to fax over? no  Would you still like to speak to an ED attending? nathaniel

## 2022-03-15 NOTE — H&P ADULT - PROBLEM SELECTOR PLAN 1
Nayak's triad negative for Tamponade - normotensive, heart sounds are clear, no JVD  EKG - no electrical alternans    Will order repeat TTE  Cardiology on board  Telemetry monitoring

## 2022-03-16 DIAGNOSIS — Z51.5 ENCOUNTER FOR PALLIATIVE CARE: ICD-10-CM

## 2022-03-16 DIAGNOSIS — G89.3 NEOPLASM RELATED PAIN (ACUTE) (CHRONIC): ICD-10-CM

## 2022-03-16 DIAGNOSIS — C16.9 MALIGNANT NEOPLASM OF STOMACH, UNSPECIFIED: ICD-10-CM

## 2022-03-16 DIAGNOSIS — J90 PLEURAL EFFUSION, NOT ELSEWHERE CLASSIFIED: ICD-10-CM

## 2022-03-16 DIAGNOSIS — C78.00 SECONDARY MALIGNANT NEOPLASM OF UNSPECIFIED LUNG: ICD-10-CM

## 2022-03-16 DIAGNOSIS — R05.9 COUGH, UNSPECIFIED: ICD-10-CM

## 2022-03-16 LAB
ANION GAP SERPL CALC-SCNC: 12 MMOL/L — SIGNIFICANT CHANGE UP (ref 5–17)
APPEARANCE UR: CLEAR — SIGNIFICANT CHANGE UP
BASOPHILS # BLD AUTO: 0.01 K/UL — SIGNIFICANT CHANGE UP (ref 0–0.2)
BASOPHILS NFR BLD AUTO: 0.1 % — SIGNIFICANT CHANGE UP (ref 0–2)
BILIRUB UR-MCNC: NEGATIVE — SIGNIFICANT CHANGE UP
BUN SERPL-MCNC: 20 MG/DL — SIGNIFICANT CHANGE UP (ref 7–23)
CALCIUM SERPL-MCNC: 8.8 MG/DL — SIGNIFICANT CHANGE UP (ref 8.4–10.5)
CHLORIDE SERPL-SCNC: 98 MMOL/L — SIGNIFICANT CHANGE UP (ref 96–108)
CO2 SERPL-SCNC: 25 MMOL/L — SIGNIFICANT CHANGE UP (ref 22–31)
COLOR SPEC: YELLOW — SIGNIFICANT CHANGE UP
CREAT SERPL-MCNC: 0.92 MG/DL — SIGNIFICANT CHANGE UP (ref 0.5–1.3)
DIFF PNL FLD: NEGATIVE — SIGNIFICANT CHANGE UP
EGFR: 95 ML/MIN/1.73M2 — SIGNIFICANT CHANGE UP
EOSINOPHIL # BLD AUTO: 0 K/UL — SIGNIFICANT CHANGE UP (ref 0–0.5)
EOSINOPHIL NFR BLD AUTO: 0 % — SIGNIFICANT CHANGE UP (ref 0–6)
GLUCOSE SERPL-MCNC: 119 MG/DL — HIGH (ref 70–99)
GLUCOSE UR QL: NEGATIVE — SIGNIFICANT CHANGE UP
HCT VFR BLD CALC: 31.4 % — LOW (ref 39–50)
HGB BLD-MCNC: 9.7 G/DL — LOW (ref 13–17)
IMM GRANULOCYTES NFR BLD AUTO: 0.4 % — SIGNIFICANT CHANGE UP (ref 0–1.5)
KETONES UR-MCNC: NEGATIVE — SIGNIFICANT CHANGE UP
LEUKOCYTE ESTERASE UR-ACNC: NEGATIVE — SIGNIFICANT CHANGE UP
LYMPHOCYTES # BLD AUTO: 0.54 K/UL — LOW (ref 1–3.3)
LYMPHOCYTES # BLD AUTO: 4 % — LOW (ref 13–44)
MCHC RBC-ENTMCNC: 25.4 PG — LOW (ref 27–34)
MCHC RBC-ENTMCNC: 30.9 GM/DL — LOW (ref 32–36)
MCV RBC AUTO: 82.2 FL — SIGNIFICANT CHANGE UP (ref 80–100)
MONOCYTES # BLD AUTO: 1.17 K/UL — HIGH (ref 0–0.9)
MONOCYTES NFR BLD AUTO: 8.7 % — SIGNIFICANT CHANGE UP (ref 2–14)
NEUTROPHILS # BLD AUTO: 11.71 K/UL — HIGH (ref 1.8–7.4)
NEUTROPHILS NFR BLD AUTO: 86.8 % — HIGH (ref 43–77)
NITRITE UR-MCNC: NEGATIVE — SIGNIFICANT CHANGE UP
NRBC # BLD: 0 /100 WBCS — SIGNIFICANT CHANGE UP (ref 0–0)
PH UR: 6 — SIGNIFICANT CHANGE UP (ref 5–8)
PLATELET # BLD AUTO: 309 K/UL — SIGNIFICANT CHANGE UP (ref 150–400)
POTASSIUM SERPL-MCNC: 4.6 MMOL/L — SIGNIFICANT CHANGE UP (ref 3.5–5.3)
POTASSIUM SERPL-SCNC: 4.6 MMOL/L — SIGNIFICANT CHANGE UP (ref 3.5–5.3)
PROCALCITONIN SERPL-MCNC: 0.32 NG/ML — HIGH (ref 0.02–0.1)
PROT UR-MCNC: SIGNIFICANT CHANGE UP
RBC # BLD: 3.82 M/UL — LOW (ref 4.2–5.8)
RBC # FLD: 29.6 % — HIGH (ref 10.3–14.5)
SODIUM SERPL-SCNC: 135 MMOL/L — SIGNIFICANT CHANGE UP (ref 135–145)
SP GR SPEC: 1.02 — SIGNIFICANT CHANGE UP (ref 1.01–1.02)
UROBILINOGEN FLD QL: NEGATIVE — SIGNIFICANT CHANGE UP
WBC # BLD: 13.48 K/UL — HIGH (ref 3.8–10.5)
WBC # FLD AUTO: 13.48 K/UL — HIGH (ref 3.8–10.5)

## 2022-03-16 PROCEDURE — 99223 1ST HOSP IP/OBS HIGH 75: CPT

## 2022-03-16 PROCEDURE — 93306 TTE W/DOPPLER COMPLETE: CPT | Mod: 26

## 2022-03-16 RX ORDER — MORPHINE SULFATE 50 MG/1
4 CAPSULE, EXTENDED RELEASE ORAL EVERY 4 HOURS
Refills: 0 | Status: DISCONTINUED | OUTPATIENT
Start: 2022-03-16 | End: 2022-03-16

## 2022-03-16 RX ORDER — OXYCODONE HYDROCHLORIDE 5 MG/1
5 TABLET ORAL EVERY 4 HOURS
Refills: 0 | Status: DISCONTINUED | OUTPATIENT
Start: 2022-03-16 | End: 2022-03-17

## 2022-03-16 RX ORDER — CODEINE SULFATE 60 MG/1
15 TABLET ORAL EVERY 6 HOURS
Refills: 0 | Status: DISCONTINUED | OUTPATIENT
Start: 2022-03-16 | End: 2022-03-17

## 2022-03-16 RX ORDER — MORPHINE SULFATE 50 MG/1
4 CAPSULE, EXTENDED RELEASE ORAL EVERY 4 HOURS
Refills: 0 | Status: DISCONTINUED | OUTPATIENT
Start: 2022-03-16 | End: 2022-03-17

## 2022-03-16 RX ADMIN — Medication 200 MILLIGRAM(S): at 06:33

## 2022-03-16 RX ADMIN — Medication 30 MILLIGRAM(S): at 17:38

## 2022-03-16 RX ADMIN — MORPHINE SULFATE 4 MILLIGRAM(S): 50 CAPSULE, EXTENDED RELEASE ORAL at 22:16

## 2022-03-16 RX ADMIN — MORPHINE SULFATE 2 MILLIGRAM(S): 50 CAPSULE, EXTENDED RELEASE ORAL at 11:51

## 2022-03-16 RX ADMIN — CODEINE SULFATE 15 MILLIGRAM(S): 60 TABLET ORAL at 23:40

## 2022-03-16 RX ADMIN — Medication 1 TABLET(S): at 17:38

## 2022-03-16 RX ADMIN — Medication 200 MILLIGRAM(S): at 13:23

## 2022-03-16 RX ADMIN — MORPHINE SULFATE 2 MILLIGRAM(S): 50 CAPSULE, EXTENDED RELEASE ORAL at 12:21

## 2022-03-16 RX ADMIN — POLYETHYLENE GLYCOL 3350 17 GRAM(S): 17 POWDER, FOR SOLUTION ORAL at 11:51

## 2022-03-16 RX ADMIN — ENOXAPARIN SODIUM 40 MILLIGRAM(S): 100 INJECTION SUBCUTANEOUS at 21:45

## 2022-03-16 RX ADMIN — PANTOPRAZOLE SODIUM 40 MILLIGRAM(S): 20 TABLET, DELAYED RELEASE ORAL at 06:33

## 2022-03-16 RX ADMIN — LOSARTAN POTASSIUM 100 MILLIGRAM(S): 100 TABLET, FILM COATED ORAL at 06:33

## 2022-03-16 RX ADMIN — Medication 30 MILLIGRAM(S): at 06:33

## 2022-03-16 RX ADMIN — MORPHINE SULFATE 2 MILLIGRAM(S): 50 CAPSULE, EXTENDED RELEASE ORAL at 06:34

## 2022-03-16 RX ADMIN — MORPHINE SULFATE 4 MILLIGRAM(S): 50 CAPSULE, EXTENDED RELEASE ORAL at 21:46

## 2022-03-16 RX ADMIN — Medication 200 MILLIGRAM(S): at 21:45

## 2022-03-16 RX ADMIN — CODEINE SULFATE 15 MILLIGRAM(S): 60 TABLET ORAL at 17:38

## 2022-03-16 RX ADMIN — Medication 0.5 MILLIGRAM(S): at 21:45

## 2022-03-16 NOTE — PROGRESS NOTE ADULT - SUBJECTIVE AND OBJECTIVE BOX
Patient is a 62y old  Male who presents with a chief complaint of pericardial effusion (16 Mar 2022 08:56)      SUBJECTIVE / OVERNIGHT EVENTS:    Patient seen and examined.       Vital Signs Last 24 Hrs  T(C): 36.6 (16 Mar 2022 06:01), Max: 38.6 (15 Mar 2022 21:31)  T(F): 97.9 (16 Mar 2022 06:01), Max: 101.4 (15 Mar 2022 21:31)  HR: 104 (16 Mar 2022 06:01) (98 - 123)  BP: 113/75 (16 Mar 2022 06:01) (99/63 - 156/102)  BP(mean): 87 (15 Mar 2022 18:19) (87 - 118)  RR: 20 (16 Mar 2022 06:01) (15 - 25)  SpO2: 100% (16 Mar 2022 06:01) (95% - 100%)  I&O's Summary    15 Mar 2022 07:01  -  16 Mar 2022 07:00  --------------------------------------------------------  IN: 400 mL / OUT: 450 mL / NET: -50 mL        PE:  GENERAL: NAD, AAOx3  HEAD:  Atraumatic, Normocephalic  EYES: EOMI, PERRLA, conjunctiva and sclera clear  NECK: Supple, No JVD  CHEST/LUNG: CTABL, No wheeze  HEART: Regular rate and rhythm; + murmur  ABDOMEN: Soft, Nontender, Nondistended; Bowel sounds present  EXTREMITIES:  2+ Peripheral Pulses, No clubbing, cyanosis, or edema  SKIN: No rashes or lesions  NEURO: No focal deficits    LABS:                        9.7    13.48 )-----------( 309      ( 16 Mar 2022 06:50 )             31.4     03-16    135  |  98  |  20  ----------------------------<  119<H>  4.6   |  25  |  0.92    Ca    8.8      16 Mar 2022 06:47    TPro  6.4  /  Alb  3.5  /  TBili  0.8  /  DBili  x   /  AST  21  /  ALT  20  /  AlkPhos  85  03-15    PT/INR - ( 15 Mar 2022 16:56 )   PT: 14.9 sec;   INR: 1.28 ratio         PTT - ( 15 Mar 2022 16:56 )  PTT:28.3 sec  CAPILLARY BLOOD GLUCOSE                RADIOLOGY & ADDITIONAL TESTS:    Imaging Personally Reviewed:  [x] YES  [ ] NO    Consultant(s) Notes Reviewed:  [x] YES  [ ] NO    MEDICATIONS  (STANDING):  chlorproMAZINE    Tablet 10 milliGRAM(s) Oral three times a day  clonazePAM  Tablet 0.5 milliGRAM(s) Oral at bedtime  enoxaparin Injectable 40 milliGRAM(s) SubCutaneous every 24 hours  losartan 100 milliGRAM(s) Oral daily  pantoprazole    Tablet 40 milliGRAM(s) Oral before breakfast  polyethylene glycol 3350 17 Gram(s) Oral daily  predniSONE   Tablet 30 milliGRAM(s) Oral two times a day    MEDICATIONS  (PRN):  acetaminophen     Tablet .. 650 milliGRAM(s) Oral every 6 hours PRN Temp greater or equal to 38C (100.4F), Mild Pain (1 - 3)  aluminum hydroxide/magnesium hydroxide/simethicone Suspension 30 milliLiter(s) Oral every 4 hours PRN Dyspepsia  cyclobenzaprine 10 milliGRAM(s) Oral three times a day PRN Muscle Spasm  guaiFENesin Oral Liquid (Sugar-Free) 200 milliGRAM(s) Oral every 6 hours PRN Cough  hydrocodone/homatropine Syrup 5 milliLiter(s) Oral every 6 hours PRN Cough  melatonin 3 milliGRAM(s) Oral at bedtime PRN Insomnia  morphine  - Injectable 2 milliGRAM(s) IV Push every 4 hours PRN Severe Pain (7 - 10)  ondansetron Injectable 4 milliGRAM(s) IV Push every 8 hours PRN Nausea and/or Vomiting  oxyCODONE    IR 5 milliGRAM(s) Oral every 6 hours PRN Moderate Pain (4 - 6)      Care Discussed with Consultants/Other Providers [x] YES  [ ] NO    HEALTH ISSUES - PROBLEM Dx:  Pericardial effusion    Hypertension    Gastric cancer    Prophylactic measure    2019 novel coronavirus disease (COVID-19)    Chest pain         Patient is a 62y old  Male who presents with a chief complaint of pericardial effusion (16 Mar 2022 08:56)      SUBJECTIVE / OVERNIGHT EVENTS:    Patient seen and examined. was told to come back to hospital but also feeling worsening sob and cough. co uncontrolled pain.       Vital Signs Last 24 Hrs  T(C): 36.6 (16 Mar 2022 06:01), Max: 38.6 (15 Mar 2022 21:31)  T(F): 97.9 (16 Mar 2022 06:01), Max: 101.4 (15 Mar 2022 21:31)  HR: 104 (16 Mar 2022 06:01) (98 - 123)  BP: 113/75 (16 Mar 2022 06:01) (99/63 - 156/102)  BP(mean): 87 (15 Mar 2022 18:19) (87 - 118)  RR: 20 (16 Mar 2022 06:01) (15 - 25)  SpO2: 100% (16 Mar 2022 06:01) (95% - 100%)  I&O's Summary    15 Mar 2022 07:01  -  16 Mar 2022 07:00  --------------------------------------------------------  IN: 400 mL / OUT: 450 mL / NET: -50 mL        PE:  GENERAL: NAD, AAOx3  HEAD:  Atraumatic, Normocephalic  NECK: No JVD  CHEST/LUNG: CTABL, No wheeze  HEART: Regular rate and rhythm; no murmur  ABDOMEN: Soft, Nontender, Nondistended; Bowel sounds present  EXTREMITIES:  2+ Peripheral Pulses, No clubbing, cyanosis, or edema  SKIN: No rashes or lesions  NEURO: No focal deficits    LABS:                        9.7    13.48 )-----------( 309      ( 16 Mar 2022 06:50 )             31.4     03-16    135  |  98  |  20  ----------------------------<  119<H>  4.6   |  25  |  0.92    Ca    8.8      16 Mar 2022 06:47    TPro  6.4  /  Alb  3.5  /  TBili  0.8  /  DBili  x   /  AST  21  /  ALT  20  /  AlkPhos  85  03-15    PT/INR - ( 15 Mar 2022 16:56 )   PT: 14.9 sec;   INR: 1.28 ratio         PTT - ( 15 Mar 2022 16:56 )  PTT:28.3 sec  CAPILLARY BLOOD GLUCOSE                RADIOLOGY & ADDITIONAL TESTS:    Imaging Personally Reviewed:  [x] YES  [ ] NO    Consultant(s) Notes Reviewed:  [x] YES  [ ] NO    MEDICATIONS  (STANDING):  chlorproMAZINE    Tablet 10 milliGRAM(s) Oral three times a day  clonazePAM  Tablet 0.5 milliGRAM(s) Oral at bedtime  enoxaparin Injectable 40 milliGRAM(s) SubCutaneous every 24 hours  losartan 100 milliGRAM(s) Oral daily  pantoprazole    Tablet 40 milliGRAM(s) Oral before breakfast  polyethylene glycol 3350 17 Gram(s) Oral daily  predniSONE   Tablet 30 milliGRAM(s) Oral two times a day    MEDICATIONS  (PRN):  acetaminophen     Tablet .. 650 milliGRAM(s) Oral every 6 hours PRN Temp greater or equal to 38C (100.4F), Mild Pain (1 - 3)  aluminum hydroxide/magnesium hydroxide/simethicone Suspension 30 milliLiter(s) Oral every 4 hours PRN Dyspepsia  cyclobenzaprine 10 milliGRAM(s) Oral three times a day PRN Muscle Spasm  guaiFENesin Oral Liquid (Sugar-Free) 200 milliGRAM(s) Oral every 6 hours PRN Cough  hydrocodone/homatropine Syrup 5 milliLiter(s) Oral every 6 hours PRN Cough  melatonin 3 milliGRAM(s) Oral at bedtime PRN Insomnia  morphine  - Injectable 2 milliGRAM(s) IV Push every 4 hours PRN Severe Pain (7 - 10)  ondansetron Injectable 4 milliGRAM(s) IV Push every 8 hours PRN Nausea and/or Vomiting  oxyCODONE    IR 5 milliGRAM(s) Oral every 6 hours PRN Moderate Pain (4 - 6)      Care Discussed with Consultants/Other Providers [x] YES  [ ] NO    HEALTH ISSUES - PROBLEM Dx:  Pericardial effusion    Hypertension    Gastric cancer    Prophylactic measure    2019 novel coronavirus disease (COVID-19)    Chest pain

## 2022-03-16 NOTE — CONSULT NOTE ADULT - ASSESSMENT
61yo M w/ PMHx of Stage IV gastric adenocarcinoma w/ mets to peritoneum, HTN, Hx of PE (s/p Lovenox & IVC filter) sent in by PMD after TTE was found to have a moderate pericardial effusion with echocardiographic evidence of tamponade.    - 3/14 echo with moderate pericardial effusion with echocardiographic evidence of tamponade  - BP is relatively stale.   - would hold losartan to allow for some permissive htn  - avoid preload reduction  - repeat limited echo today  - please check ekg.   - there were no EKG signs of pericarditis previously.   - his cp is very pleuritic and seemingly was associated with his COVID infection. Though cannot completely r/o presence of pericarditis.   - would empirically treat but worried that this could also be a malignant effusion.   - if repeat echo with still signs of tamponade would have ct sx consult for potential window  - defer NSAID and colchicine for now.   - would consider doing US of chest to see size of pleural effusion. if large enough would consider draining even if to provide more comfort  - fu heme/onc and pulm  - Monitor and replete electrolytes. Keep K>4.0 and Mg>2.0.   - Further cardiac workup will depend on clinical course.

## 2022-03-16 NOTE — CONSULT NOTE ADULT - PROBLEM SELECTOR RECOMMENDATION 2
CXR with trace to small b/l pl effusions, similar in size to prior CXR and CTA chest  -Normoxic  -No indication for drainage at this time.
Pt reports chest and abdominal pain triggered by coughing  Treat cough as noted above  Continue oxycodone 5mg PO q4h PRN moderate pain (home medication)  Increase IV morphine to 4mg q4h PRN severe pain  bowel regimen

## 2022-03-16 NOTE — CONSULT NOTE ADULT - PROBLEM SELECTOR RECOMMENDATION 3
CTA chest 3/7 with conglomerate lymphadenopathy in the mediastinal along with thickening of the bronchovascular interstitium suggestive of lymphangitic spread  -Pt normoxic but with persistent cough, SOB. Cough somewhat improved with Hycodan, codeine, steroids on prior admission  -C/w Prednisone 30 mg PO BID  -Suggest Bactrim for PCP ppx while on steroids   -Continue Hycodan 5cc q6h (hold for sedation), start Codeine 15mg PO q6h (hold for sedation) as this improved patient's cough in past   -Continue Protonix 40mg PO qd while on steroids. CTA chest 3/7 with conglomerate lymphadenopathy in the mediastinal along with thickening of the bronchovascular interstitium suggestive of lymphangitic spread  -Pt normoxic but with persistent cough, SOB. Cough improved with Hycodan, codeine, steroids on prior admission  -C/w Prednisone 30 mg PO BID  -Suggest Bactrim for PCP ppx while on steroids   -Continue Hycodan 5cc q6h (hold for sedation), start Codeine 15mg PO q6h (hold for sedation) as this improved patient's cough in past   -Continue Protonix 40mg PO qd while on steroids.

## 2022-03-16 NOTE — CONSULT NOTE ADULT - PROBLEM SELECTOR RECOMMENDATION 9
TTE 3/14 with moderate pericardial effusion with evidence of pericardial tamponade  -Repeat TTE 3/16 with small pericardial effusion, smaller compared to prior exam   -Cards f/u.

## 2022-03-16 NOTE — CONSULT NOTE ADULT - SUBJECTIVE AND OBJECTIVE BOX
63 y/o M with PMH of Stage IV gastric adenocarcinoma w/ mets to peritoneum (dx 2021), HTN, Hx of PE (s/p Lovenox & IVC filter - now off Lovenox d/t to recent melena). S/p recent admission (3 Presents with cough/SOB. He went to [facility] and imaging there showed worsening disease progression and patient was prescribed Augmentin and discharged home. Now with continued symptoms, worsening cough, low grade temp. Found to be COVID positive in ED, CTA chest negative for PE, lymphangitic spread. Endorses pale yellow, clear secretions, +FRAIRE and worsening cough. Denies CP, pleuritic CP.                   PULMONARY CONSULT    Initial HPI on admission:  HPI:  61yo M w/ PMHx of Stage IV gastric adenocarcinoma w/ mets to peritoneum, HTN, Hx of PE (s/p Lovenox & IVC filter) sent in by PMD after TTE was found to have a moderate pericardial effusion with echocardiographic evidence of tamponade. He is complaining constant chest pain with severe unrelenting cough, no lightheadedness or diaphoresis. Cough sometimes responds to Robitussin AC but not to tessalon perles. He takes Oxycodone for pain.    Discharged yesterday for cough/SOB, found to be anemic and COVID positive. Seen and evaluated by ID. s/p RDV and steroid, monitor off ABx. Elevated d-dimer, US LE neg for DVT. Likely elevated in setting of malignancy and COVID. Now down-trending. CTA neg for PE. Patient also seen by Neuro for Left food drop. CTH neg. MRI brain neg  MRI L spine with non specific enhancement L5/S1; f/u in 6 months recommended. L foot Dorsi flexion weakness of unclear etiology;  chemo can cause neuropathy not weakness. Seen by Ortho for AFO brace.  (15 Mar 2022 17:59)      BRIEF HOSPITAL COURSE: ***    PAST MEDICAL & SURGICAL HISTORY:  Gastric adenocarcinoma  stage 4    Hypertension    S/P IVC filter    Anorectal abscess      Allergies    No Known Allergies    Intolerances      FAMILY HISTORY:  FHx: stroke    FH: colon cancer      Social history:     Review of Systems:  CONSTITUTIONAL: No fever, chills, or fatigue  EYES: No eye pain, visual disturbances, or discharge  ENMT:  No difficulty hearing, tinnitus, vertigo; No sinus or throat pain  NECK: No pain or stiffness  RESPIRATORY: Per above  CARDIOVASCULAR: No chest pain, palpitations, dizziness, or leg swelling  GASTROINTESTINAL: No abdominal or epigastric pain. No nausea, vomiting, or hematemesis; No diarrhea or constipation. No melena or hematochezia.  GENITOURINARY: No dysuria, frequency, hematuria, or incontinence  NEUROLOGICAL: No headaches, memory loss, loss of strength, numbness, or tremors  SKIN: No itching, burning, rashes, or lesions   MUSCULOSKELETAL: No joint pain or swelling; No muscle, back, or extremity pain  PSYCHIATRIC: No depression, anxiety, mood swings, or difficulty sleeping      Medications:  MEDICATIONS  (STANDING):  chlorproMAZINE    Tablet 10 milliGRAM(s) Oral three times a day  clonazePAM  Tablet 0.5 milliGRAM(s) Oral at bedtime  enoxaparin Injectable 40 milliGRAM(s) SubCutaneous every 24 hours  losartan 100 milliGRAM(s) Oral daily  pantoprazole    Tablet 40 milliGRAM(s) Oral before breakfast  polyethylene glycol 3350 17 Gram(s) Oral daily  predniSONE   Tablet 30 milliGRAM(s) Oral two times a day    MEDICATIONS  (PRN):  acetaminophen     Tablet .. 650 milliGRAM(s) Oral every 6 hours PRN Temp greater or equal to 38C (100.4F), Mild Pain (1 - 3)  aluminum hydroxide/magnesium hydroxide/simethicone Suspension 30 milliLiter(s) Oral every 4 hours PRN Dyspepsia  cyclobenzaprine 10 milliGRAM(s) Oral three times a day PRN Muscle Spasm  guaiFENesin Oral Liquid (Sugar-Free) 200 milliGRAM(s) Oral every 6 hours PRN Cough  hydrocodone/homatropine Syrup 5 milliLiter(s) Oral every 6 hours PRN Cough  melatonin 3 milliGRAM(s) Oral at bedtime PRN Insomnia  morphine  - Injectable 2 milliGRAM(s) IV Push every 4 hours PRN Severe Pain (7 - 10)  ondansetron Injectable 4 milliGRAM(s) IV Push every 8 hours PRN Nausea and/or Vomiting  oxyCODONE    IR 5 milliGRAM(s) Oral every 6 hours PRN Moderate Pain (4 - 6)            Vital Signs Last 24 Hrs  T(C): 36.6 (16 Mar 2022 06:01), Max: 38.6 (15 Mar 2022 21:31)  T(F): 97.9 (16 Mar 2022 06:01), Max: 101.4 (15 Mar 2022 21:31)  HR: 104 (16 Mar 2022 06:01) (98 - 123)  BP: 113/75 (16 Mar 2022 06:01) (99/63 - 156/102)  BP(mean): 87 (15 Mar 2022 18:19) (87 - 118)  RR: 20 (16 Mar 2022 06:01) (15 - 25)  SpO2: 100% (16 Mar 2022 06:01) (95% - 100%)      VBG pH 7.34 03-15 @ 16:19  VBG pCO2 48 03-15 @ 16:19  VBG O2 sat 68.9 03-15 @ 16:19  VBG lactate 3.2 15 @ 16:19        15 @ 07:01  -  03-16 @ 07:00  --------------------------------------------------------  IN: 400 mL / OUT: 450 mL / NET: -50 mL          LABS:                        9.7    13.48 )-----------( 309      ( 16 Mar 2022 06:50 )             31.4     03-16    135  |  98  |  20  ----------------------------<  119<H>  4.6   |  25  |  0.92    Ca    8.8      16 Mar 2022 06:47    TPro  6.4  /  Alb  3.5  /  TBili  0.8  /  DBili  x   /  AST  21  /  ALT  20  /  AlkPhos  85  03-15          CAPILLARY BLOOD GLUCOSE        PT/INR - ( 15 Mar 2022 16:56 )   PT: 14.9 sec;   INR: 1.28 ratio         PTT - ( 15 Mar 2022 16:56 )  PTT:28.3 sec  Urinalysis Basic - ( 16 Mar 2022 11:47 )    Color: Yellow / Appearance: Clear / S.023 / pH: x  Gluc: x / Ketone: Negative  / Bili: Negative / Urobili: Negative   Blood: x / Protein: Trace / Nitrite: Negative   Leuk Esterase: Negative / RBC: x / WBC x   Sq Epi: x / Non Sq Epi: x / Bacteria: x                    CULTURES: (if applicable)     (collected 22 @ 18:32)  Source: .Blood Blood-Venous  Final Report (22 @ 19:00):    No Growth Final     (collected 22 @ 18:31)  Source: .Blood Blood-Venous  Final Report (22 @ 19:00):    No Growth Final        Culture - Urine (collected 22 @ 22:12)  Source: Clean Catch Clean Catch (Midstream)  Final Report (22 @ 17:11):    <10,000 CFU/mL Normal Urogenital Yadira                    Physical Examination:    General: No acute distress.      HEENT: Pupils equal, reactive to light.  Symmetric.    PULM: Clear to auscultation bilaterally, no significant sputum production    CVS: S1, S2    ABD: Soft, nondistended, nontender, normoactive bowel sounds, no masses    EXT: No edema, nontender    SKIN: Warm and well perfused, no rashes noted.    NEURO: Alert, oriented, interactive, nonfocal    RADIOLOGY REVIEWED  CXR:    CT chest:    TTE:   PULMONARY CONSULT      HPI: 61 y/o M with PMH of Stage IV gastric adenocarcinoma w/ mets to peritoneum (dx 2021), HTN, Hx of PE (s/p Lovenox & IVC filter - now off Lovenox d/t to recent melena). Recent admission (3/7-3/14) for cough/SOB, found to be COVID+ in ED with lymphangitic spread on CTA chest. S/p course of Remdesivir x5 days, IV steroids for lymphangitic spread. Transitioned to PO Prednisone on discharge with plans for close outpatient f/u. Course c/b elevated ddimer, CTA chest negative for PE, LE duplex negative for DVT. Discharged home on 3/14, called by PMD to return due to moderate pericardial effusion with evidence of tamponade physiology on recent TTE. Pulmonary called to consult for COVID, lymphangitis spread. Endorses worsening cough and pleuritic chest pain compared to prior admission, +FRAIRE. O2 sats 97% on RA.     PAST MEDICAL & SURGICAL HISTORY:  Gastric adenocarcinoma  Hypertension  S/P IVC filter  Anorectal abscess    No Known Allergies    FAMILY HISTORY:  FHx: stroke    FH: colon cancer    Social history: never smoker    Review of Systems:  CONSTITUTIONAL: No fever, chills, or fatigue  EYES: No eye pain, visual disturbances, or discharge  ENMT:  No difficulty hearing, tinnitus, vertigo; No sinus or throat pain  NECK: No pain or stiffness  RESPIRATORY: Per above  CARDIOVASCULAR: Per above  GASTROINTESTINAL: No abdominal or epigastric pain. No nausea, vomiting, or hematemesis; No diarrhea or constipation. No melena or hematochezia.  GENITOURINARY: No dysuria, frequency, hematuria, or incontinence  NEUROLOGICAL: No headaches, memory loss, loss of strength, numbness, or tremors  SKIN: No itching, burning, rashes, or lesions   MUSCULOSKELETAL: No joint pain or swelling; No muscle, back, or extremity pain  PSYCHIATRIC: No depression, anxiety, mood swings, or difficulty sleeping    Medications:  MEDICATIONS  (STANDING):  chlorproMAZINE    Tablet 10 milliGRAM(s) Oral three times a day  clonazePAM  Tablet 0.5 milliGRAM(s) Oral at bedtime  enoxaparin Injectable 40 milliGRAM(s) SubCutaneous every 24 hours  losartan 100 milliGRAM(s) Oral daily  pantoprazole    Tablet 40 milliGRAM(s) Oral before breakfast  polyethylene glycol 3350 17 Gram(s) Oral daily  predniSONE   Tablet 30 milliGRAM(s) Oral two times a day    MEDICATIONS  (PRN):  acetaminophen     Tablet .. 650 milliGRAM(s) Oral every 6 hours PRN Temp greater or equal to 38C (100.4F), Mild Pain (1 - 3)  aluminum hydroxide/magnesium hydroxide/simethicone Suspension 30 milliLiter(s) Oral every 4 hours PRN Dyspepsia  cyclobenzaprine 10 milliGRAM(s) Oral three times a day PRN Muscle Spasm  guaiFENesin Oral Liquid (Sugar-Free) 200 milliGRAM(s) Oral every 6 hours PRN Cough  hydrocodone/homatropine Syrup 5 milliLiter(s) Oral every 6 hours PRN Cough  melatonin 3 milliGRAM(s) Oral at bedtime PRN Insomnia  morphine  - Injectable 2 milliGRAM(s) IV Push every 4 hours PRN Severe Pain (7 - 10)  ondansetron Injectable 4 milliGRAM(s) IV Push every 8 hours PRN Nausea and/or Vomiting  oxyCODONE    IR 5 milliGRAM(s) Oral every 6 hours PRN Moderate Pain (4 - 6)    Vital Signs Last 24 Hrs  T(C): 36.6 (16 Mar 2022 06:01), Max: 38.6 (15 Mar 2022 21:31)  T(F): 97.9 (16 Mar 2022 06:01), Max: 101.4 (15 Mar 2022 21:31)  HR: 104 (16 Mar 2022 06:01) (98 - 123)  BP: 113/75 (16 Mar 2022 06:01) (99/63 - 156/102)  BP(mean): 87 (15 Mar 2022 18:19) (87 - 118)  RR: 20 (16 Mar 2022 06:01) (15 - 25)  SpO2: 100% (16 Mar 2022 06:01) (95% - 100%)    VBG pH 7.34 15 @ 16:19  VBG pCO2 48 -15 @ 16:19  VBG O2 sat 68.9 03-15 @ 16:19  VBG lactate 3.2 -15 @ 16:19    03-15 @ 07:01  -  03-16 @ 07:00  --------------------------------------------------------  IN: 400 mL / OUT: 450 mL / NET: -50 mL    LABS:                        9.7    13.48 )-----------( 309      ( 16 Mar 2022 06:50 )             31.4     03-    135  |  98  |  20  ----------------------------<  119<H>  4.6   |  25  |  0.92    Ca    8.8      16 Mar 2022 06:47    TPro  6.4  /  Alb  3.5  /  TBili  0.8  /  DBili  x   /  AST  21  /  ALT  20  /  AlkPhos  85  03-15    PT/INR - ( 15 Mar 2022 16:56 )   PT: 14.9 sec;   INR: 1.28 ratio    PTT - ( 15 Mar 2022 16:56 )  PTT:28.3 sec  Urinalysis Basic - ( 16 Mar 2022 11:47 )    Color: Yellow / Appearance: Clear / S.023 / pH: x  Gluc: x / Ketone: Negative  / Bili: Negative / Urobili: Negative   Blood: x / Protein: Trace / Nitrite: Negative   Leuk Esterase: Negative / RBC: x / WBC x   Sq Epi: x / Non Sq Epi: x / Bacteria: x    CULTURES:      (collected 22 @ 18:32)  Source: .Blood Blood-Venous  Final Report (22 @ 19:00):    No Growth Final     (collected 22 @ 18:31)  Source: .Blood Blood-Venous  Final Report (22 @ 19:00):    No Growth Final        Culture - Urine (collected 22 @ 22:12)  Source: Clean Catch Clean Catch (Midstream)  Final Report (22 @ 17:11):    <10,000 CFU/mL Normal Urogenital Yadira                    Physical Examination:    General: No acute distress.      HEENT: Pupils equal, reactive to light.  Symmetric.    PULM: Clear to auscultation bilaterally, no significant sputum production    CVS: S1, S2    ABD: Soft, nondistended, nontender, normoactive bowel sounds, no masses    EXT: No edema, nontender    SKIN: Warm and well perfused, no rashes noted.    NEURO: Alert, oriented, interactive, nonfocal    RADIOLOGY REVIEWED  CXR:    CT chest:    TTE:   PULMONARY CONSULT    HPI: 61 y/o M with PMH of Stage IV gastric adenocarcinoma w/ mets to peritoneum (dx 2021), HTN, Hx of PE (s/p Lovenox & IVC filter - now off Lovenox d/t to recent melena). Recent admission (3/7-3/14) for cough/SOB, found to be COVID+ in ED with lymphangitic spread on CTA chest. S/p course of Remdesivir x5 days, IV steroids for lymphangitic spread. Transitioned to PO Prednisone on discharge with plans for close outpatient f/u. Course c/b elevated ddimer, CTA chest negative for PE, LE duplex negative for DVT. Discharged home on 3/14, called by PMD to return due to moderate pericardial effusion with evidence of tamponade physiology on recent TTE. Pulmonary called to consult for COVID, lymphangitis spread. Endorses worsening cough and pleuritic chest pain compared to prior admission, +FRAIRE. O2 sats 97% on RA.     PAST MEDICAL & SURGICAL HISTORY:  Gastric adenocarcinoma  Hypertension  S/P IVC filter  Anorectal abscess    No Known Allergies    FAMILY HISTORY:  FHx: stroke    FH: colon cancer    Social history: never smoker    Review of Systems:  CONSTITUTIONAL: No fever, chills, or fatigue  EYES: No eye pain, visual disturbances, or discharge  ENMT:  No difficulty hearing, tinnitus, vertigo; No sinus or throat pain  NECK: No pain or stiffness  RESPIRATORY: Per above  CARDIOVASCULAR: Per above  GASTROINTESTINAL: No abdominal or epigastric pain. No nausea, vomiting, or hematemesis; No diarrhea or constipation. No melena or hematochezia.  GENITOURINARY: No dysuria, frequency, hematuria, or incontinence  NEUROLOGICAL: No headaches, memory loss, loss of strength, numbness, or tremors  SKIN: No itching, burning, rashes, or lesions   MUSCULOSKELETAL: No joint pain or swelling; No muscle, back, or extremity pain  PSYCHIATRIC: No depression, anxiety, mood swings, or difficulty sleeping    Medications:  MEDICATIONS  (STANDING):  chlorproMAZINE    Tablet 10 milliGRAM(s) Oral three times a day  clonazePAM  Tablet 0.5 milliGRAM(s) Oral at bedtime  enoxaparin Injectable 40 milliGRAM(s) SubCutaneous every 24 hours  losartan 100 milliGRAM(s) Oral daily  pantoprazole    Tablet 40 milliGRAM(s) Oral before breakfast  polyethylene glycol 3350 17 Gram(s) Oral daily  predniSONE   Tablet 30 milliGRAM(s) Oral two times a day    MEDICATIONS  (PRN):  acetaminophen     Tablet .. 650 milliGRAM(s) Oral every 6 hours PRN Temp greater or equal to 38C (100.4F), Mild Pain (1 - 3)  aluminum hydroxide/magnesium hydroxide/simethicone Suspension 30 milliLiter(s) Oral every 4 hours PRN Dyspepsia  cyclobenzaprine 10 milliGRAM(s) Oral three times a day PRN Muscle Spasm  guaiFENesin Oral Liquid (Sugar-Free) 200 milliGRAM(s) Oral every 6 hours PRN Cough  hydrocodone/homatropine Syrup 5 milliLiter(s) Oral every 6 hours PRN Cough  melatonin 3 milliGRAM(s) Oral at bedtime PRN Insomnia  morphine  - Injectable 2 milliGRAM(s) IV Push every 4 hours PRN Severe Pain (7 - 10)  ondansetron Injectable 4 milliGRAM(s) IV Push every 8 hours PRN Nausea and/or Vomiting  oxyCODONE    IR 5 milliGRAM(s) Oral every 6 hours PRN Moderate Pain (4 - 6)    Vital Signs Last 24 Hrs  T(C): 36.6 (16 Mar 2022 06:01), Max: 38.6 (15 Mar 2022 21:31)  T(F): 97.9 (16 Mar 2022 06:01), Max: 101.4 (15 Mar 2022 21:31)  HR: 104 (16 Mar 2022 06:01) (98 - 123)  BP: 113/75 (16 Mar 2022 06:01) (99/63 - 156/102)  BP(mean): 87 (15 Mar 2022 18:19) (87 - 118)  RR: 20 (16 Mar 2022 06:01) (15 - 25)  SpO2: 100% (16 Mar 2022 06:01) (95% - 100%)    VBG pH 7.34 15 @ 16:19  VBG pCO2 48 -15 @ 16:19  VBG O2 sat 68.9 03-15 @ 16:19  VBG lactate 3.2 -15 @ 16:19    03-15 @ 07:01  -  03-16 @ 07:00  --------------------------------------------------------  IN: 400 mL / OUT: 450 mL / NET: -50 mL    LABS:                        9.7    13.48 )-----------( 309      ( 16 Mar 2022 06:50 )             31.4     03-    135  |  98  |  20  ----------------------------<  119<H>  4.6   |  25  |  0.92    Ca    8.8      16 Mar 2022 06:47    TPro  6.4  /  Alb  3.5  /  TBili  0.8  /  DBili  x   /  AST  21  /  ALT  20  /  AlkPhos  85  03-15    PT/INR - ( 15 Mar 2022 16:56 )   PT: 14.9 sec;   INR: 1.28 ratio    PTT - ( 15 Mar 2022 16:56 )  PTT:28.3 sec  Urinalysis Basic - ( 16 Mar 2022 11:47 )    Color: Yellow / Appearance: Clear / S.023 / pH: x  Gluc: x / Ketone: Negative  / Bili: Negative / Urobili: Negative   Blood: x / Protein: Trace / Nitrite: Negative   Leuk Esterase: Negative / RBC: x / WBC x   Sq Epi: x / Non Sq Epi: x / Bacteria: x    CULTURES:    (collected 22 @ 18:32)  Source: .Blood Blood-Venous  Final Report (22 @ 19:00):    No Growth Final     (collected 22 @ 18:31)  Source: .Blood Blood-Venous  Final Report (22 @ 19:00):    No Growth Final    Culture - Urine (collected 22 @ 22:12)  Source: Clean Catch Clean Catch (Midstream)  Final Report (22 @ 17:11):    <10,000 CFU/mL Normal Urogenital Yadira    Physical Examination:    General: No acute distress.      HEENT: Pupils equal, reactive to light.  Symmetric.    PULM: Coarse BS b/l     CVS: S1, S2    ABD: Soft, nondistended, nontender, normoactive bowel sounds, no masses    EXT: No edema, nontender    SKIN: Warm and well perfused, no rashes noted.    NEURO: Alert, oriented, interactive, nonfocal    RADIOLOGY REVIEWED  CXR: b/l opacities grossly unchanged, trace b/l pl effusion    CT chest: < from: CT Angio Chest PE Protocol w/ IV Cont (22 @ 16:19) >  INTERPRETATION:  Reason for Exam: Shortness of breath. chest pain.    CTA of the chest was performed from the thoracic inlet to the level of   the adrenal glands following IV contrast injection of  80 cc of Omnipaque   350. No immediate complications were reported.  MIP images were also   created and reviewed.    Comparison: Chest x-ray of same date    Tubes/Lines: Right Mediport catheter seen with tip in SVC    Mediastinum and Heart: Aorta and pulmonary arteries are normal in size.   Thyroid gland is unremarkable. There is conglomerate lymphadenopathy in   the mediastinum including the prevascular, right left paratracheal, AP   window subcarinal and right hilar stations. The largest area of   lymphadenopathy is conglomerate lymphadenopathy in the right hilum   measuring approximately 3 x 3.9 cm. There is narrowing of the proximal   right and left mainstem bronchi from conglomerate lymphadenopathy. No   pericardial effusion.    Lungs, Pleura, and Airways: There is no pulmonary embolus. Bilateral   thickening of the peribronchial vascular interstitium along with areas of   septal thickening suggestive of lymphangitic spread.    Small bilateral pleural effusions noted.    Visualized Abdomen: IVC filter is noted in place. A partially imaged left   adrenal mass measures 4.3 x 3.2 cm.    Bones and soft tissues: Unremarkable.    IMPRESSION:    No pulmonary embolus.    Conglomerate lymphadenopathy in the mediastinal along with thickening of   the bronchovascular interstitium suggestive of lymphangitic spread.    Small bilateral pleural effusions.    Partially imaged left adrenal mass measuring 4.3 x 3.2 cm consistent with   metastatic disease.    < end of copied text >    TTE: < from: Transthoracic Echocardiogram (22 @ 12:17) >  Dimensions:    Normal Values:  LA:     3.2    2.0 - 4.0 cm  Ao:     3.2    2.0 - 3.8 cm  SEPTUM: 0.9    0.6 - 1.2 cm  PWT:    0.9    0.6 - 1.1 cm  LVIDd:  4.1    3.0 - 5.6 cm  LVIDs:  2.2    1.8 - 4.0 cm  Derived variables:  LVMI: 66 g/m2  RWT: 0.43  Fractional short: 46 %  EF (Visual Estimate): 70-75 %  Doppler Peak Velocity (m/sec): AoV=1.5  ------------------------------------------------------------------------  Observations:  Mitral Valve: Mitral annular calcification, otherwise  normal mitral valve. Minimal mitral regurgitation.  Aortic Valve/Aorta: Calcified aortic valve. Peak  transaortic valve gradient equals 9 mm Hg. No aortic valve  regurgitation seen.  Aortic Root: 3.2 cm.  Left Atrium: Normal left atrium.  Left Ventricle: Normal left ventricular systolic function.  No segmental wall motion abnormalities. Normal left  ventricular internal dimensions and wall thicknesses.  Indeterminate diastolic function.  Right Heart: Normal right atrium. Normal right ventricular  size and function. Normal tricuspid valve. Minimal  tricuspid regurgitation. Normal pulmonic valve.  Pericardium/Pleura: Moderate pericardial effusion.  The  effusion measures 1.7cm anterior to the right ventricle as  seen from the subcostal view.  There is significant inflow  variation measured across the tricuspid valve.  Invagination of the right atrium is present along with  partial right ventricular compression.   Echocardiographic evidence of pericardial tamponade.  ------------------------------------------------------------------------  Conclusions:  1. Mitral annular calcification, otherwise normal mitral  valve. Minimal mitral regurgitation.  2. Calcified aortic valve. No aortic valve regurgitation  seen.  3. Normal left ventricular systolic function. No segmental  wall motion abnormalities.  4. Normal right ventricular size and function.  5. Moderate pericardial effusion.  The effusion measures  1.7cm anterior to the right ventricle as seen from the  subcostal view.  There is significant inflow variation  measured across the tricuspid valve.  Invagination of the  right atrium is present along with partial right  ventricular compression.   Echocardiographic evidence of pericardial tamponade.  *** No previous Echo exam.  Message left for Dr. Rodriguez and Dr. Villanueva.      < end of copied text >  < from: Transthoracic Echocardiogram (22 @ 06:54) >  Dimensions:    Normal Values:  LA:            2.0 - 4.0 cm  Ao:            2.0 - 3.8 cm  SEPTUM:        0.6 - 1.2 cm  PWT:           0.6 - 1.1 cm  LVIDd:         3.0 - 5.6 cm  LVIDs:         1.8 - 4.0 cm  EF (Visual Estimate): 70 %  ------------------------------------------------------------------------  Observations:  Mitral Valve: Normal mitral valve.  Aortic Valve/Aorta: Normal aortic valve.  Normal aortic root.  Left Atrium: Normal left atrium.  Left Ventricle: Normal left ventricular internal dimensions  and wall thicknesses.  Normal left ventricular systolic function. No segmental  wall motion abnormalities.  Right Heart: Normal right atrium. Normal right ventricular  size and function.  Normal tricuspid valve. Mild tricuspid regurgitation.  Pericardium/Pleura: Small circumferential pericardial  effusion.  Bilateral pleural effusions.  Hemodynamic: Estimated right atrialpressure is normal.  ------------------------------------------------------------------------  Conclusions:  Normal left ventricular systolic function. No segmental  wall motion abnormalities.  Small circumferential pericardial effusion.  *** Compared with echocardiogram of 3/14/2022, the effusion  appears slightly smaller.    < end of copied text >

## 2022-03-16 NOTE — CONSULT NOTE ADULT - PROBLEM SELECTOR RECOMMENDATION 7
Will continue to follow for symptom management. Case discussed with pulmonary and primary teams.    For acute issues or uncontrolled symptoms please page palliative team.    Yumiko Gottlieb MD  Geriatrics and Palliative Medicine Attending  [facility] pager: [phone]     The Geriatrics and Palliative Medicine consult service has 24/7 coverage for medical recommendations, including symptom management needs.

## 2022-03-16 NOTE — PROVIDER CONTACT NOTE (OTHER) - ASSESSMENT
Pt a&ox4, with an oral temp of 101.4, all other VSS. on 2 L NC
Pt a&ox4, all VSS. on 2 L NC, as per patient he only takes this medicine if he has hiccups

## 2022-03-16 NOTE — PROVIDER CONTACT NOTE (OTHER) - BACKGROUND
63 yo male admitted for cardiac tamponade. hx: stage 4 gastric adenocarcinoma with mets.
61 yo male admitted for cardiac tamponade. hx: stage 4 gastric adenocarcinoma with mets.

## 2022-03-16 NOTE — CONSULT NOTE ADULT - SUBJECTIVE AND OBJECTIVE BOX
HPI:  63yo M w/ PMHx of Stage IV gastric adenocarcinoma w/ mets to peritoneum, HTN, Hx of PE (s/p Lovenox & IVC filter) sent in by PMD after TTE was found to have a moderate pericardial effusion with echocardiographic evidence of tamponade. He is complaining constant chest pain with severe unrelenting cough, no lightheadedness or diaphoresis. Cough sometimes responds to Robitussin AC but not to tessalon perles. He takes Oxycodone for pain.    Discharged yesterday for cough/SOB, found to be anemic and COVID positive. Seen and evaluated by ID. s/p RDV and steroid, monitor off ABx. Elevated d-dimer, US LE neg for DVT. Likely elevated in setting of malignancy and COVID. Now down-trending. CTA neg for PE. Patient also seen by Neuro for Left food drop. CTH neg. MRI brain neg  MRI L spine with non specific enhancement L5/S1; f/u in 6 months recommended. L foot Dorsi flexion weakness of unclear etiology;  chemo can cause neuropathy not weakness. Seen by Ortho for AFO brace.  (15 Mar 2022 17:59)    PERTINENT PM/SXH:   Gastric adenocarcinoma    Hypertension      S/P IVC filter    Anorectal abscess      FAMILY HISTORY:  FHx: stroke    FH: colon cancer      Family Hx substance abuse [ ]yes [ ]no  ITEMS NOT CHECKED ARE NOT PRESENT    SOCIAL HISTORY:   Significant other/partner[ ]  Children[ ]  Taoist/Spirituality:  Substance hx:  [ ]   Tobacco hx:  [ ]   Alcohol hx: [ ]   Home Opioid hx:  [ ] I-Stop Reference No:  Living Situation: [ ]Home  [ ]Long term care  [ ]Rehab [ ]Other    ADVANCE DIRECTIVES:    DNR/MOLST  [ ]  Living Will  [ ]   DECISION MAKER(s):  [ ] Health Care Proxy(s)  [ ] Surrogate(s)  [ ] Guardian           Name(s): Phone Number(s):    BASELINE (I)ADL(s) (prior to admission):  [place]: [ ]Total  [ ] Moderate [ ]Dependent    Allergies    No Known Allergies    Intolerances    MEDICATIONS  (STANDING):  chlorproMAZINE    Tablet 10 milliGRAM(s) Oral three times a day  clonazePAM  Tablet 0.5 milliGRAM(s) Oral at bedtime  codeine 15 milliGRAM(s) Oral every 6 hours  enoxaparin Injectable 40 milliGRAM(s) SubCutaneous every 24 hours  hydrocodone/homatropine Syrup 5 milliLiter(s) Oral every 6 hours  losartan 100 milliGRAM(s) Oral daily  pantoprazole    Tablet 40 milliGRAM(s) Oral before breakfast  polyethylene glycol 3350 17 Gram(s) Oral daily  predniSONE   Tablet 30 milliGRAM(s) Oral two times a day    MEDICATIONS  (PRN):  acetaminophen     Tablet .. 650 milliGRAM(s) Oral every 6 hours PRN Temp greater or equal to 38C (100.4F), Mild Pain (1 - 3)  aluminum hydroxide/magnesium hydroxide/simethicone Suspension 30 milliLiter(s) Oral every 4 hours PRN Dyspepsia  cyclobenzaprine 10 milliGRAM(s) Oral three times a day PRN Muscle Spasm  guaiFENesin Oral Liquid (Sugar-Free) 200 milliGRAM(s) Oral every 6 hours PRN Cough  melatonin 3 milliGRAM(s) Oral at bedtime PRN Insomnia  morphine  - Injectable 4 milliGRAM(s) IV Push every 4 hours PRN Severe Pain (7 - 10)  ondansetron Injectable 4 milliGRAM(s) IV Push every 8 hours PRN Nausea and/or Vomiting  oxyCODONE    IR 5 milliGRAM(s) Oral every 6 hours PRN Moderate Pain (4 - 6)    PRESENT SYMPTOMS: [ ]Unable to self-report  [ ] CPOT [ ] PAINADs [ ] RDOS  Source if other than patient:  [ ]Family   [ ]Team     Pain: [ ]yes [ ]no  QOL impact -   Location -                    Aggravating factors -  Quality -  Radiation -  Timing-  Severity (0-10 scale):  Minimal acceptable level (0-10 scale):     CPOT:    https://www.sccm.org/getattachment/[email or web address]/Critical-Care-Pain-Observation-Tool-(CPOT)    PAIN AD Score:   http://geriatrictoolkit.[email or web address].[email or web address]/cog/painad.pdf (press ctrl +  left click to view)    Dyspnea:                           [ ]Mild [ ]Moderate [ ]Severe      RDOS:  0 to 2  minimal or no respiratory distress   3  mild distress  4 to 6 moderate distress  >7 severe distress  [email or web address]://[email or web address]/hen/Respiratory_Distress_Observation_Scale.pdf (Ctrl +  left click to view)     Anxiety:                             [ ]Mild [ ]Moderate [ ]Severe  Fatigue:                             [ ]Mild [ ]Moderate [ ]Severe  Nausea:                             [ ]Mild [ ]Moderate [ ]Severe  Loss of appetite:              [ ]Mild [ ]Moderate [ ]Severe  Constipation:                    [ ]Mild [ ]Moderate [ ]Severe    Other Symptoms:  [ ]All other review of systems negative     Palliative Performance Status Version 2:         %    [email or web address]://[email or web address]/[email or web address]_performance_scale_[email or web address].pdf  PHYSICAL EXAM:  Vital Signs Last 24 Hrs  T(C): 36.7 (16 Mar 2022 12:10), Max: 38.6 (15 Mar 2022 21:31)  T(F): 98.1 (16 Mar 2022 12:10), Max: 101.4 (15 Mar 2022 21:31)  HR: 103 (16 Mar 2022 12:10) (98 - 123)  BP: 104/67 (16 Mar 2022 12:10) (99/63 - 156/102)  BP(mean): 87 (15 Mar 2022 18:19) (87 - 118)  RR: 18 (16 Mar 2022 12:10) (15 - 25)  SpO2: 97% (16 Mar 2022 12:10) (95% - 100%) I&O's Summary    15 Mar 2022 07:01  -  16 Mar 2022 07:00  --------------------------------------------------------  IN: 400 mL / OUT: 450 mL / NET: -50 mL      GENERAL: [ ]Cachexia    [ ]Alert  [ ]Oriented x   [ ]Lethargic  [ ]Unarousable  [ ]Verbal  [ ]Non-Verbal  Behavioral:   [ ] Anxiety  [ ] Delirium [ ] Agitation [ ] Other  HEENT:  [ ]Normal   [ ]Dry mouth   [ ]ET Tube/Trach  [ ]Oral lesions  PULMONARY:   [ ]Clear [ ]Tachypnea  [ ]Audible excessive secretions   [ ]Rhonchi        [ ]Right [ ]Left [ ]Bilateral  [ ]Crackles        [ ]Right [ ]Left [ ]Bilateral  [ ]Wheezing     [ ]Right [ ]Left [ ]Bilateral  [ ]Diminished breath sounds [ ]right [ ]left [ ]bilateral  CARDIOVASCULAR:    [ ]Regular [ ]Irregular [ ]Tachy  [ ]Owen [ ]Murmur [ ]Other  GASTROINTESTINAL:  [ ]Soft  [ ]Distended   [ ]+BS  [ ]Non tender [ ]Tender  [ ]Other [ ]PEG [ ]OGT/ NGT  Last BM:  GENITOURINARY:  [ ]Normal [ ] Incontinent   [ ]Oliguria/Anuria   [ ]Daly  MUSCULOSKELETAL:   [ ]Normal   [ ]Weakness  [ ]Bed/Wheelchair bound [ ]Edema  NEUROLOGIC:   [ ]No focal deficits  [ ]Cognitive impairment  [ ]Dysphagia [ ]Dysarthria [ ]Paresis [ ]Other   SKIN:   [ ]Normal  [ ]Rash  [ ]Other  [ ]Pressure ulcer(s)       Present on admission [ ]y [ ]n    CRITICAL CARE:  [ ] Shock Present  [ ]Septic [ ]Cardiogenic [ ]Neurologic [ ]Hypovolemic  [ ]  Vasopressors [ ]  Inotropes   [ ]Respiratory failure present [ ]Mechanical ventilation [ ]Non-invasive ventilatory support [ ]High flow    [ ]Acute  [ ]Chronic [ ]Hypoxic  [ ]Hypercarbic [ ]Other  [ ]Other organ failure     LABS:                        9.7    13.48 )-----------( 309      ( 16 Mar 2022 06:50 )             31.4   03-16    135  |  98  |  20  ----------------------------<  119<H>  4.6   |  25  |  0.92    Ca    8.8      16 Mar 2022 06:47    TPro  6.4  /  Alb  3.5  /  TBili  0.8  /  DBili  x   /  AST  21  /  ALT  20  /  AlkPhos  85  03-15  PT/INR - ( 15 Mar 2022 16:56 )   PT: 14.9 sec;   INR: 1.28 ratio         PTT - ( 15 Mar 2022 16:56 )  PTT:28.3 sec    Urinalysis Basic - ( 16 Mar 2022 11:47 )    Color: Yellow / Appearance: Clear / S.023 / pH: x  Gluc: x / Ketone: Negative  / Bili: Negative / Urobili: Negative   Blood: x / Protein: Trace / Nitrite: Negative   Leuk Esterase: Negative / RBC: x / WBC x   Sq Epi: x / Non Sq Epi: x / Bacteria: x      RADIOLOGY & ADDITIONAL STUDIES:    PROTEIN CALORIE MALNUTRITION PRESENT: [ ]mild [ ]moderate [ ]severe [ ]underweight [ ]morbid obesity  https://www.andeal.org/vault/2440/web/files/ONC/Table_Clinical%20Characteristics%20to%20Document%20Malnutrition-White%20JV%20et%20al%2020.pdf    Height (cm): 165.1 (03-15-22 @ 15:42), 165.1 (03-10-22 @ 05:46)  Weight (kg): 63.7 (03-15-22 @ 21:31), 56.5 (03-10-22 @ :46)  BMI (kg/m2): 23.4 (03-15-22 @ 21:31), 20.7 (03-15-22 @ 15:42), 20.7 (03-10-22 @ :46)    [ ]PPSV2 < or = to 30% [ ]significant weight loss  [ ]poor nutritional intake  [ ]anasarca[ ]Artificial Nutrition      REFERRALS:   [ ]Chaplaincy  [ ]Hospice  [ ]Child Life  [ ]Social Work  [ ]Case management [ ]Holistic Therapy     Goals of Care Document:  HPI:  63yo M w/ PMHx of Stage IV gastric adenocarcinoma w/ mets to peritoneum, HTN, Hx of PE (s/p Lovenox & IVC filter) sent in by PMD after TTE was found to have a moderate pericardial effusion with echocardiographic evidence of tamponade. He is complaining constant chest pain with severe unrelenting cough, no lightheadedness or diaphoresis. Cough sometimes responds to Robitussin AC but not to tessalon perles. He takes Oxycodone for pain.    Discharged yesterday for cough/SOB, found to be anemic and COVID positive. Seen and evaluated by ID. s/p RDV and steroid, monitor off ABx. Elevated d-dimer, US LE neg for DVT. Likely elevated in setting of malignancy and COVID. Now down-trending. CTA neg for PE. Patient also seen by Neuro for Left food drop. CTH neg. MRI brain neg  MRI L spine with non specific enhancement L5/S1; f/u in 6 months recommended. L foot Dorsi flexion weakness of unclear etiology;  chemo can cause neuropathy not weakness. Seen by Ortho for AFO brace.  (15 Mar 2022 17:59)    PERTINENT PM/SXH:   Gastric adenocarcinoma    Hypertension    S/P IVC filter    Anorectal abscess    FAMILY HISTORY:  FHx: stroke    FH: colon cancer    ITEMS NOT CHECKED ARE NOT PRESENT    SOCIAL HISTORY:   Significant other/partner[x]  Children[x]  Restoration/Spirituality:  Substance hx:  [ ]   Tobacco hx:  [ ]   Alcohol hx: [ ]   Home Opioid hx:  [x] I-Stop Reference No: [id number]  Living Situation: [x]Home  [ ]Long term care  [ ]Rehab [ ]Other    ADVANCE DIRECTIVES:    DNR/MOLST  [ ]  Living Will  [ ]   DECISION MAKER(s):  [ ] Health Care Proxy(s)  [ ] Surrogate(s)  [ ] Guardian           Name(s): Phone Number(s):    BASELINE (I)ADL(s) (prior to admission):  [place]: [x]Total  [ ] Moderate [ ]Dependent    Allergies    No Known Allergies    Intolerances    MEDICATIONS  (STANDING):  chlorproMAZINE    Tablet 10 milliGRAM(s) Oral three times a day  clonazePAM  Tablet 0.5 milliGRAM(s) Oral at bedtime  codeine 15 milliGRAM(s) Oral every 6 hours  enoxaparin Injectable 40 milliGRAM(s) SubCutaneous every 24 hours  hydrocodone/homatropine Syrup 5 milliLiter(s) Oral every 6 hours  losartan 100 milliGRAM(s) Oral daily  pantoprazole    Tablet 40 milliGRAM(s) Oral before breakfast  polyethylene glycol 3350 17 Gram(s) Oral daily  predniSONE   Tablet 30 milliGRAM(s) Oral two times a day    MEDICATIONS  (PRN):  acetaminophen     Tablet .. 650 milliGRAM(s) Oral every 6 hours PRN Temp greater or equal to 38C (100.4F), Mild Pain (1 - 3)  aluminum hydroxide/magnesium hydroxide/simethicone Suspension 30 milliLiter(s) Oral every 4 hours PRN Dyspepsia  cyclobenzaprine 10 milliGRAM(s) Oral three times a day PRN Muscle Spasm  guaiFENesin Oral Liquid (Sugar-Free) 200 milliGRAM(s) Oral every 6 hours PRN Cough  melatonin 3 milliGRAM(s) Oral at bedtime PRN Insomnia  morphine  - Injectable 4 milliGRAM(s) IV Push every 4 hours PRN Severe Pain (7 - 10)  ondansetron Injectable 4 milliGRAM(s) IV Push every 8 hours PRN Nausea and/or Vomiting  oxyCODONE    IR 5 milliGRAM(s) Oral every 6 hours PRN Moderate Pain (4 - 6)    PRESENT SYMPTOMS: [ ]Unable to self-report  [ ] CPOT [ ] PAINADs [ ] RDOS  Source if other than patient:  [ ]Family   [ ]Team     Pain: [x]yes [ ]no  QOL impact - impairs sleep and    Location -                    Aggravating factors -  Quality -  Radiation -  Timing-  Severity (0-10 scale):  Minimal acceptable level (0-10 scale):     CPOT:    https://www.sccm.org/getattachment/[email or web address]/Critical-Care-Pain-Observation-Tool-(CPOT)    PAIN AD Score:   http://geriatrictoolkit.[place].[email or web address]/cog/painad.pdf (press ctrl +  left click to view)    Dyspnea:                           [ ]Mild [ ]Moderate [ ]Severe      RDOS:  0 to 2  minimal or no respiratory distress   3  mild distress  4 to 6 moderate distress  >7 severe distress  [email or web address]://[email or web address]/hen/Respiratory_Distress_Observation_Scale.pdf (Ctrl +  left click to view)     Anxiety:                             [ ]Mild [ ]Moderate [ ]Severe  Fatigue:                             [ ]Mild [ ]Moderate [ ]Severe  Nausea:                             [ ]Mild [ ]Moderate [ ]Severe  Loss of appetite:              [ ]Mild [ ]Moderate [ ]Severe  Constipation:                    [ ]Mild [ ]Moderate [ ]Severe    Other Symptoms:  [ ]All other review of systems negative     Palliative Performance Status Version 2:         %    http://[email or web address]/files/news/palliative_performance_scale_[email or web address].pdf  PHYSICAL EXAM:  Vital Signs Last 24 Hrs  T(C): 36.7 (16 Mar 2022 12:10), Max: 38.6 (15 Mar 2022 21:31)  T(F): 98.1 (16 Mar 2022 12:10), Max: 101.4 (15 Mar 2022 21:31)  HR: 103 (16 Mar 2022 12:10) (98 - 123)  BP: 104/67 (16 Mar 2022 12:10) (99/63 - 156/102)  BP(mean): 87 (15 Mar 2022 18:19) (87 - 118)  RR: 18 (16 Mar 2022 12:10) (15 - 25)  SpO2: 97% (16 Mar 2022 12:10) (95% - 100%) I&O's Summary    15 Mar 2022 07:01  -  16 Mar 2022 07:00  --------------------------------------------------------  IN: 400 mL / OUT: 450 mL / NET: -50 mL      GENERAL: [ ]Cachexia    [ ]Alert  [ ]Oriented x   [ ]Lethargic  [ ]Unarousable  [ ]Verbal  [ ]Non-Verbal  Behavioral:   [ ] Anxiety  [ ] Delirium [ ] Agitation [ ] Other  HEENT:  [ ]Normal   [ ]Dry mouth   [ ]ET Tube/Trach  [ ]Oral lesions  PULMONARY:   [ ]Clear [ ]Tachypnea  [ ]Audible excessive secretions   [ ]Rhonchi        [ ]Right [ ]Left [ ]Bilateral  [ ]Crackles        [ ]Right [ ]Left [ ]Bilateral  [ ]Wheezing     [ ]Right [ ]Left [ ]Bilateral  [ ]Diminished breath sounds [ ]right [ ]left [ ]bilateral  CARDIOVASCULAR:    [ ]Regular [ ]Irregular [ ]Tachy  [ ]Owen [ ]Murmur [ ]Other  GASTROINTESTINAL:  [ ]Soft  [ ]Distended   [ ]+BS  [ ]Non tender [ ]Tender  [ ]Other [ ]PEG [ ]OGT/ NGT  Last BM:  GENITOURINARY:  [ ]Normal [ ] Incontinent   [ ]Oliguria/Anuria   [ ]Daly  MUSCULOSKELETAL:   [ ]Normal   [ ]Weakness  [ ]Bed/Wheelchair bound [ ]Edema  NEUROLOGIC:   [ ]No focal deficits  [ ]Cognitive impairment  [ ]Dysphagia [ ]Dysarthria [ ]Paresis [ ]Other   SKIN:   [ ]Normal  [ ]Rash  [ ]Other  [ ]Pressure ulcer(s)       Present on admission [ ]y [ ]n    CRITICAL CARE:  [ ] Shock Present  [ ]Septic [ ]Cardiogenic [ ]Neurologic [ ]Hypovolemic  [ ]  Vasopressors [ ]  Inotropes   [ ]Respiratory failure present [ ]Mechanical ventilation [ ]Non-invasive ventilatory support [ ]High flow    [ ]Acute  [ ]Chronic [ ]Hypoxic  [ ]Hypercarbic [ ]Other  [ ]Other organ failure     LABS:                        9.7    13.48 )-----------( 309      ( 16 Mar 2022 06:50 )             31.4   03-16    135  |  98  |  20  ----------------------------<  119<H>  4.6   |  25  |  0.92    Ca    8.8      16 Mar 2022 06:47    TPro  6.4  /  Alb  3.5  /  TBili  0.8  /  DBili  x   /  AST  21  /  ALT  20  /  AlkPhos  85  03-15  PT/INR - ( 15 Mar 2022 16:56 )   PT: 14.9 sec;   INR: 1.28 ratio         PTT - ( 15 Mar 2022 16:56 )  PTT:28.3 sec    Urinalysis Basic - ( 16 Mar 2022 11:47 )    Color: Yellow / Appearance: Clear / S.023 / pH: x  Gluc: x / Ketone: Negative  / Bili: Negative / Urobili: Negative   Blood: x / Protein: Trace / Nitrite: Negative   Leuk Esterase: Negative / RBC: x / WBC x   Sq Epi: x / Non Sq Epi: x / Bacteria: x      RADIOLOGY & ADDITIONAL STUDIES:    PROTEIN CALORIE MALNUTRITION PRESENT: [ ]mild [ ]moderate [ ]severe [ ]underweight [ ]morbid obesity  https://www.andeal.org/vault/2440/web/files/ONC/Table_Clinical%20Characteristics%20to%20Document%20Malnutrition-White%20JV%20et%20al%2020.pdf    Height (cm): 165.1 (03-15-22 @ 15:42), 165.1 (03-10-22 @ 05:46)  Weight (kg): 63.7 (03-15-22 @ 21:31), 56.5 (03-10-22 @ :46)  BMI (kg/m2): 23.4 (03-15-22 @ 21:31), 20.7 (03-15-22 @ 15:42), 20.7 (03-10-22 @ :46)    [ ]PPSV2 < or = to 30% [ ]significant weight loss  [ ]poor nutritional intake  [ ]anasarca[ ]Artificial Nutrition      REFERRALS:   [ ]Chaplaincy  [ ]Hospice  [ ]Child Life  [ ]Social Work  [ ]Case management [ ]Holistic Therapy     Goals of Care Document:  HPI:  61yo M w/ PMHx of Stage IV gastric adenocarcinoma w/ mets to peritoneum, HTN, Hx of PE (s/p Lovenox & IVC filter) sent in by PMD after TTE was found to have a moderate pericardial effusion with echocardiographic evidence of tamponade. He is complaining constant chest pain with severe unrelenting cough, no lightheadedness or diaphoresis. Cough sometimes responds to Robitussin AC but not to tessalon perles. He takes Oxycodone for pain.    Discharged yesterday for cough/SOB, found to be anemic and COVID positive. Seen and evaluated by ID. s/p RDV and steroid, monitor off ABx. Elevated d-dimer, US LE neg for DVT. Likely elevated in setting of malignancy and COVID. Now down-trending. CTA neg for PE. Patient also seen by Neuro for Left food drop. CTH neg. MRI brain neg  MRI L spine with non specific enhancement L5/S1; f/u in 6 months recommended. L foot Dorsi flexion weakness of unclear etiology;  chemo can cause neuropathy not weakness. Seen by Ortho for AFO brace.  (15 Mar 2022 17:59)    PERTINENT PM/SXH:   Gastric adenocarcinoma    Hypertension    S/P IVC filter    Anorectal abscess    FAMILY HISTORY:  FHx: stroke    FH: colon cancer    ITEMS NOT CHECKED ARE NOT PRESENT    SOCIAL HISTORY:   Significant other/partner[x]  Children[x]  Taoist/Spirituality:  Substance hx:  [ ]   Tobacco hx:  [ ]   Alcohol hx: [ ]   Home Opioid hx:  [x] I-Stop Reference No: [id number]  Living Situation: [x]Home  [ ]Long term care  [ ]Rehab [ ]Other    ADVANCE DIRECTIVES:    DNR/MOLST  [ ]  Living Will  [ ]   DECISION MAKER(s):  [ ] Health Care Proxy(s)  [ ] Surrogate(s)  [ ] Guardian           Name(s): Phone Number(s):    BASELINE (I)ADL(s) (prior to admission):  [place]: [x]Total  [ ] Moderate [ ]Dependent    Allergies    No Known Allergies    Intolerances    MEDICATIONS  (STANDING):  chlorproMAZINE    Tablet 10 milliGRAM(s) Oral three times a day  clonazePAM  Tablet 0.5 milliGRAM(s) Oral at bedtime  codeine 15 milliGRAM(s) Oral every 6 hours  enoxaparin Injectable 40 milliGRAM(s) SubCutaneous every 24 hours  hydrocodone/homatropine Syrup 5 milliLiter(s) Oral every 6 hours  losartan 100 milliGRAM(s) Oral daily  pantoprazole    Tablet 40 milliGRAM(s) Oral before breakfast  polyethylene glycol 3350 17 Gram(s) Oral daily  predniSONE   Tablet 30 milliGRAM(s) Oral two times a day    MEDICATIONS  (PRN):  acetaminophen     Tablet .. 650 milliGRAM(s) Oral every 6 hours PRN Temp greater or equal to 38C (100.4F), Mild Pain (1 - 3)  aluminum hydroxide/magnesium hydroxide/simethicone Suspension 30 milliLiter(s) Oral every 4 hours PRN Dyspepsia  cyclobenzaprine 10 milliGRAM(s) Oral three times a day PRN Muscle Spasm  guaiFENesin Oral Liquid (Sugar-Free) 200 milliGRAM(s) Oral every 6 hours PRN Cough  melatonin 3 milliGRAM(s) Oral at bedtime PRN Insomnia  morphine  - Injectable 4 milliGRAM(s) IV Push every 4 hours PRN Severe Pain (7 - 10)  ondansetron Injectable 4 milliGRAM(s) IV Push every 8 hours PRN Nausea and/or Vomiting  oxyCODONE    IR 5 milliGRAM(s) Oral every 6 hours PRN Moderate Pain (4 - 6)    PRESENT SYMPTOMS: [ ]Unable to self-report  [ ] CPOT [ ] PAINADs [ ] RDOS  Source if other than patient:  [ ]Family   [ ]Team     Pain: [x]yes [ ]no  QOL impact - yes, impairs sleep   Location - chest                   Aggravating factors - cough  Quality - sharp/stabbing  Radiation - none  Timing- continuous with coughing  Severity (0-10 scale): 10   Minimal acceptable level (0-10 scale): 2-3     CPOT:    https://www.sccm.org/getattachment/kgc73w64-4m7m-[email or web address]-5032n7058o9q/Critical-Care-Pain-Observation-Tool-(CPOT)    PAIN AD Score:   http://geriatrictoolkit.[email or web address]/cog/painad.pdf (press ctrl +  left click to view)    Dyspnea:                           [x]Mild [ ]Moderate [ ]Severe      RDOS:  0 to 2  minimal or no respiratory distress   3  mild distress  4 to 6 moderate distress  >7 severe distress  https://[email or web address]/[email or web address]/hen/Respiratory_Distress_Observation_Scale.pdf (Ctrl +  left click to view)     Anxiety:                             [ ]Mild [ ]Moderate [ ]Severe  Fatigue:                             [ ]Mild [ ]Moderate [ ]Severe  Nausea:                             [ ]Mild [ ]Moderate [ ]Severe  Loss of appetite:              [ ]Mild [ ]Moderate [ ]Severe  Constipation:                    [ ]Mild [ ]Moderate [ ]Severe    Other Symptoms:  [x]All other review of systems negative     Palliative Performance Status Version 2:     70    %    http://npcrc.org/files/news/palliative_performance_scale_ppsv2.pdf  PHYSICAL EXAM:  Vital Signs Last 24 Hrs  T(C): 36.7 (16 Mar 2022 12:10), Max: 38.6 (15 Mar 2022 21:31)  T(F): 98.1 (16 Mar 2022 12:10), Max: 101.4 (15 Mar 2022 21:31)  HR: 103 (16 Mar 2022 12:10) (98 - 123)  BP: 104/67 (16 Mar 2022 12:10) (99/63 - 156/102)  BP(mean): 87 (15 Mar 2022 18:19) (87 - 118)  RR: 18 (16 Mar 2022 12:10) (15 - 25)  SpO2: 97% (16 Mar 2022 12:10) (95% - 100%) I&O's Summary    15 Mar 2022 07:01  -  16 Mar 2022 07:00  --------------------------------------------------------  IN: 400 mL / OUT: 450 mL / NET: -50 mL      GENERAL:  [x]Alert  [x]Oriented x 3  [ ]Lethargic  [ ]Cachexia  [ ]Unarousable  [x]Verbal  [ ]Non-Verbal  Behavioral:   [ ]Anxiety  [ ]Delirium [ ]Agitation [ ]Other  HEENT:  [x]Normal   [ ]Dry mouth   [ ]ET Tube/Trach  [ ]Oral lesions  PULMONARY:   [ ]Clear [ ]Tachypnea  [ ]Audible excessive secretions   [ ]Rhonchi        [ ]Right [ ]Left [ ]Bilateral  [ ]Crackles        [ ]Right [ ]Left [ ]Bilateral  [ ]Wheezing     [ ]Right [ ]Left [ ]Bilateral  [ ]Diminished BS [ ] Right [ ]Left [ ]Bilateral  [x] coarse BS b/l  CARDIOVASCULAR:    [x]Regular [ ]Irregular [ ]Tachy  [ ]Owen [ ]Murmur [ ]Other  GASTROINTESTINAL:  [x]Soft  [ ]Distended   [x]+BS  [x]Non tender [ ]Tender  [ ]PEG [ ]OGT/ NGT   Last BM:  3/15  GENITOURINARY:  [x]Normal [ ]Incontinent   [ ]Oliguria/Anuria   [ ]Daly  MUSCULOSKELETAL:   [ ]Normal   [x]Weakness  [ ]Bed/Wheelchair bound [ ]Edema  NEUROLOGIC:   [x]No focal deficits  [ ] Cognitive impairment  [ ] Dysphagia [ ]Dysarthria [ ] Paresis [ ]Other   SKIN:   [x]Normal  [ ]Rash   [ ]Pressure ulcer(s) [ ]y [ ]n present on admission  CRITICAL CARE:  [ ] Shock Present  [ ]Septic [ ]Cardiogenic [ ]Neurologic [ ]Hypovolemic  [ ]  Vasopressors [ ]  Inotropes   [ ]Respiratory failure present [ ]Mechanical ventilation [ ]Non-invasive ventilatory support [ ]High flow    [ ]Acute  [ ]Chronic [ ]Hypoxic  [ ]Hypercarbic [ ]Other  [ ]Other organ failure     LABS:                        9.7    13.48 )-----------( 309      ( 16 Mar 2022 06:50 )             31.4   03-16    135  |  98  |  20  ----------------------------<  119<H>  4.6   |  25  |  0.92    Ca    8.8      16 Mar 2022 06:47    TPro  6.4  /  Alb  3.5  /  TBili  0.8  /  DBili  x   /  AST  21  /  ALT  20  /  AlkPhos  85  03-15  PT/INR - ( 15 Mar 2022 16:56 )   PT: 14.9 sec;   INR: 1.28 ratio         PTT - ( 15 Mar 2022 16:56 )  PTT:28.3 sec    Urinalysis Basic - ( 16 Mar 2022 11:47 )    Color: Yellow / Appearance: Clear / S.023 / pH: x  Gluc: x / Ketone: Negative  / Bili: Negative / Urobili: Negative   Blood: x / Protein: Trace / Nitrite: Negative   Leuk Esterase: Negative / RBC: x / WBC x   Sq Epi: x / Non Sq Epi: x / Bacteria: x      RADIOLOGY & ADDITIONAL STUDIES:    < from: Xray Chest 1 View- PORTABLE-Urgent (03.15.22 @ 17:06) >    ACC: [id number] EXAM:  XR CHEST PORTABLE URGENT 1V                          PROCEDURE DATE:  03/15/2022          INTERPRETATION:  EXAMINATION: XR CHEST URGENT    CLINICAL INDICATION: Chest Pain    TECHNIQUE: Single frontal, portable view of the chestwas obtained.    COMPARISON: Chest radiograph 3/7/2022.    FINDINGS:  Mediport tip in the SVC.  The heart size is normal. The cherry are prominent bilaterally.  There are bilateral interstitial opacities, most prominent in the   perihilar regions and lower lung fields.  Bilateral trace pleural effusions.    IMPRESSION:  Unchanged interstitial opacities and bilateral pleural effusions. This   may be related to pulmonary edema, pneumonia or lymphangitic spread.    --- End of Report ---          WILLIAM OLIVEROS MD; Resident Radiology  This document has been electronically signed.  OLGA PINA MD; Attending Radiologist  This document has been electronically signed. Mar 16 2022  7:11AM    < end of copied text >      PROTEIN CALORIE MALNUTRITION PRESENT: [ ]mild [ ]moderate [ ]severe [ ]underweight [ ]morbid obesity  https://www.andeal.org/vault/2440/web/files/ONC/Table_Clinical%20Characteristics%20to%20Document%20Malnutrition-White%20JV%20et%20al%2020.pdf    Height (cm): 165.1 (03-15-22 @ 15:42), 165.1 (03-10-22 @ 05:46)  Weight (kg): 63.7 (03-15-22 @ 21:31), 56.5 (03-10-22 @ 05:46)  BMI (kg/m2): 23.4 (03-15-22 @ 21:31), 20.7 (03-15-22 @ 15:42), 20.7 (03-10-22 @ 05:46)    [ ]PPSV2 < or = to 30% [ ]significant weight loss  [ ]poor nutritional intake  [ ]anasarca[ ]Artificial Nutrition      REFERRALS:   [ ]Chaplaincy  [ ]Hospice  [ ]Child Life  [ ]Social Work  [ ]Case management [ ]Holistic Therapy     Goals of Care Document:

## 2022-03-16 NOTE — PROGRESS NOTE ADULT - ASSESSMENT
61yo M PMHx of Stage IV gastric adenocarcinoma w/ mets to peritoneum, HTN, Hx of PE (s/p Lovenox & IVC filter) recent admission for COVID and progression of disease, sent in by PMD after TTE was found to have a moderate pericardial effusion with echocardiographic evidence of tamponade.    # pericardial effusion  cannot ro pericarditis, already on prednisone 30mg  cardio consult  TTE Normal left ventricular systolic function. No segmental wall motion abnormalities. Small circumferential pericardial effusion. Compared with echocardiogram of 3/14/2022, the effusion appears slightly smaller.  tele  Morphine 2mg q4h PRN severe pain  Continue oxycodone 5mg PRN  continue hydrocodone syrup    # fever  check UA urine cx blood cultures  leukocytosis likely 2/2 steroids  check PCT    # Hypertension  holding Losartan    # Gastric cancer Stage 4 with mets  heme onc consult  progression of disease in most recent imaging  pain control    #  2019 novel coronavirus disease (COVID-19)  completed remdesivir  on Steroids - continue Prednisone 30, reactive leukocytosis noted    # L foot Dorsi flexion weakness of unclear etiology  neurology evaluated last admission   MRI brain neg  MRI L spine with non specific enhancement L5/S1   neuro recommended AFO brace for left foot ,  repeat MRI LS spine in 6 months     DVT ppx: Lovenox    PCP Dr. Fernandes 63yo M PMHx of Stage IV gastric adenocarcinoma w/ mets to peritoneum, HTN, Hx of PE (s/p Lovenox & IVC filter) recent admission for COVID and progression of disease, sent in by PMD after TTE was found to have a moderate pericardial effusion with echocardiographic evidence of tamponade.    # pericardial effusion  # fever  cannot ro pericarditis, however, already on prednisone 30mg  cardio consult  TTE shows improved small effusion   tele  Morphine 2mg q4h PRN severe pain, oxycodone 5mg PRN  continue hydrocodone syrup  check UA urine cx blood cultures  leukocytosis likely 2/2 steroids  check PCT  pulm consult    # Gastric cancer Stage 4 with mets  heme onc consult  progression of disease in most recent imaging  pain poorly controlled, palliative care consult for pain control, if not pain management consult    # Hypertension  holding Losartan    # 2019 novel coronavirus disease (COVID-19)  completed remdesivir  on Steroids - continue Prednisone 30, reactive leukocytosis noted    # L foot Dorsi flexion weakness of unclear etiology  neurology evaluated last admission   MRI brain neg  MRI L spine with non specific enhancement L5/S1   neuro recommended AFO brace for left foot ,  repeat MRI LS spine in 6 months     DVT ppx: Lovenox    PCP Dr. Fernandes    Please call [facility] with questions [phone].

## 2022-03-16 NOTE — CONSULT NOTE ADULT - PROBLEM SELECTOR RECOMMENDATION 5
Hx stage IV gastric adenocarcinoma w/ mets to peritoneum   -Management per heme/onc.
mangement per onc/pulm

## 2022-03-16 NOTE — PATIENT PROFILE ADULT - FALL HARM RISK - HARM RISK INTERVENTIONS

## 2022-03-16 NOTE — CONSULT NOTE ADULT - ASSESSMENT
63yo M w/ PMHx of Stage IV gastric adenocarcinoma w/ mets to peritoneum, HTN, Hx of PE (s/p Lovenox & IVC filter) sent in by PMD after TTE was found to have a moderate pericardial effusion with echocardiographic evidence of tamponade. He is complaining constant chest pain with severe unrelenting cough, no lightheadedness or diaphoresis. Cough sometimes responds to Robitussin AC but not to tessalon perles. He takes Oxycodone for pain.    Discharged yesterday for cough/SOB, found to be anemic and COVID positive. Seen and evaluated by ID. s/p RDV and steroid, monitor off ABx. Elevated d-dimer, US LE neg for DVT. Likely elevated in setting of malignancy and COVID. Now down-trending. CTA neg for PE. Patient also seen by Neuro for Left food drop. CTH neg. MRI brain neg  MRI L spine with non specific enhancement L5/S1; f/u in 6 months recommended. L foot Dorsi flexion weakness of unclear etiology;  chemo can cause neuropathy not weakness. Seen by Ortho for AFO brace.  (15 Mar 2022 17:59)    Patient is a 62 year old male under care at [facility] Dr. Brad Rojas with stage IV gastric adenocarcinoma (pMMR, HER-2 0+, CPS <1) with mets to the peritoneum. First cycle of chemotherapy was FOLFOX/Nivolumab - patient initially had response but was admitted to [facility] for SOB, cough and found to have progression of disease. CT chest/abdomen/pelvis 3/2/2022 showed significant progression of diease and lymphangitic spread.     Patient received one dose of paclitaxel 80 mg/m2 on 3/4/2022. RT consult was placed but he has not yet seen Dr. Forrest of [facility].    Metastatic Gastric Cancer  --Under care by Dr. Brad Rojas of [facility]  --Lymphangitic spread noted on most recent imaging   --Ongoing care after discharge    Pericardial Effusion  --Concern for Tamponade  --Will need CT surgery consultation  --Management per CT surgery, Cardiology, Primary team    COVID-19  --Recently received Remdesivir  --On Prednisone 30mg PO BID  --Continuing to spike fever (early this morning)  --PCR still positive  --On O2 2L via NC    Anemia  --Secondary to chronic disease/chemotherapy  --Please transfuse PRBC's for Hgb <7.0 grams    Leukocytosis  --Reactive  --Steroids, infection, malignancy  --No intervention indicated at this time    We will continue to follow patient and coordinate with [facility].    Alejandro Ureña PA-C  Hematology/Oncology  [facility]   [phone] (office)  [phone] (alt office)  Evenings and weekends please call MD on call or office   63yo M w/ PMHx of Stage IV gastric adenocarcinoma w/ mets to peritoneum, HTN, Hx of PE (s/p Lovenox & IVC filter) sent in by PMD after TTE was found to have a moderate pericardial effusion with echocardiographic evidence of tamponade. He is complaining constant chest pain with severe unrelenting cough, no lightheadedness or diaphoresis. Cough sometimes responds to Robitussin AC but not to tessalon perles. He takes Oxycodone for pain.    Discharged yesterday for cough/SOB, found to be anemic and COVID positive. Seen and evaluated by ID. s/p RDV and steroid, monitor off ABx. Elevated d-dimer, US LE neg for DVT. Likely elevated in setting of malignancy and COVID. Now down-trending. CTA neg for PE. Patient also seen by Neuro for Left food drop. CTH neg. MRI brain neg  MRI L spine with non specific enhancement L5/S1; f/u in 6 months recommended. L foot Dorsi flexion weakness of unclear etiology;  chemo can cause neuropathy not weakness. Seen by Ortho for AFO brace.  (15 Mar 2022 17:59)    Patient is a 62 year old male under care at [facility] Dr. Brad Rojas with stage IV gastric adenocarcinoma (pMMR, HER-2 0+, CPS <1) with mets to the peritoneum. First cycle of chemotherapy was FOLFOX/Nivolumab - patient initially had response but was admitted to [facility] for SOB, cough and found to have progression of disease. CT chest/abdomen/pelvis 3/2/2022 showed significant progression of diease and lymphangitic spread.     Patient received one dose of paclitaxel 80 mg/m2 on 3/4/2022. RT consult was placed but he has not yet seen Dr. Forrest of [facility].    Metastatic Gastric Cancer  --Under care by Dr. Brad Rojas of [facility] (who has been notified of current findings)  --Lymphangitic spread noted on most recent imaging   --Ongoing care after discharge    Pericardial Effusion  --Concern for Tamponade  --Repeat Echo this am - effusion appears slightly smaller  --Will need CT surgery consultation  --Management per CT surgery (if needed), Cardiology, Primary team    COVID-19  --Recently received Remdesivir  --On Prednisone 30mg PO BID  --Continuing to spike fever (early this morning)  --PCR still positive  --On O2 2L via NC  --Ongoing management per ID, pulmonary    Anemia  --Secondary to chronic disease/chemotherapy  --Please transfuse PRBC's for Hgb <7.0 grams    Leukocytosis  --Reactive  --Steroids, infection, malignancy  --No intervention indicated at this time    We will continue to follow patient and coordinate with [facility].    Alejandro Ureña PA-C  Hematology/Oncology  [facility]   [phone] (office)  [phone] (alt office)  Evenings and weekends please call MD on call or office

## 2022-03-16 NOTE — CONSULT NOTE ADULT - PROBLEM SELECTOR RECOMMENDATION 9
multifactorial etiology ?COVID vs lymphangitic lung mets vs pericardial effusion  Pulm consult noted  agree with hycodan q6h ATC in addition to codeine q6h ATC   steroids per pulm

## 2022-03-16 NOTE — CONSULT NOTE ADULT - PROBLEM SELECTOR RECOMMENDATION 4
+COVID PCR 3/7, 3/15  -Fully vaccinated  -S/p Remdesivir x5 days on prior admission  -Normoxic  -DVT ppx  -Trend inflammatory markers   -Pt with IVC filter in place  -CTA chest 3/7 neg PE, LE duplex 3/10 neg DVT.
s/p remdesivir  steroids per pulm

## 2022-03-16 NOTE — PROVIDER CONTACT NOTE (OTHER) - ACTION/TREATMENT ORDERED:
Jose CHENG notified and ordered iv tylenol and blood cultures. Tylenol given. Pt refusing blood cultures. Will continue to monitor

## 2022-03-16 NOTE — CONSULT NOTE ADULT - SUBJECTIVE AND OBJECTIVE BOX
Reason for consult: Gastric Cancer    HPI:  63yo M w/ PMHx of Stage IV gastric adenocarcinoma w/ mets to peritoneum, HTN, Hx of PE (s/p Lovenox & IVC filter) sent in by PMD after TTE was found to have a moderate pericardial effusion with echocardiographic evidence of tamponade. He is complaining constant chest pain with severe unrelenting cough, no lightheadedness or diaphoresis. Cough sometimes responds to Robitussin AC but not to tessalon perles. He takes Oxycodone for pain.    Discharged yesterday for cough/SOB, found to be anemic and COVID positive. Seen and evaluated by ID. s/p RDV and steroid, monitor off ABx. Elevated d-dimer, US LE neg for DVT. Likely elevated in setting of malignancy and COVID. Now down-trending. CTA neg for PE. Patient also seen by Neuro for Left food drop. CTH neg. MRI brain neg  MRI L spine with non specific enhancement L5/S1; f/u in 6 months recommended. L foot Dorsi flexion weakness of unclear etiology;  chemo can cause neuropathy not weakness. Seen by Ortho for AFO brace.  (15 Mar 2022 17:59)    Patient is a 62 year old male [place] [facility] [place] Rojas with stage IV gastric adenocarcinoma (pMMR, HER-2 0+, CPS <1) with mets to the peritoneum. First cycle of chemotherapy was FOLFOX/Nivolumab - patient initially had response but was admitted to [facility] for SOB, cough and found to have progression of disease. CT chest/abdomen/pelvis 3/2/2022 showed significant progression of diease and lymphangitic spread.     Patient received one dose of paclitaxel 80 mg/m2 on 3/4/2022. RT consult was placed but he has not yet seen Dr. Forrest of [facility]    PAST MEDICAL & SURGICAL HISTORY:  Gastric adenocarcinoma  stage 4    Hypertension    S/P IVC filter    Anorectal abscess        FAMILY HISTORY:  FHx: stroke    FH: colon cancer        Alcohol Denied  Smoking: Nonsmoker  Drug Use: Denied  Marital Status:         Allergies    No Known Allergies    Intolerances        MEDICATIONS  (STANDING):  chlorproMAZINE    Tablet 10 milliGRAM(s) Oral three times a day  clonazePAM  Tablet 0.5 milliGRAM(s) Oral at bedtime  enoxaparin Injectable 40 milliGRAM(s) SubCutaneous every 24 hours  losartan 100 milliGRAM(s) Oral daily  pantoprazole    Tablet 40 milliGRAM(s) Oral before breakfast  polyethylene glycol 3350 17 Gram(s) Oral daily  predniSONE   Tablet 30 milliGRAM(s) Oral two times a day    MEDICATIONS  (PRN):  acetaminophen     Tablet .. 650 milliGRAM(s) Oral every 6 hours PRN Temp greater or equal to 38C (100.4F), Mild Pain (1 - 3)  aluminum hydroxide/magnesium hydroxide/simethicone Suspension 30 milliLiter(s) Oral every 4 hours PRN Dyspepsia  cyclobenzaprine 10 milliGRAM(s) Oral three times a day PRN Muscle Spasm  guaiFENesin Oral Liquid (Sugar-Free) 200 milliGRAM(s) Oral every 6 hours PRN Cough  hydrocodone/homatropine Syrup 5 milliLiter(s) Oral every 6 hours PRN Cough  melatonin 3 milliGRAM(s) Oral at bedtime PRN Insomnia  morphine  - Injectable 2 milliGRAM(s) IV Push every 4 hours PRN Severe Pain (7 - 10)  ondansetron Injectable 4 milliGRAM(s) IV Push every 8 hours PRN Nausea and/or Vomiting  oxyCODONE    IR 5 milliGRAM(s) Oral every 6 hours PRN Moderate Pain (4 - 6)      ROS  No fever, sweats, chills  No epistaxis, HA, sore throat  Cough, exertional dyspnea  No n/v/d, abd pain, melena, hematochezia  No edema  No rash  No anxiety  No back pain, joint pain  No bleeding, bruising  No dysuria, hematuria    T(C): 36.6 (03-16-22 @ 06:01), Max: 38.6 (03-15-22 @ 21:31)  HR: 104 (03-16-22 @ 06:01) (98 - 123)  BP: 113/75 (03-16-22 @ 06:01) (99/63 - 156/102)  RR: 20 (03-16-22 @ 06:01) (15 - 25)  SpO2: 100% (03-16-22 @ 06:01) (95% - 100%)  Wt(kg): --    PE  NAD  Awake, alert  Anicteric, MMM  RRR  CTAB  Abd soft, NT, ND  No c/c/e  No rash grossly                            9.7    15.71 )-----------( 367      ( 15 Mar 2022 16:19 )             31.9       03-15    138  |  99  |  22  ----------------------------<  105<H>  5.2   |  25  |  1.11    Ca    9.0      15 Mar 2022 16:19    TPro  6.4  /  Alb  3.5  /  TBili  0.8  /  DBili  x   /  AST  21  /  ALT  20  /  AlkPhos  85  03-15    Patient name: CECY LEE  YOB: 1960   Age: 62 (M)   MR#: [id number]  Study Date: 3/14/2022  [id number]: Robbie Cartwright [facility]  Study quality: Technically good  Referring Physician: Ari Villanueva MD  Blood Pressure: 99/63 mmHg  Height: 165 cm  Weight: 66 kg  BSA: 1.7 m2  ------------------------------------------------------------------------  PROCEDURE: Transthoracic echocardiogram with 2-D, M-Mode  and complete spectral and color flow Doppler.  INDICATION: Heart failure, unspecified (I50.9)  ------------------------------------------------------------------------  Dimensions:    Normal Values:  LA:     3.2    2.0 - 4.0 cm  Ao:     3.2    2.0 - 3.8 cm  SEPTUM: 0.9    0.6 - 1.2 cm  PWT:    0.9    0.6 - 1.1 cm  LVIDd:  4.1    3.0 - 5.6 cm  LVIDs:  2.2    1.8 - 4.0 cm  Derived variables:  LVMI: 66 g/m2  RWT: 0.43  Fractional short: 46 %  EF (Visual Estimate): 70-75 %  Doppler Peak Velocity (m/sec): AoV=1.5  ------------------------------------------------------------------------  Observations:  Mitral Valve: Mitral annular calcification, otherwise  normal mitral valve. Minimal mitral regurgitation.  Aortic Valve/Aorta: Calcified aortic valve. Peak  transaortic valve gradient equals 9 mm Hg. No aortic valve  regurgitation seen.  Aortic Root: 3.2 cm.  Left Atrium: Normal left atrium.  Left Ventricle: Normal left ventricular systolic function.  No segmental wall motion abnormalities. Normal left  ventricular internal dimensions and wall thicknesses.  Indeterminate diastolic function.  Right Heart: Normal right atrium. Normal right ventricular  size and function. Normal tricuspid valve. Minimal  tricuspid regurgitation. Normal pulmonic valve.  Pericardium/Pleura: Moderate pericardial effusion.  The  effusion measures 1.7cm anterior to the right ventricle as  seen from the subcostal view.  There is significant inflow  variation measured across the tricuspid valve.  Invagination of the right atrium is present along with  partial right ventricular compression.   Echocardiographic evidence of pericardial tamponade.  ------------------------------------------------------------------------  Conclusions:  1. Mitral annular calcification, otherwise normal mitral  valve. Minimal mitral regurgitation.  2. Calcified aortic valve. No aortic valve regurgitation  seen.  3. Normal left ventricular systolic function. No segmental  wall motion abnormalities.  4. Normal right ventricular size and function.  5. Moderate pericardial effusion.  The effusion measures  1.7cm anterior to the right ventricle as seen from the  subcostal view.  There is significant inflow variation  measured across the tricuspid valve.  Invagination of the  right atrium is present along with partial right  ventricular compression.   Echocardiographic evidence of pericardial tamponade.  *** No previous Echo exam.  Message left for Dr. Rodriguez and Dr. Villanueva.  ------------------------------------------------------------------------  Confirmed on  3/14/2022 - 14:15:08 by Armand Gonzales M.D.      ******PRELIMINARY REPORT******      ******PRELIMINARY REPORT******       ACC: [id number] EXAM:  XR CHEST PORTABLE URGENT 1V                          PROCEDURE DATE:  03/15/2022    ******PRELIMINARY REPORT******      ******PRELIMINARY REPORT******           INTERPRETATION:  EXAMINATION: XR CHEST URGENT    CLINICAL INDICATION: Chest Pain    TECHNIQUE: Single frontal, portable view of the chest was obtained.    COMPARISON: Chest radiograph 3/7/2022.    FINDINGS:  Mediport tip in the SVC.  The heart size is normal.  There are bilateral interstitial opacities, most prominent in the   perihilar regions and lower lung fields.  Bilateral trace pleural effusions.    IMPRESSION:  Unchanged interstitial opacities and bilateral pleural effusions.     Reason for consult: Gastric Cancer    HPI:  63yo M w/ PMHx of Stage IV gastric adenocarcinoma w/ mets to peritoneum, HTN, Hx of PE (s/p Lovenox & IVC filter) sent in by PMD after TTE was found to have a moderate pericardial effusion with echocardiographic evidence of tamponade. He is complaining constant chest pain with severe unrelenting cough, no lightheadedness or diaphoresis. Cough sometimes responds to Robitussin AC but not to tessalon perles. He takes Oxycodone for pain.    Discharged yesterday for cough/SOB, found to be anemic and COVID positive. Seen and evaluated by ID. s/p RDV and steroid, monitor off ABx. Elevated d-dimer, US LE neg for DVT. Likely elevated in setting of malignancy and COVID. Now down-trending. CTA neg for PE. Patient also seen by Neuro for Left food drop. CTH neg. MRI brain neg  MRI L spine with non specific enhancement L5/S1; f/u in 6 months recommended. L foot Dorsi flexion weakness of unclear etiology;  chemo can cause neuropathy not weakness. Seen by Ortho for AFO brace.  (15 Mar 2022 17:59)    Patient is a 62 year old male under care at [facility] Dr. Brda Rojas with stage IV gastric adenocarcinoma (pMMR, HER-2 0+, CPS <1) with mets to the peritoneum. First cycle of chemotherapy was FOLFOX/Nivolumab - patient initially had response but was admitted to [facility] for SOB, cough and found to have progression of disease. CT chest/abdomen/pelvis 3/2/2022 showed significant progression of diease and lymphangitic spread.     Patient received one dose of paclitaxel 80 mg/m2 on 3/4/2022. RT consult was placed but he has not yet seen Dr. Forrest of [facility].    PAST MEDICAL & SURGICAL HISTORY:  Gastric adenocarcinoma  stage 4    Hypertension    S/P IVC filter    Anorectal abscess        FAMILY HISTORY:  FHx: stroke    FH: colon cancer        Alcohol Denied  Smoking: Nonsmoker  Drug Use: Denied  Marital Status:         Allergies    No Known Allergies    Intolerances        MEDICATIONS  (STANDING):  chlorproMAZINE    Tablet 10 milliGRAM(s) Oral three times a day  clonazePAM  Tablet 0.5 milliGRAM(s) Oral at bedtime  enoxaparin Injectable 40 milliGRAM(s) SubCutaneous every 24 hours  losartan 100 milliGRAM(s) Oral daily  pantoprazole    Tablet 40 milliGRAM(s) Oral before breakfast  polyethylene glycol 3350 17 Gram(s) Oral daily  predniSONE   Tablet 30 milliGRAM(s) Oral two times a day    MEDICATIONS  (PRN):  acetaminophen     Tablet .. 650 milliGRAM(s) Oral every 6 hours PRN Temp greater or equal to 38C (100.4F), Mild Pain (1 - 3)  aluminum hydroxide/magnesium hydroxide/simethicone Suspension 30 milliLiter(s) Oral every 4 hours PRN Dyspepsia  cyclobenzaprine 10 milliGRAM(s) Oral three times a day PRN Muscle Spasm  guaiFENesin Oral Liquid (Sugar-Free) 200 milliGRAM(s) Oral every 6 hours PRN Cough  hydrocodone/homatropine Syrup 5 milliLiter(s) Oral every 6 hours PRN Cough  melatonin 3 milliGRAM(s) Oral at bedtime PRN Insomnia  morphine  - Injectable 2 milliGRAM(s) IV Push every 4 hours PRN Severe Pain (7 - 10)  ondansetron Injectable 4 milliGRAM(s) IV Push every 8 hours PRN Nausea and/or Vomiting  oxyCODONE    IR 5 milliGRAM(s) Oral every 6 hours PRN Moderate Pain (4 - 6)      ROS  Fever spike to 101.4 overnight  No epistaxis, HA, sore throat  Cough, exertional dyspnea, ongoing  No n/v/d, abd pain, melena, hematochezia  No edema  No rash  No anxiety  No back pain, joint pain  No bleeding, bruising  No dysuria, hematuria    T(C): 36.6 (03-16-22 @ 06:01), Max: 38.6 (03-15-22 @ 21:31)  HR: 104 (03-16-22 @ 06:01) (98 - 123)  BP: 113/75 (03-16-22 @ 06:01) (99/63 - 156/102)  RR: 20 (03-16-22 @ 06:01) (15 - 25)  SpO2: 100% (03-16-22 @ 06:01) (95% - 100%)  Wt(kg): --    PE  NAD  Awake, alert  Anicteric, MMM  RRR  Scattered rhonci on left  Abd soft, NT, ND  No c/c/e  No rash grossly                            9.7    15.71 )-----------( 367      ( 15 Mar 2022 16:19 )             31.9       03-15    138  |  99  |  22  ----------------------------<  105<H>  5.2   |  25  |  1.11    Ca    9.0      15 Mar 2022 16:19    TPro  6.4  /  Alb  3.5  /  TBili  0.8  /  DBili  x   /  AST  21  /  ALT  20  /  AlkPhos  85  03-15    Patient name: CECY LEE  YOB: 1960   Age: 62 (M)   MR#: [id number]  Study Date: 3/14/2022  Location: [place] [id number]: Robbie Cartwright RDCS  Study quality: Technically good  Referring Physician: Ari Villanueva MD  Blood Pressure: 99/63 mmHg  Height: 165 cm  Weight: 66 kg  BSA: 1.7 m2  ------------------------------------------------------------------------  PROCEDURE: Transthoracic echocardiogram with 2-D, M-Mode  and complete spectral and color flow Doppler.  INDICATION: Heart failure, unspecified (I50.9)  ------------------------------------------------------------------------  Dimensions:    Normal Values:  LA:     3.2    2.0 - 4.0 cm  Ao:     3.2    2.0 - 3.8 cm  SEPTUM: 0.9    0.6 - 1.2 cm  PWT:    0.9    0.6 - 1.1 cm  LVIDd:  4.1    3.0 - 5.6 cm  LVIDs:  2.2    1.8 - 4.0 cm  Derived variables:  LVMI: 66 g/m2  RWT: 0.43  Fractional short: 46 %  EF (Visual Estimate): 70-75 %  Doppler Peak Velocity (m/sec): AoV=1.5  ------------------------------------------------------------------------  Observations:  Mitral Valve: Mitral annular calcification, otherwise  normal mitral valve. Minimal mitral regurgitation.  Aortic Valve/Aorta: Calcified aortic valve. Peak  transaortic valve gradient equals 9 mm Hg. No aortic valve  regurgitation seen.  Aortic Root: 3.2 cm.  Left Atrium: Normal left atrium.  Left Ventricle: Normal left ventricular systolic function.  No segmental wall motion abnormalities. Normal left  ventricular internal dimensions and wall thicknesses.  Indeterminate diastolic function.  Right Heart: Normal right atrium. Normal right ventricular  size and function. Normal tricuspid valve. Minimal  tricuspid regurgitation. Normal pulmonic valve.  Pericardium/Pleura: Moderate pericardial effusion.  The  effusion measures 1.7cm anterior to the right ventricle as  seen from the subcostal view.  There is significant inflow  variation measured across the tricuspid valve.  Invagination of the right atrium is present along with  partial right ventricular compression.   Echocardiographic evidence of pericardial tamponade.  ------------------------------------------------------------------------  Conclusions:  1. Mitral annular calcification, otherwise normal mitral  valve. Minimal mitral regurgitation.  2. Calcified aortic valve. No aortic valve regurgitation  seen.  3. Normal left ventricular systolic function. No segmental  wall motion abnormalities.  4. Normal right ventricular size and function.  5. Moderate pericardial effusion.  The effusion measures  1.7cm anterior to the right ventricle as seen from the  subcostal view.  There is significant inflow variation  measured across the tricuspid valve.  Invagination of the  right atrium is present along with partial right  ventricular compression.   Echocardiographic evidence of pericardial tamponade.  *** No previous Echo exam.  Message left for Dr. Rodriguez and Dr. Villanueva.  ------------------------------------------------------------------------  Confirmed on  3/14/2022 - 14:15:08 by Armand Gonzales M.D.      ******PRELIMINARY REPORT******      ******PRELIMINARY REPORT******       ACC: [id number] EXAM:  XR CHEST PORTABLE URGENT 1V                          PROCEDURE DATE:  03/15/2022    ******PRELIMINARY REPORT******      ******PRELIMINARY REPORT******           INTERPRETATION:  EXAMINATION: XR CHEST URGENT    CLINICAL INDICATION: Chest Pain    TECHNIQUE: Single frontal, portable view of the chest was obtained.    COMPARISON: Chest radiograph 3/7/2022.    FINDINGS:  Mediport tip in the SVC.  The heart size is normal.  There are bilateral interstitial opacities, most prominent in the   perihilar regions and lower lung fields.  Bilateral trace pleural effusions.    IMPRESSION:  Unchanged interstitial opacities and bilateral pleural effusions.      Patient name: CECY LEE  YOB: 1960   Age: 62 (M)   MR#: [id number]  Study Date: 3/16/2022  Location: [place] [address]  DSonographer: TEQUILA De La Vega  Study quality: Technically good  Referring Physician: Ari Villanueva MD  Blood Pressure: 113/75 mmHg  Height: 165 cm  Weight: 56 kg  BSA: 1.6 m2  ------------------------------------------------------------------------  PROCEDURE: Transthoracic echocardiogram with 2-D, M-Mode  and complete spectral and color flow Doppler.  INDICATION: Cardiac tamponade (I31.4)  ------------------------------------------------------------------------  Dimensions:    Normal Values:  LA:            2.0 - 4.0 cm  Ao:            2.0 - 3.8 cm  SEPTUM:        0.6 - 1.2 cm  PWT:           0.6 - 1.1 cm  LVIDd:         3.0 - 5.6 cm  LVIDs:         1.8 - 4.0 cm  EF (Visual Estimate): 70 %  ------------------------------------------------------------------------  Observations:  Mitral Valve: Normal mitral valve.  Aortic Valve/Aorta: Normal aortic valve.  Normal aortic root.  Left Atrium: Normal left atrium.  Left Ventricle: Normal left ventricular internal dimensions  and wall thicknesses.  Normal left ventricular systolic function. No segmental  wall motion abnormalities.  Right Heart: Normal right atrium. Normal right ventricular  size and function.  Normal tricuspid valve. Mild tricuspid regurgitation.  Pericardium/Pleura: Small circumferential pericardial  effusion.  Bilateral pleural effusions.  Hemodynamic: Estimated right atrial pressure is normal.  ------------------------------------------------------------------------  Conclusions:  Normal left ventricular systolic function. No segmental  wall motion abnormalities.  Small circumferential pericardial effusion.  *** Compared with echocardiogram of 3/14/2022, the effusion  appears slightly smaller.  ---------------------------

## 2022-03-16 NOTE — CONSULT NOTE ADULT - SUBJECTIVE AND OBJECTIVE BOX
CHIEF COMPLAINT: Patient is a 62y old  Male who presents with a chief complaint of pericardial effusion (16 Mar 2022 06:35)      HPI:  63yo M w/ PMHx of Stage IV gastric adenocarcinoma w/ mets to peritoneum, HTN, Hx of PE (s/p Lovenox & IVC filter) sent in by PMD after TTE was found to have a moderate pericardial effusion with echocardiographic evidence of tamponade. He is complaining constant chest pain with severe unrelenting cough, no lightheadedness or diaphoresis. Cough sometimes responds to Robitussin AC but not to tessalon perles. He takes Oxycodone for pain.    Discharged 3/14 for cough/SOB, found to be anemic and COVID positive. Seen and evaluated by ID. s/p RDV and steroid, monitor off ABx. Elevated d-dimer, US LE neg for DVT. Likely elevated in setting of malignancy and COVID. Now down-trending. CTA neg for PE. Patient also seen by Neuro for Left food drop. CTH neg. MRI brain neg  MRI L spine with non specific enhancement L5/S1; f/u in 6 months recommended. L foot Dorsi flexion weakness of unclear etiology;  chemo can cause neuropathy not weakness. Seen by Ortho for AFO brace.  (15 Mar 2022 17:59)    Interval hx: still with chest pain with coughing.  cough worsened with laying down. + orthopnea, Denies any   palpitations, PND, near syncope, syncope, lower extremity edema, stroke like symptoms.     EKG: 3/7 ST    REVIEW OF SYSTEMS:   All other review of systems are negative unless indicated above    PAST MEDICAL & SURGICAL HISTORY:  Gastric adenocarcinoma  stage 4    Hypertension    S/P IVC filter    Anorectal abscess        SOCIAL HISTORY:  No tobacco, ethanol, or drug abuse.    FAMILY HISTORY:  FHx: stroke    FH: colon cancer      No family history of acute MI or sudden cardiac death.    MEDICATIONS  (STANDING):  chlorproMAZINE    Tablet 10 milliGRAM(s) Oral three times a day  clonazePAM  Tablet 0.5 milliGRAM(s) Oral at bedtime  enoxaparin Injectable 40 milliGRAM(s) SubCutaneous every 24 hours  losartan 100 milliGRAM(s) Oral daily  pantoprazole    Tablet 40 milliGRAM(s) Oral before breakfast  polyethylene glycol 3350 17 Gram(s) Oral daily  predniSONE   Tablet 30 milliGRAM(s) Oral two times a day    MEDICATIONS  (PRN):  acetaminophen     Tablet .. 650 milliGRAM(s) Oral every 6 hours PRN Temp greater or equal to 38C (100.4F), Mild Pain (1 - 3)  aluminum hydroxide/magnesium hydroxide/simethicone Suspension 30 milliLiter(s) Oral every 4 hours PRN Dyspepsia  cyclobenzaprine 10 milliGRAM(s) Oral three times a day PRN Muscle Spasm  guaiFENesin Oral Liquid (Sugar-Free) 200 milliGRAM(s) Oral every 6 hours PRN Cough  hydrocodone/homatropine Syrup 5 milliLiter(s) Oral every 6 hours PRN Cough  melatonin 3 milliGRAM(s) Oral at bedtime PRN Insomnia  morphine  - Injectable 2 milliGRAM(s) IV Push every 4 hours PRN Severe Pain (7 - 10)  ondansetron Injectable 4 milliGRAM(s) IV Push every 8 hours PRN Nausea and/or Vomiting  oxyCODONE    IR 5 milliGRAM(s) Oral every 6 hours PRN Moderate Pain (4 - 6)      Allergies    No Known Allergies    Intolerances        Home meds:  Home Medications:  benzonatate 100 mg oral capsule: 2 cap(s) orally 3 times a day, As needed, Cough (14 Mar 2022 12:56)  chlorproMAZINE 10 mg oral tablet: 1 tab(s) orally 3 times a day (07 Mar 2022 21:50)  clonazePAM 0.5 mg oral tablet: 1 tab(s) orally once a day (at bedtime) (07 Mar 2022 21:50)  cyclobenzaprine 10 mg oral tablet:  (07 Mar 2022 21:50)  oxyCODONE 5 mg oral tablet: 1 tab(s) orally every 6 hours (07 Mar 2022 21:50)  pantoprazole 40 mg oral delayed release tablet: 1 tab(s) orally once a day (07 Mar 2022 21:50)  prochlorperazine 10 mg oral tablet:  (07 Mar 2022 21:50)  telmisartan 80 mg oral tablet: 1 tab(s) orally once a day (07 Mar 2022 21:50)        VITAL SIGNS:   Vital Signs Last 24 Hrs  T(C): 36.6 (16 Mar 2022 06:01), Max: 38.6 (15 Mar 2022 21:31)  T(F): 97.9 (16 Mar 2022 06:01), Max: 101.4 (15 Mar 2022 21:31)  HR: 104 (16 Mar 2022 06:01) (98 - 123)  BP: 113/75 (16 Mar 2022 06:01) (99/63 - 156/102)  BP(mean): 87 (15 Mar 2022 18:19) (87 - 118)  RR: 20 (16 Mar 2022 06:01) (15 - 25)  SpO2: 100% (16 Mar 2022 06:01) (95% - 100%)    I&O's Summary    15 Mar 2022 07:01  -  16 Mar 2022 07:00  --------------------------------------------------------  IN: 400 mL / OUT: 450 mL / NET: -50 mL        On Exam:  TELE:   Constitutional: uncomfortable, awake  HEENT: Moist Mucous Membranes, Anicteric  Pulmonary: Decreased breath sounds b/l. No rales, crackles or wheeze appreciated.   Cardiovascular: Regular, S1 and S2, No murmurs, rubs, gallops or clicks  Gastrointestinal: Bowel Sounds present, soft, nontender.   Lymph: No peripheral edema. No lymphadenopathy.  Skin: No visible rashes or ulcers.  Psych:  Mood & affect appropriate    LABS: All Labs Reviewed:                        9.7    13.48 )-----------( 309      ( 16 Mar 2022 06:50 )             31.4                         9.7    15.71 )-----------( 367      ( 15 Mar 2022 16:19 )             31.9     16 Mar 2022 06:47    135    |  98     |  20     ----------------------------<  119    4.6     |  25     |  0.92   15 Mar 2022 16:19    138    |  99     |  22     ----------------------------<  105    5.2     |  25     |  1.11     Ca    8.8        16 Mar 2022 06:47  Ca    9.0        15 Mar 2022 16:19    TPro  6.4    /  Alb  3.5    /  TBili  0.8    /  DBili  x      /  AST  21     /  ALT  20     /  AlkPhos  85     15 Mar 2022 16:19    PT/INR - ( 15 Mar 2022 16:56 )   PT: 14.9 sec;   INR: 1.28 ratio         PTT - ( 15 Mar 2022 16:56 )  PTT:28.3 sec      Blood Culture:         RADIOLOGY:  < from: Xray Chest 1 View- PORTABLE-Urgent (03.15.22 @ 17:06) >    ACC: [id number] EXAM:  XR CHEST PORTABLE URGENT 1V                          PROCEDURE DATE:  03/15/2022          INTERPRETATION:  EXAMINATION: XR CHEST URGENT    CLINICAL INDICATION: Chest Pain    TECHNIQUE: Single frontal, portable view of the chestwas obtained.    COMPARISON: Chest radiograph 3/7/2022.    FINDINGS:  Mediport tip in the SVC.  The heart size is normal. The cherry are prominent bilaterally.  There are bilateral interstitial opacities, most prominent in the   perihilar regions and lower lung fields.  Bilateral trace pleural effusions.    IMPRESSION:  Unchanged interstitial opacities and bilateral pleural effusions. This   may be related to pulmonary edema, pneumonia or lymphangitic spread.    --- End of Report ---          WILLIAM OLIVEROS MD; Resident Radiology  This document has been electronically signed.  OLGA PINA MD; Attending Radiologist  This document has been electronically signed. Mar 16 2022  7:11AM    < end of copied text >         < from: Transthoracic Echocardiogram (03.14.22 @ 12:17) >    Patient name: CECY LEE  YOB: 1960   Age: 62 (M)   MR#: [id number]  Study Date: 3/14/2022  [id number]  Study quality: Technically good  Referring Physician: Ari Villanueva MD  BloodPressure: 99/63 mmHg  Height: 165 cm  Weight: 66 kg  BSA: 1.7 m2  ------------------------------------------------------------------------  PROCEDURE: Transthoracic echocardiogram with 2-D, M-Mode  and complete spectral and color flow Doppler.  INDICATION: Heart failure, unspecified (I50.9)  ------------------------------------------------------------------------  Dimensions:    Normal Values:  LA:     3.2    2.0 - 4.0 cm  Ao:     3.2    2.0 - 3.8 cm  SEPTUM: 0.9    0.6 - 1.2 cm  PWT:    0.9    0.6 - 1.1 cm  LVIDd:  4.1    3.0 - 5.6 cm  LVIDs:  2.2    1.8 - 4.0 cm  Derived variables:  LVMI: 66 g/m2  RWT: 0.43  Fractional short: 46 %  EF (Visual Estimate): 70-75 %  Doppler Peak Velocity (m/sec): AoV=1.5  ------------------------------------------------------------------------  Observations:  Mitral Valve: Mitral annular calcification, otherwise  normal mitral valve. Minimal mitral regurgitation.  Aortic Valve/Aorta: Calcified aortic valve. Peak  transaortic valve gradient equals 9 mm Hg. No aortic valve  regurgitation seen.  Aortic Root: 3.2 cm.  Left Atrium: Normal left atrium.  Left Ventricle: Normal left ventricular systolic function.  No segmental wall motion abnormalities. Normal left  ventricular internal dimensions and wall thicknesses.  Indeterminate diastolic function.  Right Heart: Normal right atrium. Normal right ventricular  size and function. Normal tricuspid valve. Minimal  tricuspid regurgitation. Normal pulmonic valve.  Pericardium/Pleura: Moderate pericardial effusion.  The  effusion measures 1.7cm anterior to the right ventricle as  seen from the subcostal view.  There is significant inflow  variation measured across the tricuspid valve.  Invagination of the right atrium is present along with  partial right ventricular compression.   Echocardiographic evidence of pericardial tamponade.  ------------------------------------------------------------------------  Conclusions:  1. Mitral annular calcification, otherwise normal mitral  valve. Minimal mitral regurgitation.  2. Calcified aortic valve. No aortic valve regurgitation  seen.  3. Normal left ventricular systolic function. No segmental  wall motion abnormalities.  4. Normal right ventricular size and function.  5. Moderate pericardial effusion.  The effusion measures  1.7cm anterior to the right ventricle as seen from the  subcostal view.  There is significant inflow variation  measured across the tricuspid valve.  Invagination of the  right atrium is present along with partial right  ventricular compression.   Echocardiographic evidence of pericardial tamponade.  *** No previous Echo exam.  Message left for Dr. Rodriguez and Dr. Villanueva.  ------------------------------------------------------------------------  Confirmed on  3/14/2022 - 14:15:08by Armand Gonzales M.D.  ------------------------------------------------------------------------    < end of copied text >

## 2022-03-17 ENCOUNTER — TRANSCRIPTION ENCOUNTER (OUTPATIENT)
Age: 62
End: 2022-03-17

## 2022-03-17 VITALS
RESPIRATION RATE: 18 BRPM | TEMPERATURE: 98 F | SYSTOLIC BLOOD PRESSURE: 101 MMHG | DIASTOLIC BLOOD PRESSURE: 70 MMHG | OXYGEN SATURATION: 96 % | HEART RATE: 100 BPM

## 2022-03-17 LAB
ALBUMIN SERPL ELPH-MCNC: 3.3 G/DL — SIGNIFICANT CHANGE UP (ref 3.3–5)
ALP SERPL-CCNC: 76 U/L — SIGNIFICANT CHANGE UP (ref 40–120)
ALT FLD-CCNC: 11 U/L — SIGNIFICANT CHANGE UP (ref 10–45)
ANION GAP SERPL CALC-SCNC: 12 MMOL/L — SIGNIFICANT CHANGE UP (ref 5–17)
AST SERPL-CCNC: 15 U/L — SIGNIFICANT CHANGE UP (ref 10–40)
BILIRUB SERPL-MCNC: 1 MG/DL — SIGNIFICANT CHANGE UP (ref 0.2–1.2)
BUN SERPL-MCNC: 20 MG/DL — SIGNIFICANT CHANGE UP (ref 7–23)
CALCIUM SERPL-MCNC: 8.7 MG/DL — SIGNIFICANT CHANGE UP (ref 8.4–10.5)
CHLORIDE SERPL-SCNC: 95 MMOL/L — LOW (ref 96–108)
CO2 SERPL-SCNC: 26 MMOL/L — SIGNIFICANT CHANGE UP (ref 22–31)
CREAT SERPL-MCNC: 0.98 MG/DL — SIGNIFICANT CHANGE UP (ref 0.5–1.3)
D DIMER BLD IA.RAPID-MCNC: 648 NG/ML DDU — HIGH
EGFR: 88 ML/MIN/1.73M2 — SIGNIFICANT CHANGE UP
GLUCOSE SERPL-MCNC: 145 MG/DL — HIGH (ref 70–99)
HCT VFR BLD CALC: 30 % — LOW (ref 39–50)
HGB BLD-MCNC: 9.2 G/DL — LOW (ref 13–17)
MCHC RBC-ENTMCNC: 24.9 PG — LOW (ref 27–34)
MCHC RBC-ENTMCNC: 30.7 GM/DL — LOW (ref 32–36)
MCV RBC AUTO: 81.1 FL — SIGNIFICANT CHANGE UP (ref 80–100)
NRBC # BLD: 0 /100 WBCS — SIGNIFICANT CHANGE UP (ref 0–0)
PLATELET # BLD AUTO: 283 K/UL — SIGNIFICANT CHANGE UP (ref 150–400)
POTASSIUM SERPL-MCNC: 4.2 MMOL/L — SIGNIFICANT CHANGE UP (ref 3.5–5.3)
POTASSIUM SERPL-SCNC: 4.2 MMOL/L — SIGNIFICANT CHANGE UP (ref 3.5–5.3)
PROT SERPL-MCNC: 6.2 G/DL — SIGNIFICANT CHANGE UP (ref 6–8.3)
RBC # BLD: 3.7 M/UL — LOW (ref 4.2–5.8)
RBC # FLD: 29.7 % — HIGH (ref 10.3–14.5)
SODIUM SERPL-SCNC: 133 MMOL/L — LOW (ref 135–145)
WBC # BLD: 12.27 K/UL — HIGH (ref 3.8–10.5)
WBC # FLD AUTO: 12.27 K/UL — HIGH (ref 3.8–10.5)

## 2022-03-17 PROCEDURE — 84295 ASSAY OF SERUM SODIUM: CPT

## 2022-03-17 PROCEDURE — 99255 IP/OBS CONSLTJ NEW/EST HI 80: CPT

## 2022-03-17 PROCEDURE — 81003 URINALYSIS AUTO W/O SCOPE: CPT

## 2022-03-17 PROCEDURE — 93306 TTE W/DOPPLER COMPLETE: CPT

## 2022-03-17 PROCEDURE — 82435 ASSAY OF BLOOD CHLORIDE: CPT

## 2022-03-17 PROCEDURE — 93005 ELECTROCARDIOGRAM TRACING: CPT

## 2022-03-17 PROCEDURE — 80053 COMPREHEN METABOLIC PANEL: CPT

## 2022-03-17 PROCEDURE — 99285 EMERGENCY DEPT VISIT HI MDM: CPT | Mod: 25

## 2022-03-17 PROCEDURE — 85018 HEMOGLOBIN: CPT

## 2022-03-17 PROCEDURE — 87040 BLOOD CULTURE FOR BACTERIA: CPT

## 2022-03-17 PROCEDURE — 36415 COLL VENOUS BLD VENIPUNCTURE: CPT

## 2022-03-17 PROCEDURE — 85610 PROTHROMBIN TIME: CPT

## 2022-03-17 PROCEDURE — 99232 SBSQ HOSP IP/OBS MODERATE 35: CPT

## 2022-03-17 PROCEDURE — 84145 PROCALCITONIN (PCT): CPT

## 2022-03-17 PROCEDURE — 85025 COMPLETE CBC W/AUTO DIFF WBC: CPT

## 2022-03-17 PROCEDURE — 84484 ASSAY OF TROPONIN QUANT: CPT

## 2022-03-17 PROCEDURE — U0005: CPT

## 2022-03-17 PROCEDURE — 82330 ASSAY OF CALCIUM: CPT

## 2022-03-17 PROCEDURE — 82803 BLOOD GASES ANY COMBINATION: CPT

## 2022-03-17 PROCEDURE — 84132 ASSAY OF SERUM POTASSIUM: CPT

## 2022-03-17 PROCEDURE — 80048 BASIC METABOLIC PNL TOTAL CA: CPT

## 2022-03-17 PROCEDURE — 85379 FIBRIN DEGRADATION QUANT: CPT

## 2022-03-17 PROCEDURE — 36000 PLACE NEEDLE IN VEIN: CPT

## 2022-03-17 PROCEDURE — 85014 HEMATOCRIT: CPT

## 2022-03-17 PROCEDURE — 85027 COMPLETE CBC AUTOMATED: CPT

## 2022-03-17 PROCEDURE — 83605 ASSAY OF LACTIC ACID: CPT

## 2022-03-17 PROCEDURE — 71045 X-RAY EXAM CHEST 1 VIEW: CPT

## 2022-03-17 PROCEDURE — 85730 THROMBOPLASTIN TIME PARTIAL: CPT

## 2022-03-17 PROCEDURE — U0003: CPT

## 2022-03-17 PROCEDURE — 82947 ASSAY GLUCOSE BLOOD QUANT: CPT

## 2022-03-17 RX ORDER — PROCHLORPERAZINE MALEATE 5 MG
0 TABLET ORAL
Qty: 0 | Refills: 0 | DISCHARGE

## 2022-03-17 RX ORDER — CYCLOBENZAPRINE HYDROCHLORIDE 10 MG/1
1 TABLET, FILM COATED ORAL
Qty: 0 | Refills: 0 | DISCHARGE
Start: 2022-03-17

## 2022-03-17 RX ORDER — CYCLOBENZAPRINE HYDROCHLORIDE 10 MG/1
0 TABLET, FILM COATED ORAL
Qty: 0 | Refills: 0 | DISCHARGE

## 2022-03-17 RX ORDER — OXYCODONE HYDROCHLORIDE 5 MG/1
1 TABLET ORAL
Qty: 0 | Refills: 0 | DISCHARGE

## 2022-03-17 RX ORDER — OXYCODONE HYDROCHLORIDE 5 MG/1
1 TABLET ORAL
Qty: 30 | Refills: 0
Start: 2022-03-17 | End: 2022-03-21

## 2022-03-17 RX ADMIN — MORPHINE SULFATE 4 MILLIGRAM(S): 50 CAPSULE, EXTENDED RELEASE ORAL at 13:45

## 2022-03-17 RX ADMIN — CODEINE SULFATE 15 MILLIGRAM(S): 60 TABLET ORAL at 12:00

## 2022-03-17 RX ADMIN — PANTOPRAZOLE SODIUM 40 MILLIGRAM(S): 20 TABLET, DELAYED RELEASE ORAL at 06:07

## 2022-03-17 RX ADMIN — CODEINE SULFATE 15 MILLIGRAM(S): 60 TABLET ORAL at 11:27

## 2022-03-17 RX ADMIN — LOSARTAN POTASSIUM 100 MILLIGRAM(S): 100 TABLET, FILM COATED ORAL at 06:04

## 2022-03-17 RX ADMIN — CODEINE SULFATE 15 MILLIGRAM(S): 60 TABLET ORAL at 06:04

## 2022-03-17 RX ADMIN — CODEINE SULFATE 15 MILLIGRAM(S): 60 TABLET ORAL at 18:02

## 2022-03-17 RX ADMIN — Medication 30 MILLIGRAM(S): at 18:02

## 2022-03-17 RX ADMIN — MORPHINE SULFATE 4 MILLIGRAM(S): 50 CAPSULE, EXTENDED RELEASE ORAL at 13:24

## 2022-03-17 RX ADMIN — Medication 30 MILLIGRAM(S): at 06:04

## 2022-03-17 NOTE — PROGRESS NOTE ADULT - ASSESSMENT
61yo M w/ PMHx of Stage IV gastric adenocarcinoma w/ mets to peritoneum, HTN, Hx of PE (s/p Lovenox & IVC filter) sent in by PMD after TTE was found to have a moderate pericardial effusion with echocardiographic evidence of tamponade.    - 3/14 echo with moderate pericardial effusion with echocardiographic evidence of tamponade  -repeat echo 3/16 with a slightly smaller effusion and no signs of elevated pericardial pressure noted, and so no indication for pericardiocentesis or window  -is on prednisone which should be more than adequate for treatment of any pericardial inflammation  - defer NSAID and colchicine   -explained that there does not seem to be urgency to repeat his echo now but that would repeat it as an outpt in my office next week, and have given him my contact info  -his chest discomfort is related to coughing and seems unrelated to the pericardial process  -not enough pleural fluid to make thoracentesis worthwhile  - fu heme/onc and pulm  - BP is stable  - can hold losartan to allow for some permissive htn  - avoid preload reduction which might make the pericardial effusion more hemodynamically significant      - Monitor and replete electrolytes. Keep K>4.0 and Mg>2.0.   - Further cardiac workup will depend on clinical course.

## 2022-03-17 NOTE — DISCHARGE NOTE NURSING/CASE MANAGEMENT/SOCIAL WORK - NSDCPEPTCAREGIVEDUMATLIST _GEN_ALL_CORE
Influenza Vaccination/Coronavirus/COVID19

## 2022-03-17 NOTE — PROGRESS NOTE ADULT - ASSESSMENT
63yo M PMHx of Stage IV gastric adenocarcinoma w/ mets to peritoneum, HTN, Hx of PE (s/p Lovenox & IVC filter) recent admission for COVID and progression of disease, sent in by PMD after TTE was found to have a moderate pericardial effusion with echocardiographic evidence of tamponade.    # pericardial effusion  # fever  cannot ro pericarditis, however, already on prednisone 30mg  cardio consult  TTE shows improved small effusion   tele  Morphine 2mg q4h PRN severe pain, oxycodone 5mg PRN  continue hydrocodone syrup  BCx/UCx NGTD  leukocytosis likely 2/2 steroids  check PCT  pulm consult    # Gastric cancer Stage 4 with mets  heme onc consult  progression of disease in most recent imaging  pain poorly controlled, palliative care consult for pain control, if not pain management consult    # Hypertension  holding Losartan    # 2019 novel coronavirus disease (COVID-19)  completed remdesivir  on Steroids - continue Prednisone 30, reactive leukocytosis noted    # L foot Dorsi flexion weakness of unclear etiology  neurology evaluated last admission   MRI brain neg  MRI L spine with non specific enhancement L5/S1   neuro recommended AFO brace for left foot ,  repeat MRI LS spine in 6 months     DVT ppx: Lovenox    PCP Dr. Fernandes    Please call [employer] with questions [phone]. 63yo M PMHx of Stage IV gastric adenocarcinoma w/ mets to peritoneum, HTN, Hx of PE (s/p Lovenox & IVC filter) recent admission for COVID and progression of disease, sent in by PMD after TTE was found to have a moderate pericardial effusion with echocardiographic evidence of tamponade.    # pericardial effusion  already on steroids to cover pericarditis  cardio following  TTE shows improved small effusion     # Gastric cancer Stage 4 with mets  onc following  progression of disease in most recent imaging  pain poorly controlled, appreciate palliative care recs, will fu re pain regimen  has chemo outpt tomorrow    # Hypertension  holding Losartan for borderline BP    # 2019 novel coronavirus disease (COVID-19)  completed remdesivir prior admission  continue Prednisone 30 BID per pulm  BCx/UCx NGTD  outpt fu Dr. Christensen    # L foot Dorsi flexion weakness of unclear etiology  neurology evaluated last admission   MRI brain neg  MRI L spine with non specific enhancement L5/S1   neuro recommended AFO brace for left foot,  repeat MRI LS spine in 6 months     DVT ppx: Lovenox    PCP Dr. Fernandes    dispo: dc planning pending pain control    Please call [employer] with questions [phone]. 61yo M PMHx of Stage IV gastric adenocarcinoma w/ mets to peritoneum, HTN, Hx of PE (s/p Lovenox & IVC filter) recent admission for COVID and progression of disease, sent in by PMD after TTE was found to have a moderate pericardial effusion with echocardiographic evidence of tamponade.    # pericardial effusion  already on steroids to cover pericarditis  cardio following  TTE shows improved small effusion     # Gastric cancer Stage 4 with mets  onc following  progression of disease in most recent imaging  pain poorly controlled, appreciate palliative care recs, will fu re pain regimen  has chemo outpt tomorrow  bactrim ppx    # Hypertension  holding Losartan for borderline BP    # 2019 novel coronavirus disease (COVID-19)  completed remdesivir prior admission  continue Prednisone 30 BID per pulm  BCx/UCx NGTD  outpt fu Dr. Christensen  d dimer downtrending    # L foot Dorsi flexion weakness of unclear etiology  neurology evaluated last admission   MRI brain neg  MRI L spine with non specific enhancement L5/S1   neuro recommended AFO brace for left foot,  repeat MRI LS spine in 6 months     DVT ppx: Lovenox    PCP Dr. Fernandes    dispo: dc planning pending pain control    Please call [employer] with questions [phone].

## 2022-03-17 NOTE — PROGRESS NOTE ADULT - PROBLEM SELECTOR PLAN 3
CTA chest 3/7 with conglomerate lymphadenopathy in the mediastinal along with thickening of the bronchovascular interstitium suggestive of lymphangitic spread  -Pt normoxic but with persistent cough, SOB. Cough improved with Hycodan, codeine, steroids on prior admission  -C/w Prednisone 30 mg PO BID  -C/w Bactrim for PCP ppx while on steroids   -Continue Hycodan 5cc q6h (hold for sedation), Codeine 15mg PO q6h (hold for sedation)  -Continue Protonix 40mg PO qd while on steroids.  -D/c planning from pulm perspective. Pt already has f/u appt in office with Dr. Christensen 3/21/22.
management per cardiology

## 2022-03-17 NOTE — PROGRESS NOTE ADULT - ASSESSMENT
63yo M w/ PMHx of Stage IV gastric adenocarcinoma w/ mets to peritoneum, HTN, Hx of PE (s/p Lovenox & IVC filter) sent in by PMD after TTE was found to have a moderate pericardial effusion with echocardiographic evidence of tamponade. He is complaining constant chest pain with severe unrelenting cough, no lightheadedness or diaphoresis. Cough sometimes responds to Robitussin AC but not to tessalon perles. He takes Oxycodone for pain.    Discharged yesterday for cough/SOB, found to be anemic and COVID positive. Seen and evaluated by ID. s/p RDV and steroid, monitor off ABx. Elevated d-dimer, US LE neg for DVT. Likely elevated in setting of malignancy and COVID. Now down-trending. CTA neg for PE. Patient also seen by Neuro for Left food drop. CTH neg. MRI brain neg  MRI L spine with non specific enhancement L5/S1; f/u in 6 months recommended. L foot Dorsi flexion weakness of unclear etiology;  chemo can cause neuropathy not weakness. Seen by Ortho for AFO brace.  (15 Mar 2022 17:59)    Patient is a 62 year old male under care at [facility] Dr. Brad Rojas with stage IV gastric adenocarcinoma (pMMR, HER-2 0+, CPS <1) with mets to the peritoneum. First cycle of chemotherapy was FOLFOX/Nivolumab - patient initially had response but was admitted to [facility] for SOB, cough and found to have progression of disease. CT chest/abdomen/pelvis 3/2/2022 showed significant progression of diease and lymphangitic spread.     Patient received one dose of paclitaxel 80 mg/m2 on 3/4/2022. RT consult was placed but he has not yet seen Dr. Forrest of [facility].    Metastatic Gastric Cancer  --Under care by Dr. Brad Rojas of [facility] (who has been notified of current findings)  --Lymphangitic spread noted on most recent imaging   --Ongoing care after discharge    Pericardial Effusion  --Concern for Tamponade  --Repeat Echo  3/16- effusion appears slightly smaller and no more evidence of tamponade  --CT surgery consultation not needed per Cardiology note  --Cardiology ok to follow as outpatient    COVID-19  --Recently received Remdesivir  --On Prednisone 30mg PO BID  --Continuing to spike fever (early this morning)  --PCR still positive  --On room air  --Ongoing management per ID, pulmonary    Anemia  --Secondary to chronic disease/chemotherapy  --Please transfuse PRBC's for Hgb <7.0 grams    Leukocytosis  --Reactive  --Steroids, infection, malignancy  --No intervention indicated at this time    We will continue to follow patient and coordinate with [facility].    Alejandro Ureña PA-C  Hematology/Oncology  [facility]   [phone] (office)  [phone] (alt office)  Evenings and weekends please call MD on call or office

## 2022-03-17 NOTE — PROGRESS NOTE ADULT - PROBLEM SELECTOR PROBLEM 7
Form completed, OV notes from 9/2/20 attached and awaiting pcp signature tomorrow.    Palliative care encounter

## 2022-03-17 NOTE — CONSULT NOTE ADULT - ASSESSMENT
62M with metastatic gastric adenocarcinoma.   Hospitalized 3/7-3/14 with COVID pneumonia.   Returned 3/15 with a moderate pericardial effusion with tamponade.     Jp Gamez MD   Infectious Disease   Available on TEAMS. After 5PM and on weekends please page fellow on call or call [phone] 62M with metastatic gastric adenocarcinoma.   Here 3/15 for recurring dyspnea and cough in the setting of progression of disease with pulmonary lymphangitic spread, positive COVID PCR 3/7 and now moderate pericardial effusion.   Unclear which is most responsible for his symptoms.   No intervention planned for the effusion.   No hypoxia.   Nontoxic but did have an isolated fever 101.4F on 3/15.   Discharge planning to get chemotherapy outpatient tomorrow.     Suggest  -given fever, port and effusion, would check blood cultures before he goes, ideally two sets if possible   -monitor off antibiotics   -no role for further COVID therapy     Discussed with medicine     Jp Gamez MD   Infectious Disease   Available on TEAMS. After 5PM and on weekends please page fellow on call or call [phone] 62M with metastatic gastric adenocarcinoma.   Here 3/15 for recurring dyspnea and cough in the setting of progression of disease with pulmonary lymphangitic spread, positive COVID PCR 3/7 and now moderate pericardial effusion.   Unclear which is most responsible for his symptoms.   No intervention planned for the effusion.   No hypoxia.   Nontoxic but did have an isolated fever 101.4F on 3/15.   Discharge planning to get chemotherapy outpatient tomorrow.     Suggest  -given fever, port and effusion, would check blood cultures before he goes, ideally two sets if possible   -monitor off antibiotics   -no role for further COVID therapy   -continue Bactrim for PCP prevention while on high dose steroids    Discussed with medicine     Jp Gamez MD   Infectious Disease   Available on TEAMS. After 5PM and on weekends please page fellow on call or call [phone]

## 2022-03-17 NOTE — DISCHARGE NOTE NURSING/CASE MANAGEMENT/SOCIAL WORK - PATIENT PORTAL LINK FT
You can access the FollowMyHealth Patient Portal offered by [facility] by registering at the following website: [email or web address]. By joining [employer]’s FollowMyHealth portal, you will also be able to view your health information using other applications (apps) compatible with our system.

## 2022-03-17 NOTE — PROGRESS NOTE ADULT - PROBLEM SELECTOR PLAN 2
CXR with trace to small b/l pl effusions, similar in size to prior CXR and CTA chest  -Normoxic  -No indication for drainage at this time.
Pt required IV mophine 2mg x1 and 4mg x2 in last 24 hours  In anticipation of discharge recommend:  d/c IV morphone  Continue Oxycodone 5mg q4h PRN moderate pain  Start oxycodone 10mg q4h PRN severe pain  bowel regimen

## 2022-03-17 NOTE — DISCHARGE NOTE PROVIDER - NSDCCPCAREPLAN_GEN_ALL_CORE_FT
PRINCIPAL DISCHARGE DIAGNOSIS  Diagnosis: Pericardial tamponade  Assessment and Plan of Treatment: Pericardial effusion, sometimes referred to as "fluid around the heart," is the abnormal build-up of excess fluid that develops between the pericardium, the lining of the heart, and the heart itself. The seriousness of the condition depends on the primary cause, size and rate of growth of the effusion — and whether it can be treated effectively. Causes that can be treated or controlled, such as an infection due to a virus or heart failure, allows the patient to be effectively treated and remain free of pericardial effusions.  .  Pericardial effusion caused by other conditions, such as cancer, is very serious and should be diagnosed and treated promptly.  Additionally, rapid fluid accumulation in the pericardium can cause cardiac tamponade, a severe compression of the heart that impairs its ability to function.  -  Symptoms of pericardial effusion include:   - Chest pressure or pain   - Shortness of breath   - Nausea   - Abdominal fullness   - Difficulty in swallowing  .  Symptoms that pericardial effusion is causing cardiac tamponade include:   - Blue tinge to the lips and skin   - Shock   - Change in mental status  .  Cardiac tamponade is a severe compression of the heart that impairs its ability to function. Cardiac tamponade resulting from pericardial effusion can be life-threatening and is a medical emergency, requiring urgent drainage of the fluid. Please follow up with Cardiology for further management.        SECONDARY DISCHARGE DIAGNOSES  Diagnosis: Hypertension  Assessment and Plan of Treatment: Hypertension, also known simply as "high blood pressure" is very common, however can lead to many significant complications if left uncontrolled. When the blood pressure is elevated, the force the blood puts on the walls of the arteries is high and can lead to artery damage. Also, when the heart muscle has to pump blood against a high blood pressure, it thickens and enlarges, just like any muscle does when it has to do more work (think of a weight ). When the blood pressure is very high, people may feel a headache or tired. Some people can feel pounding in their head or have blurry vision. Hearing the heart beating in the ear especially at night can be a sign of high blood pressure. Eventually, symptoms of stroke, heart attack, heart failure or irregular heartbeats can occur  - Exercise: Doing cardiovascular exercise such as running, biking or swimming at least 30 minutes per day most days of the week is recommended to help keep blood pressure healthy  - Lose weight: Maintaining a normal BMI (body mass index) is very important in keeping blood pressure readings normal   - Avoid salt: Sodium in the diet increases the blood pressure in many ways. Salt comes in many foods, so just because you don't add salt to your food it does not mean that you are eating a low salt diet. Read labels and keep sodium intake to less than 2000 mg per day   - Avoid alcohol: Even 1 or 2 alcoholic drinks can significantly increase blood pressure   - DASH Diet: The DASH diet has been shown to reduce blood pressure   - Take all medication as prescribed.   - Follow up with your medical doctor for routine blood pressure monitoring at your next visit.   - Notify your doctor if you have any of the following symptoms:    - Dizziness, Lightheadedness, Blurry vision, Headache, Chest pain, Shortness of breath      Diagnosis: Gastric cancer  Assessment and Plan of Treatment: Please follow up with Heme/onc tomorrow 3/18 for chemotherapy    Diagnosis: 2019 novel coronavirus disease (COVID-19)  Assessment and Plan of Treatment: You tested positive for COVID 19.  You no longer require hospitalization. You should quarantine for 10 days.  Please restrict activities outside of your home except for getting medical care.  Do not go to work, school, or public areas.  Avoid using public transportation, ride-sharing, or taxis.  Separate yourself from other people and animals in your home.  Call ahead before visiting your doctor.  Wear a facemask when you are around other people. Cover your cough and sneezes.  Clean your hands often.  Avoid sharing personal household items.  Clean all frequently touched surfaces daily.

## 2022-03-17 NOTE — PROGRESS NOTE ADULT - PROBLEM SELECTOR PLAN 7
Please discharge patient with 7 day supply of following pain medications:  Oxycodone 5mg q4h PRN moderate pain  Start oxycodone 10mg q4h PRN severe pain  bowel regimen    Recommendations discussed with primary team ACP.    For acute issues or uncontrolled symptoms please page palliative team.    Yumiko Gottlieb MD  Geriatrics and Palliative Medicine Attending  [facility] pager: [phone]     The Geriatrics and Palliative Medicine consult service has 24/7 coverage for medical recommendations, including symptom management needs.

## 2022-03-17 NOTE — CONSULT NOTE ADULT - SUBJECTIVE AND OBJECTIVE BOX
HPI:  62M with metastatic gastric adenocarcinoma.   Hospitalized 3/7-3/14 with COVID pneumonia.   Returned 3/15 with a moderate pericardial effusion with tamponade.   Constant chest pain with severe unrelenting cough.       PAST MEDICAL & SURGICAL HISTORY:  Gastric adenocarcinoma  stage 4    Hypertension    S/P IVC filter    Anorectal abscess        Allergies    No Known Allergies    Intolerances        ANTIMICROBIALS:  trimethoprim  160 mG/sulfamethoxazole 800 mG 1 <User Schedule>      OTHER MEDS:  acetaminophen     Tablet .. 650 milliGRAM(s) Oral every 6 hours PRN  aluminum hydroxide/magnesium hydroxide/simethicone Suspension 30 milliLiter(s) Oral every 4 hours PRN  chlorproMAZINE    Tablet 10 milliGRAM(s) Oral three times a day  clonazePAM  Tablet 0.5 milliGRAM(s) Oral at bedtime  codeine 15 milliGRAM(s) Oral every 6 hours  cyclobenzaprine 10 milliGRAM(s) Oral three times a day PRN  enoxaparin Injectable 40 milliGRAM(s) SubCutaneous every 24 hours  guaiFENesin Oral Liquid (Sugar-Free) 200 milliGRAM(s) Oral every 6 hours PRN  hydrocodone/homatropine Syrup 5 milliLiter(s) Oral every 6 hours  losartan 100 milliGRAM(s) Oral daily  melatonin 3 milliGRAM(s) Oral at bedtime PRN  morphine  - Injectable 4 milliGRAM(s) IV Push every 4 hours PRN  ondansetron Injectable 4 milliGRAM(s) IV Push every 8 hours PRN  oxyCODONE    IR 5 milliGRAM(s) Oral every 4 hours PRN  pantoprazole    Tablet 40 milliGRAM(s) Oral before breakfast  polyethylene glycol 3350 17 Gram(s) Oral daily  predniSONE   Tablet 30 milliGRAM(s) Oral two times a day      SOCIAL HISTORY:    FAMILY HISTORY:  FHx: stroke    FH: colon cancer        ROS:  Unobtainable because:   All other systems negative   Constitutional: no fever, no chills  Eye: no vision changes  ENT: no sore throat, no rhinorrhea  Cardiovascular: no chest pain, no palpitation  Respiratory: no SOB, no cough  GI:  no abd pain, no vomiting, no diarrhea  urinary: no dysuria, no hematuria, no flank pain  musculoskeletal: no joint pain, no joint swelling  skin: no rash  neurology: no headache, no change in mental status  psych: no anxiety    Physical Exam:  General: awake, alert, non toxic  Head: atraumatic, normocephalic  Eyes: normal sclera and conjunctiva  ENT: no oropharyngeal lesions, no LAD, neck supple  Cardio: regular rate and rhythm   Respiratory: nonlabored on room air, clear bilaterally, no wheezing  abd: soft, bowel sounds present, not tender  : no suprapubic tenderness, no roldan  Musculoskeletal: no joint swelling, no edema  Skin: no rash  vascular: no phlebitis  Neurologic: no focal deficits  psych: normal affect       Drug Dosing Weight  Height (cm): 165.1 (15 Mar 2022 15:42)  Weight (kg): 63.7 (15 Mar 2022 21:31)  BMI (kg/m2): 23.4 (15 Mar 2022 21:31)  BSA (m2): 1.7 (15 Mar 2022 21:31)    Vital Signs Last 24 Hrs  T(F): 98.2 (22 @ 05:55), Max: 101.4 (03-15-22 @ 21:31)    Vital Signs Last 24 Hrs  HR: 107 (22 @ 05:55) (93 - 107)  BP: 108/95 (22 @ 05:55) (108/95 - 110/73)  RR: 18 (22 @ 05:55)  SpO2: 95% (22 @ 05:55) (95% - 98%)  Wt(kg): --                          9.2    12.27 )-----------( 283      ( 17 Mar 2022 06:59 )             30.0           133<L>  |  95<L>  |  20  ----------------------------<  145<H>  4.2   |  26  |  0.98    Ca    8.7      17 Mar 2022 06:59    TPro  6.2  /  Alb  3.3  /  TBili  1.0  /  DBili  x   /  AST  15  /  ALT  11  /  AlkPhos  76        Urinalysis Basic - ( 16 Mar 2022 11:47 )    Color: Yellow / Appearance: Clear / S.023 / pH: x  Gluc: x / Ketone: Negative  / Bili: Negative / Urobili: Negative   Blood: x / Protein: Trace / Nitrite: Negative   Leuk Esterase: Negative / RBC: x / WBC x   Sq Epi: x / Non Sq Epi: x / Bacteria: x        MICROBIOLOGY:  v  Clean Catch Clean Catch (Midstream)  22   <10,000 CFU/mL Normal Urogenital Yadira  --  --      .Blood Blood-Venous  22   No Growth Final  --  --      .Blood Blood-Venous  22   No Growth Final  --  --                RADIOLOGY:  Images below reviewed personally HPI:  62M with metastatic gastric adenocarcinoma diagnosed 2021.   He's had a chronic cough but worsened and was hospitalized 3/7-3/14 with COVID pneumonia.   Returned 3/15 with continued symptoms and a moderate pericardial effusion with tamponade.   His cough and chest pain are no better. Developing a sore throat from all his coughing.   Had a fever 3/15 night but no chills or malaise.   No diarrhea.   No dysuria.   Has a port.       PAST MEDICAL & SURGICAL HISTORY:  Gastric adenocarcinoma  stage 4    Hypertension    S/P IVC filter    Anorectal abscess        Allergies    No Known Allergies    Intolerances        ANTIMICROBIALS:  trimethoprim  160 mG/sulfamethoxazole 800 mG 1 <User Schedule>      OTHER MEDS:  acetaminophen     Tablet .. 650 milliGRAM(s) Oral every 6 hours PRN  aluminum hydroxide/magnesium hydroxide/simethicone Suspension 30 milliLiter(s) Oral every 4 hours PRN  chlorproMAZINE    Tablet 10 milliGRAM(s) Oral three times a day  clonazePAM  Tablet 0.5 milliGRAM(s) Oral at bedtime  codeine 15 milliGRAM(s) Oral every 6 hours  cyclobenzaprine 10 milliGRAM(s) Oral three times a day PRN  enoxaparin Injectable 40 milliGRAM(s) SubCutaneous every 24 hours  guaiFENesin Oral Liquid (Sugar-Free) 200 milliGRAM(s) Oral every 6 hours PRN  hydrocodone/homatropine Syrup 5 milliLiter(s) Oral every 6 hours  losartan 100 milliGRAM(s) Oral daily  melatonin 3 milliGRAM(s) Oral at bedtime PRN  morphine  - Injectable 4 milliGRAM(s) IV Push every 4 hours PRN  ondansetron Injectable 4 milliGRAM(s) IV Push every 8 hours PRN  oxyCODONE    IR 5 milliGRAM(s) Oral every 4 hours PRN  pantoprazole    Tablet 40 milliGRAM(s) Oral before breakfast  polyethylene glycol 3350 17 Gram(s) Oral daily  predniSONE   Tablet 30 milliGRAM(s) Oral two times a day      SOCIAL HISTORY: Nonsmoker     FAMILY HISTORY:  FHx: stroke  FH: colon cancer    ROS:  All other systems negative   Constitutional: no chills  Eye: no vision changes  ENT: no rhinorrhea  Cardiovascular: chest pain from coughing   Respiratory: per HPI   GI:  no vomiting, no diarrhea  urinary: no dysuria, no hematuria, no flank pain  musculoskeletal: no joint pain, no joint swelling  skin: no rash  neurology: no headache, no change in mental status  psych: no anxiety    Physical Exam:  General: awake, alert, non toxic  Head: atraumatic, normocephalic  Eyes: normal sclera and conjunctiva  ENT: no LAD, neck supple  Cardio: regular rate and rhythm   Respiratory: nonlabored on room air but frequent coughing   abd: soft, not tender  : no suprapubic tenderness, no roldan  Musculoskeletal: no joint swelling, no edema  Skin: no rash  vascular: no phlebitis. right upper chest port   Neurologic: no focal deficits  psych: normal affect       Drug Dosing Weight  Height (cm): 165.1 (15 Mar 2022 15:42)  Weight (kg): 63.7 (15 Mar 2022 21:31)  BMI (kg/m2): 23.4 (15 Mar 2022 21:31)  BSA (m2): 1.7 (15 Mar 2022 21:31)    Vital Signs Last 24 Hrs  T(F): 98.2 (22 @ 05:55), Max: 101.4 (03-15-22 @ 21:31)    Vital Signs Last 24 Hrs  HR: 107 (22 @ 05:55) (93 - 107)  BP: 108/95 (22 @ 05:55) (108/95 - 110/73)  RR: 18 (22 @ 05:55)  SpO2: 95% (22 @ 05:55) (95% - 98%)  Wt(kg): --                          9.2    12.27 )-----------( 283      ( 17 Mar 2022 06:59 )             30.0           133<L>  |  95<L>  |  20  ----------------------------<  145<H>  4.2   |  26  |  0.98    Ca    8.7      17 Mar 2022 06:59    TPro  6.2  /  Alb  3.3  /  TBili  1.0  /  DBili  x   /  AST  15  /  ALT  11  /  AlkPhos  76  03-      Urinalysis Basic - ( 16 Mar 2022 11:47 )    Color: Yellow / Appearance: Clear / S.023 / pH: x  Gluc: x / Ketone: Negative  / Bili: Negative / Urobili: Negative   Blood: x / Protein: Trace / Nitrite: Negative   Leuk Esterase: Negative / RBC: x / WBC x   Sq Epi: x / Non Sq Epi: x / Bacteria: x        MICROBIOLOGY:       RADIOLOGY:  Images below reviewed personally  Xray Chest 1 View- PORTABLE-Urgent (03.15.22 @ 17:06)   Unchanged interstitial opacities and bilateral pleural effusions. This   may be related to pulmonary edema, pneumonia or lymphangitic spread.    CT Angio Chest PE Protocol w/ IV Cont (22 @ 16:19)   No pulmonary embolus.  Conglomerate lymphadenopathy in the mediastinal along with thickening of   the bronchovascular interstitium suggestive of lymphangitic spread.  Small bilateral pleural effusions.  Partially imaged left adrenal mass measuring 4.3 x 3.2 cm consistent with   metastatic disease.

## 2022-03-17 NOTE — CONSULT NOTE ADULT - REASON FOR ADMISSION
pericardial effusion

## 2022-03-17 NOTE — PROGRESS NOTE ADULT - PROBLEM SELECTOR PLAN 1
multifactorial etiology ?COVID vs lymphangitic lung mets vs pericardial effusion  Pulm consult noted  agree with hycodan q6h ATC in addition to codeine q6h ATC   steroids per pulm.

## 2022-03-17 NOTE — DISCHARGE NOTE PROVIDER - CARE PROVIDER_API CALL
Gama Fernandes  INTERNAL MEDICINE  [address] [place] [address]  Phone: [phone]  Fax: [phone]  Follow Up Time: 1 week

## 2022-03-17 NOTE — PROGRESS NOTE ADULT - PROBLEM SELECTOR PLAN 5
Hx stage IV gastric adenocarcinoma w/ mets to peritoneum   -Management per heme/onc.
plan is for f/u with Dr. Rojas at [facility] tomorrow 3/18

## 2022-03-17 NOTE — PROGRESS NOTE ADULT - SUBJECTIVE AND OBJECTIVE BOX
Patient is a 62y old  Male who presents with a chief complaint of pericardial effusion (17 Mar 2022 12:36)    Patient seen this morning. Still coughing. Difficulty catching breath when he speaks.    MEDICATIONS  (STANDING):  chlorproMAZINE    Tablet 10 milliGRAM(s) Oral three times a day  clonazePAM  Tablet 0.5 milliGRAM(s) Oral at bedtime  codeine 15 milliGRAM(s) Oral every 6 hours  enoxaparin Injectable 40 milliGRAM(s) SubCutaneous every 24 hours  hydrocodone/homatropine Syrup 5 milliLiter(s) Oral every 6 hours  losartan 100 milliGRAM(s) Oral daily  pantoprazole    Tablet 40 milliGRAM(s) Oral before breakfast  polyethylene glycol 3350 17 Gram(s) Oral daily  predniSONE   Tablet 30 milliGRAM(s) Oral two times a day  trimethoprim  160 mG/sulfamethoxazole 800 mG 1 Tablet(s) Oral <User Schedule>    MEDICATIONS  (PRN):  acetaminophen     Tablet .. 650 milliGRAM(s) Oral every 6 hours PRN Temp greater or equal to 38C (100.4F), Mild Pain (1 - 3)  aluminum hydroxide/magnesium hydroxide/simethicone Suspension 30 milliLiter(s) Oral every 4 hours PRN Dyspepsia  cyclobenzaprine 10 milliGRAM(s) Oral three times a day PRN Muscle Spasm  guaiFENesin Oral Liquid (Sugar-Free) 200 milliGRAM(s) Oral every 6 hours PRN Cough  melatonin 3 milliGRAM(s) Oral at bedtime PRN Insomnia  morphine  - Injectable 4 milliGRAM(s) IV Push every 4 hours PRN Severe Pain (7 - 10)  ondansetron Injectable 4 milliGRAM(s) IV Push every 8 hours PRN Nausea and/or Vomiting  oxyCODONE    IR 5 milliGRAM(s) Oral every 4 hours PRN Moderate Pain (4 - 6)      ROS  No fever, sweats, chills  No epistaxis, HA, sore throat  Ongoing cough, dyspnea on mild exertion  No n/v/d, abd pain, melena, hematochezia  No edema  No rash  No anxiety  No back pain, joint pain  No bleeding, bruising  No dysuria, hematuria    Vital Signs Last 24 Hrs  T(C): 36.8 (17 Mar 2022 05:55), Max: 36.8 (17 Mar 2022 05:55)  T(F): 98.2 (17 Mar 2022 05:55), Max: 98.2 (17 Mar 2022 05:55)  HR: 107 (17 Mar 2022 05:55) (93 - 107)  BP: 108/95 (17 Mar 2022 05:55) (108/95 - 110/73)  BP(mean): --  RR: 18 (17 Mar 2022 05:55) (18 - 18)  SpO2: 95% (17 Mar 2022 05:55) (95% - 98%)    PE  NAD  Awake, alert  Anicteric, MMM  No c/c/e  No rash grossly                            9.2    12.27 )-----------( 283      ( 17 Mar 2022 06:59 )             30.0       03-17    133<L>  |  95<L>  |  20  ----------------------------<  145<H>  4.2   |  26  |  0.98    Ca    8.7      17 Mar 2022 06:59    TPro  6.2  /  Alb  3.3  /  TBili  1.0  /  DBili  x   /  AST  15  /  ALT  11  /  AlkPhos  76  03-17

## 2022-03-17 NOTE — PROGRESS NOTE ADULT - PROBLEM SELECTOR PLAN 4
+COVID PCR 3/7, 3/15  -Fully vaccinated  -S/p Remdesivir x5 days on prior admission  -Normoxic  -DVT ppx  -Trend inflammatory markers   -Pt with IVC filter in place  -CTA chest 3/7 neg PE, LE duplex 3/10 neg DVT.
s/p remdesivir  steroids per pulm  continue supportive care

## 2022-03-17 NOTE — PROGRESS NOTE ADULT - SUBJECTIVE AND OBJECTIVE BOX
SUBJECTIVE AND OBJECTIVE: Pt sittin comfortably in chair next to bed. States that he continues to have persistent cough which triggers his chest and abdominal pain. Reports adequate pain relief with current regimen.     INTERVAL HPI/OVERNIGHT EVENTS: Pt required IV morphine x2 in 24 hour (8am-8am)    DNR on chart:  Allergies    No Known Allergies    Intolerances    MEDICATIONS  (STANDING):  chlorproMAZINE    Tablet 10 milliGRAM(s) Oral three times a day  clonazePAM  Tablet 0.5 milliGRAM(s) Oral at bedtime  codeine 15 milliGRAM(s) Oral every 6 hours  enoxaparin Injectable 40 milliGRAM(s) SubCutaneous every 24 hours  hydrocodone/homatropine Syrup 5 milliLiter(s) Oral every 6 hours  losartan 100 milliGRAM(s) Oral daily  pantoprazole    Tablet 40 milliGRAM(s) Oral before breakfast  polyethylene glycol 3350 17 Gram(s) Oral daily  predniSONE   Tablet 30 milliGRAM(s) Oral two times a day  trimethoprim  160 mG/sulfamethoxazole 800 mG 1 Tablet(s) Oral <User Schedule>    MEDICATIONS  (PRN):  acetaminophen     Tablet .. 650 milliGRAM(s) Oral every 6 hours PRN Temp greater or equal to 38C (100.4F), Mild Pain (1 - 3)  aluminum hydroxide/magnesium hydroxide/simethicone Suspension 30 milliLiter(s) Oral every 4 hours PRN Dyspepsia  cyclobenzaprine 10 milliGRAM(s) Oral three times a day PRN Muscle Spasm  guaiFENesin Oral Liquid (Sugar-Free) 200 milliGRAM(s) Oral every 6 hours PRN Cough  melatonin 3 milliGRAM(s) Oral at bedtime PRN Insomnia  morphine  - Injectable 4 milliGRAM(s) IV Push every 4 hours PRN Severe Pain (7 - 10)  ondansetron Injectable 4 milliGRAM(s) IV Push every 8 hours PRN Nausea and/or Vomiting  oxyCODONE    IR 5 milliGRAM(s) Oral every 4 hours PRN Moderate Pain (4 - 6)      ITEMS UNCHECKED ARE NOT PRESENT    PRESENT SYMPTOMS: [ ]Unable to self-report - see [ ] CPOT [ ] PAINADS [ ] RDOS  Source if other than patient:  [ ]Family   [ ]Team     Pain: [x]yes [ ]no  QOL impact - yes, impairs sleep   Location - chest                   Aggravating factors - cough  Quality - sharp/stabbing  Radiation - none  Timing- continuous with coughing  Severity (0-10 scale): 10   Minimal acceptable level (0-10 scale): 2-3     CPOT:    https://www.[facility].org/getattachment/[email or web address]/Critical-Care-Pain-Observation-Tool-(CPOT)    PAIN AD Score:	  http://geriatrictoolkit.[email or web address]/painad.pdf (Ctrl + left click to view)    Dyspnea:                           [x]Mild [ ]Moderate [ ]Severe    RDOS:  0 to 2  minimal or no respiratory distress   3  mild distress  4 to 6 moderate distress  >7 severe distress  https://homecareinformation.net/handouts/hen/Respiratory_Distress_Observation_Scale.pdf (Ctrl +  left click to view)     Anxiety:                             [ ]Mild [ ]Moderate [ ]Severe  Fatigue:                             [ ]Mild [ ]Moderate [ ]Severe  Nausea:                             [ ]Mild [ ]Moderate [ ]Severe  Loss of appetite:              [ ]Mild [ ]Moderate [ ]Severe  Constipation:                    [ ]Mild [ ]Moderate [ ]Severe    Other Symptoms:  [x]All other review of systems negative     Palliative Performance Status Version 2:   70      %      http://npcrc.org/files/news/palliative_performance_scale_ppsv2.pdf  PHYSICAL EXAM:  Vital Signs Last 24 Hrs  T(C): 36.6 (17 Mar 2022 13:47), Max: 36.8 (17 Mar 2022 05:55)  T(F): 97.9 (17 Mar 2022 13:47), Max: 98.2 (17 Mar 2022 05:55)  HR: 92 (17 Mar 2022 13:47) (92 - 107)  BP: 95/67 (17 Mar 2022 13:47) (95/67 - 110/73)  BP(mean): --  RR: 18 (17 Mar 2022 13:47) (18 - 18)  SpO2: 99% (17 Mar 2022 13:47) (95% - 99%) I&O's Summary    16 Mar 2022 07:01  -  17 Mar 2022 07:00  --------------------------------------------------------  IN: 650 mL / OUT: 300 mL / NET: 350 mL       GENERAL:  [x]Alert  [x]Oriented x 3  [ ]Lethargic  [ ]Cachexia  [ ]Unarousable  [x]Verbal  [ ]Non-Verbal  Behavioral:   [ ]Anxiety  [ ]Delirium [ ]Agitation [ ]Other  HEENT:  [x]Normal   [ ]Dry mouth   [ ]ET Tube/Trach  [ ]Oral lesions  PULMONARY:   [ ]Clear [ ]Tachypnea  [ ]Audible excessive secretions   [ ]Rhonchi        [ ]Right [ ]Left [ ]Bilateral  [ ]Crackles        [ ]Right [ ]Left [ ]Bilateral  [ ]Wheezing     [ ]Right [ ]Left [ ]Bilateral  [ ]Diminished BS [ ] Right [ ]Left [ ]Bilateral  [x] coarse BS b/l  CARDIOVASCULAR:    [x]Regular [ ]Irregular [ ]Tachy  [ ]Owen [ ]Murmur [ ]Other  GASTROINTESTINAL:  [x]Soft  [ ]Distended   [x]+BS  [x]Non tender [ ]Tender  [ ]PEG [ ]OGT/ NGT   Last BM:  3/15  GENITOURINARY:  [x]Normal [ ]Incontinent   [ ]Oliguria/Anuria   [ ]Daly  MUSCULOSKELETAL:   [ ]Normal   [x]Weakness  [ ]Bed/Wheelchair bound [ ]Edema  NEUROLOGIC:   [x]No focal deficits  [ ] Cognitive impairment  [ ] Dysphagia [ ]Dysarthria [ ] Paresis [ ]Other   SKIN:   [x]Normal  [ ]Rash   [ ]Pressure ulcer(s) [ ]y [ ]n present on admission  CRITICAL CARE:  [ ] Shock Present  [ ]Septic [ ]Cardiogenic [ ]Neurologic [ ]Hypovolemic  [ ]  Vasopressors [ ]  Inotropes   [ ]Respiratory failure present [ ]Mechanical ventilation [ ]Non-invasive ventilatory support [ ]High flow    [ ]Acute  [ ]Chronic [ ]Hypoxic  [ ]Hypercarbic [ ]Other  [ ]Other organ failure   LABS:                        9.2    12.27 )-----------( 283      ( 17 Mar 2022 06:59 )             30.0   03-17    133<L>  |  95<L>  |  20  ----------------------------<  145<H>  4.2   |  26  |  0.98    Ca    8.7      17 Mar 2022 06:59    TPro  6.2  /  Alb  3.3  /  TBili  1.0  /  DBili  x   /  AST  15  /  ALT  11  /  AlkPhos  76  -  PT/INR - ( 15 Mar 2022 16:56 )   PT: 14.9 sec;   INR: 1.28 ratio         PTT - ( 15 Mar 2022 16:56 )  PTT:28.3 sec    Urinalysis Basic - ( 16 Mar 2022 11:47 )    Color: Yellow / Appearance: Clear / S.023 / pH: x  Gluc: x / Ketone: Negative  / Bili: Negative / Urobili: Negative   Blood: x / Protein: Trace / Nitrite: Negative   Leuk Esterase: Negative / RBC: x / WBC x   Sq Epi: x / Non Sq Epi: x / Bacteria: x      RADIOLOGY & ADDITIONAL STUDIES:    < from: Xray Chest 1 View- PORTABLE-Urgent (03.15.22 @ 17:06) >    ACC: [id number] EXAM:  XR CHEST PORTABLE URGENT 1V                          PROCEDURE DATE:  03/15/2022          INTERPRETATION:  EXAMINATION: XR CHEST URGENT    CLINICAL INDICATION: Chest Pain    TECHNIQUE: Single frontal, portable view of the chestwas obtained.    COMPARISON: Chest radiograph 3/7/2022.    FINDINGS:  Mediport tip in the SVC.  The heart size is normal. The cherry are prominent bilaterally.  There are bilateral interstitial opacities, most prominent in the   perihilar regions and lower lung fields.  Bilateral trace pleural effusions.    IMPRESSION:  Unchanged interstitial opacities and bilateral pleural effusions. This   may be related to pulmonary edema, pneumonia or lymphangitic spread.    --- End of Report ---          WILLIAM OLIVEROS MD; Resident Radiology  This document has been electronically signed.  OLGA PINA MD; Attending Radiologist  This document has been electronically signed. Mar 16 2022  7:11AM    < end of copied text >      Protein Calorie Malnutrition Present: [ ]mild [ ]moderate [ ]severe [ ]underweight [ ]morbid obesity  https://www.andeal.org/vault/2440/web/files/ONC/Table_Clinical%20Characteristics%20to%20Document%20Malnutrition-White%20JV%20et%20al%2020.pdf    Height (cm): 165.1 (03-15-22 @ 15:42), 165.1 (03-10-22 @ 05:46)  Weight (kg): 63.7 (03-15-22 @ 21:31), 56.5 (03-10-22 @ :46)  BMI (kg/m2): 23.4 (03-15-22 @ 21:31), 20.7 (03-15-22 @ 15:42), 20.7 (03-10-22 @ :46)    [ ]PPSV2 < or = 30%  [ ]significant weight loss [ ]poor nutritional intake [ ]anasarca[ ]Artificial Nutrition    REFERRALS:   [ ]Chaplaincy  [ ]Hospice  [ ]Child Life  [ ]Social Work  [ ]Case management [ ]Holistic Therapy     Goals of Care Document:

## 2022-03-17 NOTE — PROGRESS NOTE ADULT - ASSESSMENT
61 y/o M with PMH of Stage IV gastric adenocarcinoma w/ mets to peritoneum (dx 11/2021), HTN, Hx of PE (s/p Lovenox & IVC filter - now off Lovenox d/t to recent melena). Recent admission (3/7-3/14) for cough/SOB, found to be COVID+ in ED with lymphangitic spread on CTA chest. S/p course of Remdesivir x5 days, IV steroids for lymphangitic spread. Transitioned to PO Prednisone on discharge with plans for close outpatient f/u. Called by PMD to return due to moderate pericardial effusion with evidence of tamponade physiology on recent TTE.

## 2022-03-17 NOTE — PROGRESS NOTE ADULT - SUBJECTIVE AND OBJECTIVE BOX
Follow-up Pulm Progress Note    No new respiratory events overnight  +dry cough   denies SOB/CP     Medications:  MEDICATIONS  (STANDING):  chlorproMAZINE    Tablet 10 milliGRAM(s) Oral three times a day  clonazePAM  Tablet 0.5 milliGRAM(s) Oral at bedtime  codeine 15 milliGRAM(s) Oral every 6 hours  enoxaparin Injectable 40 milliGRAM(s) SubCutaneous every 24 hours  hydrocodone/homatropine Syrup 5 milliLiter(s) Oral every 6 hours  losartan 100 milliGRAM(s) Oral daily  pantoprazole    Tablet 40 milliGRAM(s) Oral before breakfast  polyethylene glycol 3350 17 Gram(s) Oral daily  predniSONE   Tablet 30 milliGRAM(s) Oral two times a day  trimethoprim  160 mG/sulfamethoxazole 800 mG 1 Tablet(s) Oral <User Schedule>    MEDICATIONS  (PRN):  acetaminophen     Tablet .. 650 milliGRAM(s) Oral every 6 hours PRN Temp greater or equal to 38C (100.4F), Mild Pain (1 - 3)  aluminum hydroxide/magnesium hydroxide/simethicone Suspension 30 milliLiter(s) Oral every 4 hours PRN Dyspepsia  cyclobenzaprine 10 milliGRAM(s) Oral three times a day PRN Muscle Spasm  guaiFENesin Oral Liquid (Sugar-Free) 200 milliGRAM(s) Oral every 6 hours PRN Cough  melatonin 3 milliGRAM(s) Oral at bedtime PRN Insomnia  morphine  - Injectable 4 milliGRAM(s) IV Push every 4 hours PRN Severe Pain (7 - 10)  ondansetron Injectable 4 milliGRAM(s) IV Push every 8 hours PRN Nausea and/or Vomiting  oxyCODONE    IR 5 milliGRAM(s) Oral every 4 hours PRN Moderate Pain (4 - 6)          Vital Signs Last 24 Hrs  T(C): 36.8 (17 Mar 2022 05:55), Max: 36.8 (17 Mar 2022 05:55)  T(F): 98.2 (17 Mar 2022 05:55), Max: 98.2 (17 Mar 2022 05:55)  HR: 107 (17 Mar 2022 05:55) (93 - 107)  BP: 108/95 (17 Mar 2022 05:55) (108/95 - 110/73)  BP(mean): --  RR: 18 (17 Mar 2022 05:55) (18 - 18)  SpO2: 95% (17 Mar 2022 05:55) (95% - 98%)      VBG pH 7.34 03-15 @ 16:19    VBG pCO2 48 03-15 @ 16:19    VBG O2 sat 68.9 03-15 @ 16:19    VBG lactate 3.2 03-15 @ 16:19       @ 07:01  -   @ 07:00  --------------------------------------------------------  IN: 650 mL / OUT: 300 mL / NET: 350 mL          LABS:                        9.2    12.27 )-----------( 283      ( 17 Mar 2022 06:59 )             30.0         133<L>  |  95<L>  |  20  ----------------------------<  145<H>  4.2   |  26  |  0.98    Ca    8.7      17 Mar 2022 06:59    TPro  6.2  /  Alb  3.3  /  TBili  1.0  /  DBili  x   /  AST  15  /  ALT  11  /  AlkPhos  76  17          PT/INR - ( 15 Mar 2022 16:56 )   PT: 14.9 sec;   INR: 1.28 ratio         PTT - ( 15 Mar 2022 16:56 )  PTT:28.3 sec  Urinalysis Basic - ( 16 Mar 2022 11:47 )    Color: Yellow / Appearance: Clear / S.023 / pH: x  Gluc: x / Ketone: Negative  / Bili: Negative / Urobili: Negative   Blood: x / Protein: Trace / Nitrite: Negative   Leuk Esterase: Negative / RBC: x / WBC x   Sq Epi: x / Non Sq Epi: x / Bacteria: x      Procalcitonin, Serum: 0.32 ng/mL (22 @ 11:47)            CULTURES:     Culture - Blood (collected 22 @ 18:32)  Source: .Blood Blood-Venous  Final Report (22 @ 19:00):    No Growth Final    Culture - Blood (collected 22 @ 18:31)  Source: .Blood Blood-Venous  Final Report (22 @ 19:00):    No Growth Final        Culture - Urine (collected 22 @ 22:12)  Source: Clean Catch Clean Catch (Midstream)  Final Report (22 @ 17:11):    <10,000 CFU/mL Normal Urogenital Yadira            Physical Examination:  PULM: coarse bilaterally   CVS: S1, S2 heard        RADIOLOGY REVIEWED  CXR 3/15: b/l opacities grossly unchanged, trace b/l pl effusion    CT chest: < from: CT Angio Chest PE Protocol w/ IV Cont (22 @ 16:19) >  INTERPRETATION:  Reason for Exam: Shortness of breath. chest pain.    CTA of the chest was performed from the thoracic inlet to the level of   the adrenal glands following IV contrast injection of  80 cc of Omnipaque   350. No immediate complications were reported.  MIP images were also   created and reviewed.    Comparison: Chest x-ray of same date    Tubes/Lines: Right Mediport catheter seen with tip in SVC    Mediastinum and Heart: Aorta and pulmonary arteries are normal in size.   Thyroid gland is unremarkable. There is conglomerate lymphadenopathy in   the mediastinum including the prevascular, right left paratracheal, AP   window subcarinal and right hilar stations. The largest area of   lymphadenopathy is conglomerate lymphadenopathy in the right hilum   measuring approximately 3 x 3.9 cm. There is narrowing of the proximal   right and left mainstem bronchi from conglomerate lymphadenopathy. No   pericardial effusion.    Lungs, Pleura, and Airways: There is no pulmonary embolus. Bilateral   thickening of the peribronchial vascular interstitium along with areas of   septal thickening suggestive of lymphangitic spread.    Small bilateral pleural effusions noted.    Visualized Abdomen: IVC filter is noted in place. A partially imaged left   adrenal mass measures 4.3 x 3.2 cm.    Bones and soft tissues: Unremarkable.    IMPRESSION:    No pulmonary embolus.    Conglomerate lymphadenopathy in the mediastinal along with thickening of   the bronchovascular interstitium suggestive of lymphangitic spread.    Small bilateral pleural effusions.    Partially imaged left adrenal mass measuring 4.3 x 3.2 cm consistent with   metastatic disease.    < end of copied text >    TTE: < from: Transthoracic Echocardiogram (22 @ 12:17) >  Dimensions:    Normal Values:  LA:     3.2    2.0 - 4.0 cm  Ao:     3.2    2.0 - 3.8 cm  SEPTUM: 0.9    0.6 - 1.2 cm  PWT:    0.9    0.6 - 1.1 cm  LVIDd:  4.1    3.0 - 5.6 cm  LVIDs:  2.2    1.8 - 4.0 cm  Derived variables:  LVMI: 66 g/m2  RWT: 0.43  Fractional short: 46 %  EF (Visual Estimate): 70-75 %  Doppler Peak Velocity (m/sec): AoV=1.5  ------------------------------------------------------------------------  Observations:  Mitral Valve: Mitral annular calcification, otherwise  normal mitral valve. Minimal mitral regurgitation.  Aortic Valve/Aorta: Calcified aortic valve. Peak  transaortic valve gradient equals 9 mm Hg. No aortic valve  regurgitation seen.  Aortic Root: 3.2 cm.  Left Atrium: Normal left atrium.  Left Ventricle: Normal left ventricular systolic function.  No segmental wall motion abnormalities. Normal left  ventricular internal dimensions and wall thicknesses.  Indeterminate diastolic function.  Right Heart: Normal right atrium. Normal right ventricular  size and function. Normal tricuspid valve. Minimal  tricuspid regurgitation. Normal pulmonic valve.  Pericardium/Pleura: Moderate pericardial effusion.  The  effusion measures 1.7cm anterior to the right ventricle as  seen from the subcostal view.  There is significant inflow  variation measured across the tricuspid valve.  Invagination of the right atrium is present along with  partial right ventricular compression.   Echocardiographic evidence of pericardial tamponade.  ------------------------------------------------------------------------  Conclusions:  1. Mitral annular calcification, otherwise normal mitral  valve. Minimal mitral regurgitation.  2. Calcified aortic valve. No aortic valve regurgitation  seen.  3. Normal left ventricular systolic function. No segmental  wall motion abnormalities.  4. Normal right ventricular size and function.  5. Moderate pericardial effusion.  The effusion measures  1.7cm anterior to the right ventricle as seen from the  subcostal view.  There is significant inflow variation  measured across the tricuspid valve.  Invagination of the  right atrium is present along with partial right  ventricular compression.   Echocardiographic evidence of pericardial tamponade.  *** No previous Echo exam.  Message left for Dr. Rodriguez and Dr. Villanueva.      < end of copied text >  < from: Transthoracic Echocardiogram (22 @ 06:54) >  Dimensions:    Normal Values:  LA:            2.0 - 4.0 cm  Ao:            2.0 - 3.8 cm  SEPTUM:        0.6 - 1.2 cm  PWT:           0.6 - 1.1 cm  LVIDd:         3.0 - 5.6 cm  LVIDs:         1.8 - 4.0 cm  EF (Visual Estimate): 70 %  ------------------------------------------------------------------------  Observations:  Mitral Valve: Normal mitral valve.  Aortic Valve/Aorta: Normal aortic valve.  Normal aortic root.  Left Atrium: Normal left atrium.  Left Ventricle: Normal left ventricular internal dimensions  and wall thicknesses.  Normal left ventricular systolic function. No segmental  wall motion abnormalities.  Right Heart: Normal right atrium. Normal right ventricular  size and function.  Normal tricuspid valve. Mild tricuspid regurgitation.  Pericardium/Pleura: Small circumferential pericardial  effusion.  Bilateral pleural effusions.  Hemodynamic: Estimated right atrialpressure is normal.  ------------------------------------------------------------------------  Conclusions:  Normal left ventricular systolic function. No segmental  wall motion abnormalities.  Small circumferential pericardial effusion.  *** Compared with echocardiogram of 3/14/2022, the effusion  appears slightly smaller.    < end of copied text >

## 2022-03-17 NOTE — PROGRESS NOTE ADULT - SUBJECTIVE AND OBJECTIVE BOX
[facility]    Kanchan Meehan, David, Brenda, Ana Maria, Michael, Clif      [phone]    CHIEF COMPLAINT: Patient is a 62y old  Male who presents with a chief complaint of pericardial effusion (16 Mar 2022 13:11)      Follow Up: pleural and pericardial effusion    Interim history: The patient reports no new symptoms.  Ongong pleuritic/cough related chest discomfort.  No shortness of breath.  No abdominal pain.  No new neurologic symptoms.      MEDICATIONS  (STANDING):  chlorproMAZINE    Tablet 10 milliGRAM(s) Oral three times a day  clonazePAM  Tablet 0.5 milliGRAM(s) Oral at bedtime  codeine 15 milliGRAM(s) Oral every 6 hours  enoxaparin Injectable 40 milliGRAM(s) SubCutaneous every 24 hours  hydrocodone/homatropine Syrup 5 milliLiter(s) Oral every 6 hours  losartan 100 milliGRAM(s) Oral daily  pantoprazole    Tablet 40 milliGRAM(s) Oral before breakfast  polyethylene glycol 3350 17 Gram(s) Oral daily  predniSONE   Tablet 30 milliGRAM(s) Oral two times a day  trimethoprim  160 mG/sulfamethoxazole 800 mG 1 Tablet(s) Oral <User Schedule>    MEDICATIONS  (PRN):  acetaminophen     Tablet .. 650 milliGRAM(s) Oral every 6 hours PRN Temp greater or equal to 38C (100.4F), Mild Pain (1 - 3)  aluminum hydroxide/magnesium hydroxide/simethicone Suspension 30 milliLiter(s) Oral every 4 hours PRN Dyspepsia  cyclobenzaprine 10 milliGRAM(s) Oral three times a day PRN Muscle Spasm  guaiFENesin Oral Liquid (Sugar-Free) 200 milliGRAM(s) Oral every 6 hours PRN Cough  melatonin 3 milliGRAM(s) Oral at bedtime PRN Insomnia  morphine  - Injectable 4 milliGRAM(s) IV Push every 4 hours PRN Severe Pain (7 - 10)  ondansetron Injectable 4 milliGRAM(s) IV Push every 8 hours PRN Nausea and/or Vomiting  oxyCODONE    IR 5 milliGRAM(s) Oral every 4 hours PRN Moderate Pain (4 - 6)      REVIEW OF SYSTEMS:  eye, ent, GI, , allergic, dermatologic, musculoskeletal and neurologic are negative except as described above    Vital Signs Last 24 Hrs  T(C): 36.8 (17 Mar 2022 05:55), Max: 36.8 (17 Mar 2022 05:55)  T(F): 98.2 (17 Mar 2022 05:55), Max: 98.2 (17 Mar 2022 05:55)  HR: 107 (17 Mar 2022 05:55) (93 - 107)  BP: 108/95 (17 Mar 2022 05:55) (104/67 - 110/73)  BP(mean): --  RR: 18 (17 Mar 2022 05:55) (18 - 18)  SpO2: 95% (17 Mar 2022 05:55) (95% - 98%)    I&O's Summary    16 Mar 2022 07:01  -  17 Mar 2022 07:00  --------------------------------------------------------  IN: 650 mL / OUT: 300 mL / NET: 350 mL        Telemetry past 24h: sr    PHYSICAL EXAM:    Constitutional: well-nourished, well-developed, NAD   HEENT:  MMM, sclerae anicteric, conjunctivae clear, no oral cyanosis.  Pulmonary: Non-labored, breath sounds are clear bilaterally, No wheezing, rales or rhonchi  Cardiovascular: Regular, S1 and S2.  No murmur.  No rubs, gallops or clicks  Gastrointestinal: Bowel Sounds present, soft, nontender.   Lymph: No peripheral edema.   Neurological: Alert, no focal deficits  Skin: No rashes.  Psych:  Mood & affect appropriate    LABS: All Labs Reviewed:                        9.2    12.27 )-----------( 283      ( 17 Mar 2022 06:59 )             30.0                         9.7    13.48 )-----------( 309      ( 16 Mar 2022 06:50 )             31.4                         9.7    15.71 )-----------( 367      ( 15 Mar 2022 16:19 )             31.9     17 Mar 2022 06:59    133    |  95     |  20     ----------------------------<  145    4.2     |  26     |  0.98   16 Mar 2022 06:47    135    |  98     |  20     ----------------------------<  119    4.6     |  25     |  0.92   15 Mar 2022 16:19    138    |  99     |  22     ----------------------------<  105    5.2     |  25     |  1.11     Ca    8.7        17 Mar 2022 06:59  Ca    8.8        16 Mar 2022 06:47  Ca    9.0        15 Mar 2022 16:19    TPro  6.2    /  Alb  3.3    /  TBili  1.0    /  DBili  x      /  AST  15     /  ALT  11     /  AlkPhos  76     17 Mar 2022 06:59  TPro  6.4    /  Alb  3.5    /  TBili  0.8    /  DBili  x      /  AST  21     /  ALT  20     /  AlkPhos  85     15 Mar 2022 16:19    PT/INR - ( 15 Mar 2022 16:56 )   PT: 14.9 sec;   INR: 1.28 ratio         PTT - ( 15 Mar 2022 16:56 )  PTT:28.3 sec      Blood Culture:         RADIOLOGY:    EKG:    Echo:    < from: Transthoracic Echocardiogram (03.16.22 @ 06:54) >    Patient name: CECY LEE  YOB: 1960   Age: 62 (M)   MR#: [id number]  Study Date: 3/16/2022  Location: [place] [address]  DSonographer: TEQUILA De La Vega  Study quality: Technically good  Referring Physician: Ari Villanueva MD  Blood Pressure: 113/75 mmHg  Height: 165 cm  Weight: 56 kg  BSA: 1.6 m2  ------------------------------------------------------------------------  PROCEDURE: Transthoracic echocardiogram with 2-D, M-Mode  and complete spectral and color flow Doppler.  INDICATION: Cardiac tamponade (I31.4)  ------------------------------------------------------------------------  Dimensions:    Normal Values:  LA:            2.0 - 4.0 cm  Ao:            2.0 - 3.8 cm  SEPTUM:        0.6 - 1.2 cm  PWT:           0.6 - 1.1 cm  LVIDd:         3.0 - 5.6 cm  LVIDs:         1.8 - 4.0 cm  EF (Visual Estimate): 70 %  ------------------------------------------------------------------------  Observations:  Mitral Valve: Normal mitral valve.  Aortic Valve/Aorta: Normal aortic valve.  Normal aortic root.  Left Atrium: Normal left atrium.  Left Ventricle: Normal left ventricular internal dimensions  and wall thicknesses.  Normal left ventricular systolic function. No segmental  wall motion abnormalities.  Right Heart: Normal right atrium. Normal right ventricular  size and function.  Normal tricuspid valve. Mild tricuspid regurgitation.  Pericardium/Pleura: Small circumferential pericardial  effusion.  Bilateral pleural effusions.  Hemodynamic: Estimated right atrialpressure is normal.  ------------------------------------------------------------------------  Conclusions:  Normal left ventricular systolic function. No segmental  wall motion abnormalities.  Small circumferential pericardial effusion.  *** Compared with echocardiogram of 3/14/2022, the effusion  appears slightly smaller.  ------------------------------------------------------------------------  Confirmed on  3/16/2022 - 09:50:49 by Julien Cisneros MD,  MARIAN  ------------------------------------------------------------------------    < end of copied text >

## 2022-03-17 NOTE — DISCHARGE NOTE PROVIDER - HOSPITAL COURSE
61yo M PMHx of Stage IV gastric adenocarcinoma w/ mets to peritoneum, HTN, Hx of PE (s/p Lovenox & IVC filter) recent admission for COVID and progression of disease, sent in by PMD after TTE was found to have a moderate pericardial effusion with echocardiographic evidence of tamponade. Patient seen and evaluated by Cards.     # pericardial effusion  already on steroids to cover pericarditis  cardio following  TTE shows improved small effusion     # Gastric cancer Stage 4 with mets  onc following  progression of disease in most recent imaging  pain poorly controlled, appreciate palliative care recs, will fu re pain regimen  has chemo outpt tomorrow  bactrim ppx    # Hypertension  holding Losartan for borderline BP    # 2019 novel coronavirus disease (COVID-19)  completed remdesivir prior admission  continue Prednisone 30 BID per pulm  BCx/UCx NGTD  outpt fu Dr. Christensen  d dimer downtrending    # L foot Dorsi flexion weakness of unclear etiology  neurology evaluated last admission   MRI brain neg  MRI L spine with non specific enhancement L5/S1   neuro recommended AFO brace for left foot,  repeat MRI LS spine in 6 months     PCP Dr. Fernandes    Patient remains stable for DC with close followup with PCP, Heme/onc, and Pulm as per Dr. Gerardo

## 2022-03-17 NOTE — PROGRESS NOTE ADULT - SUBJECTIVE AND OBJECTIVE BOX
SUBJECTIVE / OVERNIGHT EVENTS:    no events overnight  patient seen and examined  OOB in chair  c/o pain at this time    --------------------------------------------------------------------------------------------  LABS:                        9.2    12.27 )-----------( 283      ( 17 Mar 2022 06:59 )             30.0     03-17    133<L>  |  95<L>  |  20  ----------------------------<  145<H>  4.2   |  26  |  0.98    Ca    8.7      17 Mar 2022 06:59    TPro  6.2  /  Alb  3.3  /  TBili  1.0  /  DBili  x   /  AST  15  /  ALT  11  /  AlkPhos  76  03-17    PT/INR - ( 15 Mar 2022 16:56 )   PT: 14.9 sec;   INR: 1.28 ratio         PTT - ( 15 Mar 2022 16:56 )  PTT:28.3 sec  CAPILLARY BLOOD GLUCOSE            Urinalysis Basic - ( 16 Mar 2022 11:47 )    Color: Yellow / Appearance: Clear / S.023 / pH: x  Gluc: x / Ketone: Negative  / Bili: Negative / Urobili: Negative   Blood: x / Protein: Trace / Nitrite: Negative   Leuk Esterase: Negative / RBC: x / WBC x   Sq Epi: x / Non Sq Epi: x / Bacteria: x        RADIOLOGY & ADDITIONAL TESTS:    Imaging Personally Reviewed:  [x] YES  [ ] NO    Consultant(s) Notes Reviewed:  [x] YES  [ ] NO    MEDICATIONS  (STANDING):  chlorproMAZINE    Tablet 10 milliGRAM(s) Oral three times a day  clonazePAM  Tablet 0.5 milliGRAM(s) Oral at bedtime  codeine 15 milliGRAM(s) Oral every 6 hours  enoxaparin Injectable 40 milliGRAM(s) SubCutaneous every 24 hours  hydrocodone/homatropine Syrup 5 milliLiter(s) Oral every 6 hours  losartan 100 milliGRAM(s) Oral daily  pantoprazole    Tablet 40 milliGRAM(s) Oral before breakfast  polyethylene glycol 3350 17 Gram(s) Oral daily  predniSONE   Tablet 30 milliGRAM(s) Oral two times a day  trimethoprim  160 mG/sulfamethoxazole 800 mG 1 Tablet(s) Oral <User Schedule>    MEDICATIONS  (PRN):  acetaminophen     Tablet .. 650 milliGRAM(s) Oral every 6 hours PRN Temp greater or equal to 38C (100.4F), Mild Pain (1 - 3)  aluminum hydroxide/magnesium hydroxide/simethicone Suspension 30 milliLiter(s) Oral every 4 hours PRN Dyspepsia  cyclobenzaprine 10 milliGRAM(s) Oral three times a day PRN Muscle Spasm  guaiFENesin Oral Liquid (Sugar-Free) 200 milliGRAM(s) Oral every 6 hours PRN Cough  melatonin 3 milliGRAM(s) Oral at bedtime PRN Insomnia  morphine  - Injectable 4 milliGRAM(s) IV Push every 4 hours PRN Severe Pain (7 - 10)  ondansetron Injectable 4 milliGRAM(s) IV Push every 8 hours PRN Nausea and/or Vomiting  oxyCODONE    IR 5 milliGRAM(s) Oral every 4 hours PRN Moderate Pain (4 - 6)      Care Discussed with Consultants/Other Providers [x] YES  [ ] NO    Vital Signs Last 24 Hrs  T(C): 36.8 (17 Mar 2022 05:55), Max: 36.8 (17 Mar 2022 05:55)  T(F): 98.2 (17 Mar 2022 05:55), Max: 98.2 (17 Mar 2022 05:55)  HR: 107 (17 Mar 2022 05:55) (93 - 107)  BP: 108/95 (17 Mar 2022 05:55) (108/95 - 110/73)  BP(mean): --  RR: 18 (17 Mar 2022 05:55) (18 - 18)  SpO2: 95% (17 Mar 2022 05:55) (95% - 98%)  I&O's Summary    16 Mar 2022 07:01  -  17 Mar 2022 07:00  --------------------------------------------------------  IN: 650 mL / OUT: 300 mL / NET: 350 mL    PHYSICAL EXAM:  GENERAL: NAD, AAOx3  HEAD:  Atraumatic, Normocephalic  NECK: No JVD  CHEST/LUNG: CTABL, No wheeze  HEART: Regular rate and rhythm; no murmur  ABDOMEN: Soft, Nontender, Nondistended; Bowel sounds present  EXTREMITIES:  2+ Peripheral Pulses, No clubbing, cyanosis, or edema  SKIN: No rashes or lesions  NEURO: No focal deficits         SUBJECTIVE / OVERNIGHT EVENTS:    no events overnight  patient seen and examined  OOB in chair  c/o pain at this time  has only received one dose of morphine    --------------------------------------------------------------------------------------------  LABS:                        9.2    12.27 )-----------( 283      ( 17 Mar 2022 06:59 )             30.0     03-17    133<L>  |  95<L>  |  20  ----------------------------<  145<H>  4.2   |  26  |  0.98    Ca    8.7      17 Mar 2022 06:59    TPro  6.2  /  Alb  3.3  /  TBili  1.0  /  DBili  x   /  AST  15  /  ALT  11  /  AlkPhos  76  03-17    PT/INR - ( 15 Mar 2022 16:56 )   PT: 14.9 sec;   INR: 1.28 ratio         PTT - ( 15 Mar 2022 16:56 )  PTT:28.3 sec  CAPILLARY BLOOD GLUCOSE            Urinalysis Basic - ( 16 Mar 2022 11:47 )    Color: Yellow / Appearance: Clear / S.023 / pH: x  Gluc: x / Ketone: Negative  / Bili: Negative / Urobili: Negative   Blood: x / Protein: Trace / Nitrite: Negative   Leuk Esterase: Negative / RBC: x / WBC x   Sq Epi: x / Non Sq Epi: x / Bacteria: x        RADIOLOGY & ADDITIONAL TESTS:    Imaging Personally Reviewed:  [x] YES  [ ] NO    Consultant(s) Notes Reviewed:  [x] YES  [ ] NO    MEDICATIONS  (STANDING):  chlorproMAZINE    Tablet 10 milliGRAM(s) Oral three times a day  clonazePAM  Tablet 0.5 milliGRAM(s) Oral at bedtime  codeine 15 milliGRAM(s) Oral every 6 hours  enoxaparin Injectable 40 milliGRAM(s) SubCutaneous every 24 hours  hydrocodone/homatropine Syrup 5 milliLiter(s) Oral every 6 hours  losartan 100 milliGRAM(s) Oral daily  pantoprazole    Tablet 40 milliGRAM(s) Oral before breakfast  polyethylene glycol 3350 17 Gram(s) Oral daily  predniSONE   Tablet 30 milliGRAM(s) Oral two times a day  trimethoprim  160 mG/sulfamethoxazole 800 mG 1 Tablet(s) Oral <User Schedule>    MEDICATIONS  (PRN):  acetaminophen     Tablet .. 650 milliGRAM(s) Oral every 6 hours PRN Temp greater or equal to 38C (100.4F), Mild Pain (1 - 3)  aluminum hydroxide/magnesium hydroxide/simethicone Suspension 30 milliLiter(s) Oral every 4 hours PRN Dyspepsia  cyclobenzaprine 10 milliGRAM(s) Oral three times a day PRN Muscle Spasm  guaiFENesin Oral Liquid (Sugar-Free) 200 milliGRAM(s) Oral every 6 hours PRN Cough  melatonin 3 milliGRAM(s) Oral at bedtime PRN Insomnia  morphine  - Injectable 4 milliGRAM(s) IV Push every 4 hours PRN Severe Pain (7 - 10)  ondansetron Injectable 4 milliGRAM(s) IV Push every 8 hours PRN Nausea and/or Vomiting  oxyCODONE    IR 5 milliGRAM(s) Oral every 4 hours PRN Moderate Pain (4 - 6)      Care Discussed with Consultants/Other Providers [x] YES  [ ] NO    Vital Signs Last 24 Hrs  T(C): 36.8 (17 Mar 2022 05:55), Max: 36.8 (17 Mar 2022 05:55)  T(F): 98.2 (17 Mar 2022 05:55), Max: 98.2 (17 Mar 2022 05:55)  HR: 107 (17 Mar 2022 05:55) (93 - 107)  BP: 108/95 (17 Mar 2022 05:55) (108/95 - 110/73)  BP(mean): --  RR: 18 (17 Mar 2022 05:55) (18 - 18)  SpO2: 95% (17 Mar 2022 05:55) (95% - 98%)  I&O's Summary    16 Mar 2022 07:01  -  17 Mar 2022 07:00  --------------------------------------------------------  IN: 650 mL / OUT: 300 mL / NET: 350 mL    PHYSICAL EXAM:  GENERAL: NAD, AAOx3  HEAD:  Atraumatic, Normocephalic  NECK: No JVD  CHEST/LUNG: CTABL, No wheeze  HEART: Regular rate and rhythm; no murmur  ABDOMEN: Soft, Nontender, Nondistended; Bowel sounds present  EXTREMITIES:  2+ Peripheral Pulses, No clubbing, cyanosis, or edema  SKIN: No rashes or lesions  NEURO: No focal deficits

## 2022-03-17 NOTE — DISCHARGE NOTE PROVIDER - NSDCFUADDAPPT_GEN_ALL_CORE_FT
Please follow up with Heme/onc tomorrow on 3/18 for scheduled chemotherapy.  Please follow up with Pulm as scheduled in a week.

## 2022-03-17 NOTE — PROGRESS NOTE ADULT - ASSESSMENT
63yo M w/ PMHx of Stage IV gastric adenocarcinoma w/ mets to peritoneum, HTN, Hx of PE (s/p Lovenox & IVC filter) sent in by PMD after TTE was found to have a moderate pericardial effusion with echocardiographic evidence of tamponade. Pt recently admitted for SOB/cough and found to have anemia and COVID ,prior to last admission CT C/A/P on 3/2 showed significant progression of diease and lymphangitic spread. Pt continues to have unrelenting cough with associated chest pain. Palliative consulted for symptom management in patient with advanced illness.

## 2022-03-17 NOTE — DISCHARGE NOTE PROVIDER - NSDCMRMEDTOKEN_GEN_ALL_CORE_FT
benzonatate 100 mg oral capsule: 2 cap(s) orally 3 times a day, As needed, Cough  chlorproMAZINE 10 mg oral tablet: 1 tab(s) orally 3 times a day  clonazePAM 0.5 mg oral tablet: 1 tab(s) orally once a day (at bedtime)  codeine sulfate 15 mg oral tablet: 1 tab(s) orally every 6 hours MDD:4  cyclobenzaprine 10 mg oral tablet: 1 tab(s) orally 3 times a day, As needed, Muscle Spasm  Hycodan 5 mg-1.5 mg/5 mL oral syrup: 5 milliliter(s) orally every 6 hours MDD:20mL  oxyCODONE 10 mg oral tablet: 1 tab(s) orally every 4 hours for severe pain MDD:6  oxyCODONE 5 mg oral tablet: 1 tab(s) orally every 4 hours, As needed, Moderate Pain (4 - 6) MDD:6  pantoprazole 40 mg oral delayed release tablet: 1 tab(s) orally once a day  polyethylene glycol 3350 oral powder for reconstitution: 17 gram(s) orally once a day  predniSONE 10 mg oral tablet: 3 tab(s) orally 2 times a day  prochlorperazine 10 mg oral tablet: orally 3 times a day, As Needed  sulfamethoxazole-trimethoprim 800 mg-160 mg oral tablet: 1 tab(s) orally 3 times a week (Mon, Wed, Fri)   telmisartan 80 mg oral tablet: 1 tab(s) orally once a day

## 2022-03-22 PROBLEM — Z00.00 ENCOUNTER FOR PREVENTIVE HEALTH EXAMINATION: Status: ACTIVE | Noted: 2022-03-22

## 2022-03-22 LAB
CULTURE RESULTS: SIGNIFICANT CHANGE UP
CULTURE RESULTS: SIGNIFICANT CHANGE UP
SPECIMEN SOURCE: SIGNIFICANT CHANGE UP
SPECIMEN SOURCE: SIGNIFICANT CHANGE UP

## 2022-04-11 ENCOUNTER — APPOINTMENT (OUTPATIENT)
Dept: CARDIOLOGY | Facility: CLINIC | Age: 62
End: 2022-04-11

## 2022-10-14 NOTE — PROGRESS NOTE ADULT - PROBLEM SELECTOR PROBLEM 4
2019 novel coronavirus disease (COVID-19)
2019 novel coronavirus disease (COVID-19)
[FreeTextEntry2] : new patient l-spine

## 2023-08-23 NOTE — ED ADULT NURSE NOTE - NSSEPSISSUSPECTED_ED_A_ED
Topical recommendations:   ---Endotracheal tube: Inspect vulnerable skin/mucosa every 2 hours with repositioning of tube; provide mouth care with each tube repositioning. Change ETT wiley q3days or prn.   ---Abdominal pannus, groin: After cleansing, apply Interdry textile sheeting, under intertriginous folds leaving 2 inches exposed at ends to wick, remove to wash & dry affected area, then replace. Individual sheeting may be used for up to 5 days unless soiled. Inspect skin daily.    ---Sacrum, perineum, bilateral buttocks: Cleanse with skin cleanser, pat dry. Apply Critic-aid moisture barrier ointment twice a day and PRN with incontinent episodes.   ---Right foot: apply liquid barrier film daily to heel and areas of blanching erythema, offload both heels with positioning pillows.   ---Continue low air loss bed therapy, continue heel elevation, continue to turn & reposition per protocol, soft pillow between bony prominences, continue moisture management with barrier creams & single breathable pad, continue measures to decrease friction/shear/pressure.    Plan discussed with patient's family and primary RN Debbie Granda at bedside. All questions answered.  Please contact Wound/Ostomy Care Service Line if we can be of further assistance (ext [phone]). Please reconsult if changes to tissue type noted.  Yes

## 2023-10-31 NOTE — H&P ADULT - PROBLEM/PLAN-1
89 year old female with PMH of HTN, Afib, OA, PVD, COPD, Anxiety, Obesity, Acute on chronic systolic congestive heart failure, H/O abdominal hysterectomy, H/O discectomy, H/O total knee replacement, bilaterally and bilateral pleural effusions (s/p left thoracentesis 3/2023 -- Cytology -PLEURAL FLUID, THORACENTESIS: - SUSPICIOUS FOR MALIGNANCY) with a left sided pleurex catheter, presented for fever and dyspnea.    ID is consulted for septic shock  Febrile to 104.5 on admission, significant leukocytosis to 39.5K  Hypotension requiring Levophed  BCx (10/22) 4/4 bottles with KPC Klebsiella spp.  BCx (10/26, 10/28) NGTD  UA showed mild pyuria, UCx with 100K KPC Klebsiella  COVID/flu negative  MRSA nare PCR negative  CXR showed increased bilateral opacities/effusions    Antibiotics:  Vancomycin 10/23  Levaquin 10/23  Cefepime 10/23  Avycaz 10/23 ->    Overall KPC Klebsiella bacteremia, possibly 2/2 pneumonia vs UTI  Downgraded from ICU, clinically improving, afebrile  Uptrending leukocytosis, possibly from urinary retention?       IMPRESSION:  KPC Klebsiella bacteremia, potentially life-threatening  Pneumonia, possible parapneumonic effusion  UTI  Leukocytosis    RECOMMENDATIONS:  - Continue IV Avycaz to 2.5gm q8hrs, plan for 2 weeks from 10/26  - Intermittent cath as needed for retention  - Trend WBC, fever curve, transaminases, creatinine daily  - Will continue to follow      * THIS IS AN INCOMPLETE NOTE. FINAL RECOMMENDATION IS PENDING *         89 year old female with PMH of HTN, Afib, OA, PVD, COPD, Anxiety, Obesity, Acute on chronic systolic congestive heart failure, H/O abdominal hysterectomy, H/O discectomy, H/O total knee replacement, bilaterally and bilateral pleural effusions (s/p left thoracentesis 3/2023 -- Cytology -PLEURAL FLUID, THORACENTESIS: - SUSPICIOUS FOR MALIGNANCY) with a left sided pleurex catheter, presented for fever and dyspnea.    ID is consulted for septic shock  Febrile to 104.5 on admission, significant leukocytosis to 39.5K  Hypotension requiring Levophed  BCx (10/22) 4/4 bottles with KPC Klebsiella spp.  BCx (10/26, 10/28) NGTD  UA showed mild pyuria, UCx with 100K KPC Klebsiella  COVID/flu negative  MRSA nare PCR negative  CXR showed increased bilateral opacities/effusions    Antibiotics:  Vancomycin 10/23  Levaquin 10/23  Cefepime 10/23  Avycaz 10/23 ->    Overall KPC Klebsiella bacteremia, possibly 2/2 pneumonia vs UTI  Downgraded from ICU, clinically improving, afebrile  Uptrending leukocytosis, possibly from urinary retention?       IMPRESSION:  KPC Klebsiella bacteremia, potentially life-threatening  Pneumonia, possible parapneumonic effusion  UTI  Leukocytosis    RECOMMENDATIONS:  - Continue IV Avycaz to 2.5gm q8hrs, plan for 2 weeks from 10/26  - Intermittent cath as needed for retention  - Wean off O2 as tolerated  - Offloading and frequent position changes, aspiration precaution  - Trend WBC, fever curve, transaminases, creatinine daily  - Will continue to follow      Micaela Melo D.O.  Attending Physician  Division of Infectious Diseases  [facility]  Please contact me via [employer] Teams           DISPLAY PLAN FREE TEXT

## 2024-06-10 NOTE — ED ADULT NURSE NOTE - NS ED NURSE LEVEL OF CONSCIOUSNESS MENTAL STATUS
DISCHARGE SUMMARY    Discharge Diagnosis:    Pregnancy at 38w3d delivered - complications - without problems    Reason for Hospitalization:   onset of labor and/or spontaneous rupture of membranes    A review of her prenatal record showed the following issues:   Patient Active Problem List   Diagnosis    Situational anxiety    Normal intrauterine pregnancy, antepartum (CMD)    Chronic headaches    History of melanoma    Supervision of normal first pregnancy, antepartum (CMD)    Abnormal [place] glucose challenge test, antepartum (CMD)          Delivery Information:  Refer to the delivery summary and delivery note for details.    Significant Findings:  Liveborn Gill female     Procedures Performed: spontaneous vaginal delivery and laceration repair      Condition on Discharge:  Stable and recovering.    Disposition:   Home    Discharge Instructions:  May use acetaminophen or ibuprofen as desired for pain relief. Call for breast infection, unexplained high fevers, burning or pain when you urinate, excessive bleeding lasting more than one hour or signs of postpartum depression.     Follow up:  Follow-up appointment in six weeks for a postpartum check up.    Yesenia Frey MD              
Awake/Alert

## 2025-04-09 NOTE — PROGRESS NOTE ADULT - PROBLEM SELECTOR PLAN 3
Nery Jarrell  1952  [id number]    Hospitalists Discharge Summary    Date of Admission: 4/8/2025  Date of Discharge:  4/9/2025    Primary Discharge Diagnoses: ***    Secondary Discharge Diagnoses: ***      History of Present Illness:***    Hospital Course:      PCP  Patient Care Team:  Cornelio Rosa APRN as PCP - General (Nurse Practitioner)  Luz Whaley MD as Consulting Physician (Obstetrics and Gynecology)  Iggy Serna MD as Consulting Physician (Gastroenterology)  Abimael Gutiérrez MD as Consulting Physician (Gastroenterology)  Bacilio Hartley MD as Consulting Physician (Cardiology)  Vidal Krishnan MD as Consulting Physician (Colon and Rectal Surgery)    Consults:   Consults       Date and Time Order Name Status Description    4/8/2025  4:47 PM Inpatient Neurology Consult Stroke Completed             Operations and Procedures Performed:       Adult Transthoracic Echo Complete W/ Cont if Necessary Per Protocol  Result Date: 4/9/2025  Narrative:   Left ventricular systolic function is normal. Calculated left ventricular EF = 58.1%   Left ventricular wall thickness is consistent with mild concentric hypertrophy.   Left ventricular diastolic function was normal.   Left atrial volume is mildly increased.   Saline test results are negative.   Estimated right ventricular systolic pressure from tricuspid regurgitation is normal (<35 mmHg). Calculated right ventricular systolic pressure from tricuspid regurgitation is 9 mmHg.     MRI Angiogram Neck With & Without Contrast  Result Date: 4/8/2025  Narrative: MRI ANGIOGRAM NECK W WO CONTRAST Date of Exam: 4/8/2025 2:48 PM EDT Indication: dizzy, coordination diff with hands.  Comparison: None available. Technique:  Routine 3-D time-of-flight gradient echo imaging was obtained of the neck before and after the uneventful administration of Multihance. Findings: Aortic arch: Conventional, three-vessel, aortic arch. Subclavian arteries: No 
flow-limiting stenosis. No occlusion. Carotid arteries: No flow-limiting stenosis. No occlusion. Approximately 50% stenosis of the right internal carotid artery by NASCET criteria at its origin. Less than 50% narrowing of the left internal carotid artery by NASCET criteria at its origin. Vertebral arteries: No flow-limiting stenosis. No occlusion. Included intracranial arteries: No flow-limiting stenosis. No occlusion.     Impression: Impression: No large vessel occlusion or severe stenosis of the major arteries of the neck. Approximately 50% stenosis of the right internal carotid artery at its origin. Electronically Signed: Terence Carlson  4/8/2025 4:18 PM EDT  Workstation ID: [id number]    MRI Angiogram Head Without Contrast  Result Date: 4/8/2025  Narrative: MRI ANGIOGRAM HEAD WO CONTRAST Date of Exam: 4/8/2025 2:47 PM EDT Indication: dizzy, coordination diff with hands.  Comparison: MRI brain from today Technique:  Routine 3-D time-of-flight gradient echo imaging was obtained of the head without contrast administration. Findings: The bilateral intracranial internal carotid, bilateral middle cerebral, bilateral anterior cerebral, and anterior communicating arteries are widely patent. The bilateral intracranial vertebral, basilar, and bilateral posterior cerebral arteries are widely patent. No definite posterior communicating artery is identified on either side. No intracranial aneurysm is identified. The visualized portions of the bilateral superior cerebellar, anterior inferior cerebellar, and posterior inferior cerebellar arteries are unremarkable.     Impression: Impression: Major arterial vasculature within head appears widely patent, with no thrombus or aneurysm. Electronically Signed: Prosper Acuna MD  4/8/2025 3:55 PM EDT  Workstation ID: [id number]    MRI Brain Without Contrast  Result Date: 4/8/2025  Narrative: MRI BRAIN WO CONTRAST Date of Exam: 4/8/2025 2:44 PM EDT Indication: dizzy, coordination diff 
with hands.  Comparison: None available. Technique:  Routine multiplanar/multisequence sequence images of the brain were obtained without contrast administration. Findings: Acute infarct involving the left aspect of the last. Acute infarct in the right basal ganglia. Chronic lacunar infarcts in the bilateral basal ganglia. No acute midline shift, extra axial fluid collection, or hydrocephalus. No subacute to old hemorrhage. Mild generalized parenchymal volume loss. Scattered areas of T2/FLAIR hyperintensity in the subcortical and periventricular white matter, nonspecific, perhaps from small vessel ischemic/hypertensive changes in a patient of this age. Appropriate flow voids at the skull base and in the major venous sinuses. Mild mucosal changes in the paranasal sinuses. Mastoid air cells are essentially clear. Visualized globes and orbits appear unremarkable by MRI. No acute or aggressive appearing bone or extracranial soft tissue process.     Impression: Impression: Acute pontine and right basal ganglia infarcts. Critical Findings were verbally communicated with Dr. London By Terence Carlson MD on 4/8/2025 at 3:49 p.m. Electronically Signed: Terence Carlson  4/8/2025 3:48 PM EDT  Workstation ID: [id number]    CT Head Without Contrast  Result Date: 4/8/2025  Narrative: CT HEAD WO CONTRAST Date of Exam: 4/8/2025 1:20 PM EDT Indication: weakness. Comparison: None available. Technique: Axial CT images were obtained of the head without contrast administration.  Coronal reconstructions were performed.  Automated exposure control and iterative reconstruction methods were used. Findings: Areas of hypoattenuation are seen within both the right and left basal ganglia consistent with areas of lacunar infarct, appearing likely chronic on the left and potentially somewhat more recent, likely subacute on the right. There is no evidence of associated hemorrhage or mass effect. Gray-white differentiation is otherwise maintained. 
-FOBT negative  -no overt signs of bleeding   f/u hb closely
There is no evidence of hemorrhage, mass or mass effect otherwise. The ventricles are normal in size and configuration. The orbits are normal. The paranasal sinuses  appear clear.     Impression: Impression: Focal areas of hypoattenuation are present within the basal ganglia bilaterally, appearance consistent with a chronic lacunar infarct on the left and potentially somewhat more recent, likely subacute infarct on the right. There is no evidence of associated hemorrhage or mass effect. Findings relayed to Currie by Aman via the epic secure messaging system at 1:59 p.m. 4/8/2025 Electronically Signed: Chao Newton MD  4/8/2025 2:05 PM EDT  Workstation ID: [id number]    XR Chest 1 View  Result Date: 4/8/2025  Narrative: XR CHEST 1 VW Date of Exam: 4/8/2025 1:22 PM EDT Indication: Weak/Dizzy/AMS triage protocol Comparison: 2/22/2020 FINDINGS: No new consolidations or pleural effusions are observed. The cardiac silhouette and mediastinum are stable. No acute osseous abnormalities are identified.     Impression: There is no significant change when compared to the prior study. There is no evidence for acute cardiopulmonary process. Electronically Signed: Tad Little MD  4/8/2025 1:34 PM EDT  Workstation ID: [id number]      Allergies:  is allergic to dextromethorphan, elavil [amitriptyline hcl], and norvasc [amlodipine besylate].    Wilfred  {Desc; reviewed/not reviewed:[id number]}    Discharge Medications:     Discharge Medications        New Medications        Instructions Start Date   Aspirin EC Adult Low Dose 81 MG EC tablet  Generic drug: aspirin   81 mg, Oral, Daily   Start Date: April 10, 2025     clopidogrel 75 MG tablet  Commonly known as: PLAVIX   75 mg, Oral, Daily   Start Date: April 10, 2025     rosuvastatin 40 MG tablet  Commonly known as: CRESTOR   40 mg, Oral, Daily   Start Date: April 10, 2025            Changes to Medications        Instructions Start Date   metoprolol succinate  MG 24 hr 
found to be Covid + in the ED  currently normal O2 sat on RA  Pulm/ID following
tablet  Commonly known as: TOPROL-XL  What changed: See the new instructions.   TAKE 1 TABLET BY MOUTH ONCE DAILY AT NIGHT AT BEDTIME FOR HIGH BLOOD PRESSURE             Continue These Medications        Instructions Start Date   diazePAM 5 MG tablet  Commonly known as: VALIUM   5 mg, Oral, Nightly PRN      diphenhydrAMINE-acetaminophen  MG tablet per tablet  Commonly known as: TYLENOL PM   1 tablet, Nightly PRN      escitalopram 10 MG tablet  Commonly known as: LEXAPRO   15 mg, Nightly      hydrocortisone 2.5 % ointment   1 application , Topical, 2 Times Daily      magnesium gluconate 500 MG tablet  Commonly known as: MAGONATE   27 mg, 2 Times Daily      melatonin 5 MG tablet tablet   10 mg, Nightly      Foster 3 1000 MG capsule   1,000 mg, Daily      QC Tumeric Complex 500 MG capsule  Generic drug: Turmeric   2 capsules, Daily With Breakfast      valsartan 320 MG tablet  Commonly known as: DIOVAN   320 mg, Oral, Daily      Vitamin D (Cholecalciferol) 25 MCG (1000 UT) capsule   5,000 Units, Daily             Stop These Medications      hydroCHLOROthiazide 25 MG tablet     ibuprofen 800 MG tablet  Commonly known as: ADVIL,MOTRIN              Last Lab Results:   Lab Results (most recent)       Procedure Component Value Units Date/Time    Vitamin B12 [[id number]]  (Normal) Collected: 04/09/25 0956    Specimen: Blood Updated: 04/09/25 1646     Vitamin B-12 535 pg/mL     Narrative:      Results may be falsely increased if patient taking Biotin.      Folate [[id number]]  (Normal) Collected: 04/09/25 0956    Specimen: Blood Updated: 04/09/25 1646     Folate >20.00 ng/mL     Narrative:      Results may be falsely increased if patient taking Biotin.      Lipid Panel [[id number]]  (Abnormal) Collected: 04/09/25 0956    Specimen: Blood Updated: 04/09/25 1123     Total Cholesterol 399 mg/dL      Triglycerides 181 mg/dL      HDL Cholesterol 55 mg/dL      LDL Cholesterol  307 mg/dL      VLDL Cholesterol 37 mg/dL      LDL/HDL 
-FOBT negative  -no overt signs of bleeding   -obtain Iron, Ferritin, TIBC  -trend CBC
Ratio 5.60    Narrative:      Cholesterol Reference Ranges  (U.S. Department of Health and Human Services ATP III Classifications)    Desirable          <200 mg/dL  Borderline High    200-239 mg/dL  High Risk          >240 mg/dL      Triglyceride Reference Ranges  (U.S. Department of Health and Human Services ATP III Classifications)    Normal           <150 mg/dL  Borderline High  150-199 mg/dL  High             200-499 mg/dL  Very High        >500 mg/dL    HDL Reference Ranges  (U.S. Department of Health and Human Services ATP III Classifications)    Low     <40 mg/dl (major risk factor for CHD)  High    >60 mg/dl ('negative' risk factor for CHD)        LDL Reference Ranges  (U.S. Department of Health and Human Services ATP III Classifications)    Optimal          <100 mg/dL  Near Optimal     100-129 mg/dL  Borderline High  130-159 mg/dL  High             160-189 mg/dL  Very High        >189 mg/dL    LDL is calculated using the NIH LDL-C calculation.      Methylmalonic Acid, Serum [[id number]] Collected: 04/09/25 0956    Specimen: Blood Updated: 04/09/25 1108    POC Glucose Once [[id number]]  (Normal) Collected: 04/09/25 0621    Specimen: Blood Updated: 04/09/25 0627     Glucose 120 mg/dL     POC Glucose Once [[id number]]  (Normal) Collected: 04/08/25 2353    Specimen: Blood Updated: 04/08/25 2359     Glucose 92 mg/dL     Hemoglobin A1c [[id number]]  (Abnormal) Collected: 04/08/25 1307    Specimen: Blood Updated: 04/08/25 1712     Hemoglobin A1C 6.05 %     Narrative:      Hemoglobin A1C Ranges:    Increased Risk for Diabetes  5.7% to 6.4%  Diabetes                     >= 6.5%  Diabetic Goal                < 7.0%    Lipid Panel [[id number]]  (Abnormal) Collected: 04/08/25 1307    Specimen: Blood Updated: 04/08/25 1532     Total Cholesterol 421 mg/dL      Triglycerides 210 mg/dL      HDL Cholesterol 57 mg/dL      LDL Cholesterol  318 mg/dL      VLDL Cholesterol 46 mg/dL      LDL/HDL Ratio 5.65    Narrative:      
hx stage IV gastric adenocarcinoma w/ mets to peritoneum   -Management per heme/onc.
Cholesterol Reference Ranges  (U.S. Department of Health and Human Services ATP III Classifications)    Desirable          <200 mg/dL  Borderline High    200-239 mg/dL  High Risk          >240 mg/dL      Triglyceride Reference Ranges  (U.S. Department of Health and Human Services ATP III Classifications)    Normal           <150 mg/dL  Borderline High  150-199 mg/dL  High             200-499 mg/dL  Very High        >500 mg/dL    HDL Reference Ranges  (U.S. Department of Health and Human Services ATP III Classifications)    Low     <40 mg/dl (major risk factor for CHD)  High    >60 mg/dl ('negative' risk factor for CHD)        LDL Reference Ranges  (U.S. Department of Health and Human Services ATP III Classifications)    Optimal          <100 mg/dL  Near Optimal     100-129 mg/dL  Borderline High  130-159 mg/dL  High             160-189 mg/dL  Very High        >189 mg/dL    LDL is calculated using the NIH LDL-C calculation.      Urinalysis, Microscopic Only - Urine, Clean Catch [[id number]]  (Abnormal) Collected: 04/08/25 1428    Specimen: Urine, Clean Catch Updated: 04/08/25 1526     RBC, UA 3-5 /HPF      WBC, UA None Seen /HPF      Bacteria, UA Trace /HPF      Squamous Epithelial Cells, UA 13-20 /HPF      Hyaline Casts, UA 0-2 /LPF      Mucus, UA Small/1+ /HPF      Methodology Manual Light Microscopy    High Sensitivity Troponin T 1Hr [[id number]]  (Normal) Collected: 04/08/25 1417    Specimen: Blood Updated: 04/08/25 1509     HS Troponin T 7 ng/L      Troponin T Numeric Delta -1 ng/L     Narrative:      High Sensitive Troponin T Reference Range:  <14.0 ng/L- Negative Female for AMI  <22.0 ng/L- Negative Male for AMI  >=14 - Abnormal Female indicating possible myocardial injury.  >=22 - Abnormal Male indicating possible myocardial injury.   Clinicians would have to utilize clinical acumen, EKG, Troponin, and serial changes to determine if it is an Acute Myocardial Infarction or myocardial injury due to an underlying 
-FOBT negative  -no overt signs of bleeding   f/u hb closely
chronic condition.         Urinalysis With Microscopic If Indicated (No Culture) - Urine, Clean Catch [[id number]]  (Abnormal) Collected: 04/08/25 1428    Specimen: Urine, Clean Catch Updated: 04/08/25 1450     Color, UA Yellow     Appearance, UA Clear     pH, UA 6.0     Specific Gravity, UA 1.040     Comment: Result obtained by Refractometer        Glucose, UA Negative     Ketones, UA Negative     Bilirubin, UA Negative     Blood, UA Trace     Protein,  mg/dL (2+)     Leuk Esterase, UA Negative     Nitrite, UA Negative     Urobilinogen, UA 2.0 E.U./dL    Comprehensive Metabolic Panel [[id number]]  (Abnormal) Collected: 04/08/25 1307    Specimen: Blood Updated: 04/08/25 1345     Glucose 115 mg/dL      BUN 22 mg/dL      Creatinine 0.92 mg/dL      Sodium 143 mmol/L      Potassium 4.3 mmol/L      Comment: Slight hemolysis detected by analyzer. Result may be falsely elevated.        Chloride 105 mmol/L      CO2 24.4 mmol/L      Calcium 9.7 mg/dL      Total Protein 6.6 g/dL      Albumin 4.4 g/dL      ALT (SGPT) 24 U/L      AST (SGOT) 27 U/L      Comment: Slight hemolysis detected by analyzer. Result may be falsely elevated.        Alkaline Phosphatase 65 U/L      Total Bilirubin 0.3 mg/dL      Globulin 2.2 gm/dL      A/G Ratio 2.0 g/dL      BUN/Creatinine Ratio 23.9     Anion Gap 13.6 mmol/L      eGFR 66.7 mL/min/1.73     Narrative:      GFR Categories in Chronic Kidney Disease (CKD)      GFR Category          GFR (mL/min/1.73)    Interpretation  G1                     90 or greater         Normal or high (1)  G2                      60-89                Mild decrease (1)  G3a                   45-59                Mild to moderate decrease  G3b                   30-44                Moderate to severe decrease  G4                    15-29                Severe decrease  G5                    14 or less           Kidney failure          (1)In the absence of evidence of kidney disease, neither GFR category G1 or G2 
hx stage IV gastric adenocarcinoma w/ mets to peritoneum   -Management per heme/onc.
hx stage IV gastric adenocarcinoma w/ mets to peritoneum   -Management per heme/onc.
fulfill the criteria for CKD.    eGFR calculation 2021 CKD-EPI creatinine equation, which does not include race as a factor    Magnesium [[id number]]  (Normal) Collected: 04/08/25 1307    Specimen: Blood Updated: 04/08/25 1342     Magnesium 2.0 mg/dL     High Sensitivity Troponin T [[id number]]  (Normal) Collected: 04/08/25 1307    Specimen: Blood Updated: 04/08/25 1334     HS Troponin T 8 ng/L     Narrative:      High Sensitive Troponin T Reference Range:  <14.0 ng/L- Negative Female for AMI  <22.0 ng/L- Negative Male for AMI  >=14 - Abnormal Female indicating possible myocardial injury.  >=22 - Abnormal Male indicating possible myocardial injury.   Clinicians would have to utilize clinical acumen, EKG, Troponin, and serial changes to determine if it is an Acute Myocardial Infarction or myocardial injury due to an underlying chronic condition.         [place] Draw [[id number]] Collected: 04/08/25 1307    Specimen: Blood Updated: 04/08/25 1315    Narrative:      The following orders were created for panel order [place] Draw.  Procedure                               Abnormality         Status                     ---------                               -----------         ------                     Green Top (Gel)[[id number]]                                  Final result               Lavender Top[[id number]]                                     Final result               Gold Top - SST[[id number]]                                   Final result               Light Blue Top[[id number]]                                   Final result                 Please view results for these tests on the individual orders.    Green Top (Gel) [[id number]] Collected: 04/08/25 1307    Specimen: Blood Updated: 04/08/25 1315     Extra Tube Hold for add-ons.     Comment: Auto resulted.       Lavender Top [[id number]] Collected: 04/08/25 1307    Specimen: Blood Updated: 04/08/25 1315     Extra Tube hold for add-on     Comment: Auto resulted       
-FOBT negative  -no overt signs of bleeding   -obtain Iron, Ferritin, TIBC  -trend CBC
Gold Top - [facility] [[id number]] Collected: 04/08/25 1307    Specimen: Blood Updated: 04/08/25 1315     Extra Tube Hold for add-ons.     Comment: Auto resulted.       Light Blue Top [[id number]] Collected: 04/08/25 1307    Specimen: Blood Updated: 04/08/25 1315     Extra Tube Hold for add-ons.     Comment: Auto resulted       CBC & Differential [[id number]]  (Normal) Collected: 04/08/25 1307    Specimen: Blood Updated: 04/08/25 1313    Narrative:      The following orders were created for panel order CBC & Differential.  Procedure                               Abnormality         Status                     ---------                               -----------         ------                     CBC Auto Differential[[id number]]        Normal              Final result                 Please view results for these tests on the individual orders.    CBC Auto Differential [[id number]]  (Normal) Collected: 04/08/25 1307    Specimen: Blood Updated: 04/08/25 1313     WBC 7.33 10*3/mm3      RBC 4.90 10*6/mm3      Hemoglobin 14.6 g/dL      Hematocrit 43.6 %      MCV 89.0 fL      MCH 29.8 pg      MCHC 33.5 g/dL      RDW 13.4 %      RDW-SD 42.9 fl      MPV 11.4 fL      Platelets 186 10*3/mm3      Neutrophil % 62.1 %      Lymphocyte % 25.5 %      Monocyte % 9.4 %      Eosinophil % 2.0 %      Basophil % 0.7 %      Immature Grans % 0.3 %      Neutrophils, Absolute 4.55 10*3/mm3      Lymphocytes, Absolute 1.87 10*3/mm3      Monocytes, Absolute 0.69 10*3/mm3      Eosinophils, Absolute 0.15 10*3/mm3      Basophils, Absolute 0.05 10*3/mm3      Immature Grans, Absolute 0.02 10*3/mm3      nRBC 0.0 /100 WBC           Imaging Results (Most Recent)       Procedure Component Value Units Date/Time    MRI Angiogram Neck With & Without Contrast [[id number]] Collected: 04/08/25 1610     Updated: 04/08/25 1621    Narrative:      MRI ANGIOGRAM NECK W WO CONTRAST    Date of Exam: 4/8/2025 2:48 PM EDT    Indication: dizzy, coordination diff with hands.   
hx stage IV gastric adenocarcinoma w/ mets to peritoneum   -Management per heme/onc.
  Comparison: None available.    Technique:  Routine 3-D time-of-flight gradient echo imaging was obtained of the neck before and after the uneventful administration of Multihance.      Findings:  Aortic arch: Conventional, three-vessel, aortic arch.    Subclavian arteries: No flow-limiting stenosis. No occlusion.    Carotid arteries: No flow-limiting stenosis. No occlusion. Approximately 50% stenosis of the right internal carotid artery by NASCET criteria at its origin. Less than 50% narrowing of the left internal carotid artery by NASCET criteria at its origin.    Vertebral arteries: No flow-limiting stenosis. No occlusion.    Included intracranial arteries: No flow-limiting stenosis. No occlusion.      Impression:      Impression:   No large vessel occlusion or severe stenosis of the major arteries of the neck.    Approximately 50% stenosis of the right internal carotid artery at its origin.      Electronically Signed: Terence Carlson    4/8/2025 4:18 PM EDT    Workstation ID: [id number]    MRI Angiogram Head Without Contrast [[id number]] Collected: 04/08/25 1541     Updated: 04/08/25 1558    Narrative:      MRI ANGIOGRAM HEAD WO CONTRAST    Date of Exam: 4/8/2025 2:47 PM EDT    Indication: dizzy, coordination diff with hands.     Comparison: MRI brain from today    Technique:  Routine 3-D time-of-flight gradient echo imaging was obtained of the head without contrast administration.      Findings:  The bilateral intracranial internal carotid, bilateral middle cerebral, bilateral anterior cerebral, and anterior communicating arteries are widely patent. The bilateral intracranial vertebral, basilar, and bilateral posterior cerebral arteries are   widely patent. No definite posterior communicating artery is identified on either side. No intracranial aneurysm is identified. The visualized portions of the bilateral superior cerebellar, anterior inferior cerebellar, and posterior inferior cerebellar   arteries are 
-FOBT negative  -no overt signs of bleeding   f/u hb closely
hx stage IV gastric adenocarcinoma w/ mets to peritoneum   -Management per heme/onc.
unremarkable.      Impression:      Impression:  Major arterial vasculature within head appears widely patent, with no thrombus or aneurysm.        Electronically Signed: Prosper Acuna MD    4/8/2025 3:55 PM EDT    Workstation ID: [id number]    MRI Brain Without Contrast [[id number]] Collected: 04/08/25 1541     Updated: 04/08/25 1551    Narrative:      MRI BRAIN WO CONTRAST    Date of Exam: 4/8/2025 2:44 PM EDT    Indication: dizzy, coordination diff with hands.     Comparison: None available.    Technique:  Routine multiplanar/multisequence sequence images of the brain were obtained without contrast administration.      Findings:  Acute infarct involving the left aspect of the last. Acute infarct in the right basal ganglia.    Chronic lacunar infarcts in the bilateral basal ganglia.    No acute midline shift, extra axial fluid collection, or hydrocephalus.    No subacute to old hemorrhage.    Mild generalized parenchymal volume loss.    Scattered areas of T2/FLAIR hyperintensity in the subcortical and periventricular white matter, nonspecific, perhaps from small vessel ischemic/hypertensive changes in a patient of this age.    Appropriate flow voids at the skull base and in the major venous sinuses.    Mild mucosal changes in the paranasal sinuses. Mastoid air cells are essentially clear.    Visualized globes and orbits appear unremarkable by MRI.    No acute or aggressive appearing bone or extracranial soft tissue process.        Impression:      Impression:  Acute pontine and right basal ganglia infarcts.    Critical Findings were verbally communicated with Dr. London By Terence Carlson MD on 4/8/2025 at 3:49 p.m.    Electronically Signed: Terence Carlson    4/8/2025 3:48 PM EDT    Workstation ID: [id number]    CT Head Without Contrast [[id number]] Collected: 04/08/25 1348     Updated: 04/08/25 1408    Narrative:      CT HEAD WO CONTRAST    Date of Exam: 4/8/2025 1:20 PM EDT    Indication: weakness.    Comparison: 
None available.    Technique: Axial CT images were obtained of the head without contrast administration.  Coronal reconstructions were performed.  Automated exposure control and iterative reconstruction methods were used.      Findings:  Areas of hypoattenuation are seen within both the right and left basal ganglia consistent with areas of lacunar infarct, appearing likely chronic on the left and potentially somewhat more recent, likely subacute on the right. There is no evidence of   associated hemorrhage or mass effect. Gray-white differentiation is otherwise maintained. There is no evidence of hemorrhage, mass or mass effect otherwise. The ventricles are normal in size and configuration. The orbits are normal. The paranasal sinuses   appear clear.      Impression:      Impression:  Focal areas of hypoattenuation are present within the basal ganglia bilaterally, appearance consistent with a chronic lacunar infarct on the left and potentially somewhat more recent, likely subacute infarct on the right. There is no evidence of   associated hemorrhage or mass effect.    Findings relayed to [place] by [facility] via the epic secure messaging system at 1:59 p.m. 4/8/2025      Electronically Signed: Chao Newton MD    4/8/2025 2:05 PM EDT    Workstation ID: [id number]    XR Chest 1 View [[id number]] Collected: 04/08/25 1334     Updated: 04/08/25 1346    Narrative:      XR CHEST 1 VW    Date of Exam: 4/8/2025 1:22 PM EDT    Indication: Weak/Dizzy/AMS triage protocol    Comparison: 2/22/2020    FINDINGS:  No new consolidations or pleural effusions are observed. The cardiac silhouette and mediastinum are stable. No acute osseous abnormalities are identified.      Impression:      There is no significant change when compared to the prior study. There is no evidence for acute cardiopulmonary process.        Electronically Signed: Tad Little MD    4/8/2025 1:34 PM EDT    Workstation ID: [id number]      
      PROCEDURES      Condition on Discharge:  ***    Physical Exam at Discharge  Vital Signs  Temp:  [97.2 °F (36.2 °C)-98 °F (36.7 °C)] 97.5 °F (36.4 °C)  Heart Rate:  [49-57] 57  Resp:  [16-18] 18  BP: (136-178)/(65-79) 136/65    Physical Exam:  Physical Exam   Constitutional: Patient appears well-developed and well-nourished and in no acute distress   HEENT:   Head: Normocephalic and atraumatic.   Eyes:  Pupils are equal, round, and reactive to light. EOM are intact. Sclera are anicteric and non-injected.  Mouth and Throat: Patient has moist mucous membranes. Oropharynx is clear of any erythema or exudate.     Neck: Neck supple. No JVD present. No thyromegaly present. No lymphadenopathy present.  Cardiovascular: Regular rate, regular rhythm, S1 normal and S2 normal.  Exam reveals no gallop and no friction rub.  No murmur heard.  Pulmonary/Chest: Lungs are clear to auscultation bilaterally. No respiratory distress. No wheezes. No rhonchi. No rales.   Abdominal: Soft. Bowel sounds are normal. No distension and no mass. There is no hepatosplenomegaly. There is no tenderness.   Musculoskeletal: Normal Muscle tone  Extremities: No edema. Pulses are palpable in all 4 extremities.  Neurological: Patient is alert and oriented to person, place, and time. Cranial nerves II-XII are grossly intact with no focal deficits.  Skin: Skin is warm. No rash noted. Nails show no clubbing.  No cyanosis or erythema.    Discharge Disposition  ***    Visiting Nurse:    {YES/NO:200010}    Home PT/OT:  {YES/NO:200010}    Home Safety Evaluation:  {YES/NO:200010}    DME  ***    Discharge Diet:      Dietary Orders (From admission, onward)       Start     Ordered    04/09/25 1621  Diet: Regular/House, Diabetic; Consistent Carbohydrate; Fluid Consistency: Thin (IDDSI 0)  Diet Effective Now        References:    Diet Order Definitions   Question Answer Comment   Diets: Regular/House    Diets: Diabetic    Diabetic Diet: Consistent Carbohydrate  
  Fluid Consistency: Thin (IDDSI 0)        04/09/25 1621                    Activity at Discharge:  ***    Pre-discharge education  {Discharge Education:31459}      Follow-up Appointments  Future Appointments   Date Time Provider Department Center   7/8/2025  2:30 PM Sona Malcolm APRN [facility]     Additional Instructions for the Follow-ups that You Need to Schedule       Ambulatory Referral to Sleep Medicine   As directed      Follow-up needed: Yes        Discharge Follow-up with PCP   As directed       Currently Documented PCP:    Cornelio Rosa APRN    PCP Phone Number:    [phone]     Follow Up Details: 1 week for BP check        Discharge Follow-up with Specified Provider: FREEDOM Redd as scheduled.   As directed      To: FREEDOM Redd as scheduled.                Test Results Pending at Discharge  Pending Labs       Order Current Status    Methylmalonic Acid, Serum In process             Sumanth Milian MD  04/09/25  18:01 EDT    Time: {Time spent:380241498} (if over 30 minutes give explanation as to why it took greater than 30 minutes)               
-FOBT negative  -no overt signs of bleeding   f/u hb closely